# Patient Record
Sex: FEMALE | Race: WHITE | NOT HISPANIC OR LATINO | Employment: OTHER | URBAN - NONMETROPOLITAN AREA
[De-identification: names, ages, dates, MRNs, and addresses within clinical notes are randomized per-mention and may not be internally consistent; named-entity substitution may affect disease eponyms.]

---

## 2017-04-26 ENCOUNTER — OFFICE VISIT - GICH (OUTPATIENT)
Dept: FAMILY MEDICINE | Facility: OTHER | Age: 67
End: 2017-04-26

## 2017-04-26 ENCOUNTER — HISTORY (OUTPATIENT)
Dept: FAMILY MEDICINE | Facility: OTHER | Age: 67
End: 2017-04-26

## 2017-04-26 DIAGNOSIS — Z12.4 ENCOUNTER FOR SCREENING FOR MALIGNANT NEOPLASM OF CERVIX: ICD-10-CM

## 2017-04-27 ENCOUNTER — OFFICE VISIT - GICH (OUTPATIENT)
Dept: PEDIATRICS | Facility: OTHER | Age: 67
End: 2017-04-27

## 2017-04-27 ENCOUNTER — HISTORY (OUTPATIENT)
Dept: PEDIATRICS | Facility: OTHER | Age: 67
End: 2017-04-27

## 2017-04-27 DIAGNOSIS — F43.9 REACTION TO SEVERE STRESS: ICD-10-CM

## 2017-04-27 DIAGNOSIS — E04.2 NONTOXIC MULTINODULAR GOITER: ICD-10-CM

## 2017-04-27 DIAGNOSIS — Z13.220 ENCOUNTER FOR SCREENING FOR LIPOID DISORDERS: ICD-10-CM

## 2017-04-27 DIAGNOSIS — R19.6 HALITOSIS: ICD-10-CM

## 2017-04-27 DIAGNOSIS — Z12.11 ENCOUNTER FOR SCREENING FOR MALIGNANT NEOPLASM OF COLON: ICD-10-CM

## 2017-04-27 DIAGNOSIS — K58.9 IRRITABLE BOWEL SYNDROME WITHOUT DIARRHEA: ICD-10-CM

## 2017-04-27 DIAGNOSIS — L65.9 NONSCARRING HAIR LOSS: ICD-10-CM

## 2017-04-27 DIAGNOSIS — J30.2 OTHER SEASONAL ALLERGIC RHINITIS: ICD-10-CM

## 2017-04-27 LAB
ABSOLUTE BASOPHILS - HISTORICAL: 0 THOU/CU MM
ABSOLUTE EOSINOPHILS - HISTORICAL: 0.1 THOU/CU MM
ABSOLUTE LYMPHOCYTES - HISTORICAL: 2 THOU/CU MM (ref 0.9–2.9)
ABSOLUTE MONOCYTES - HISTORICAL: 0.3 THOU/CU MM
ABSOLUTE NEUTROPHILS - HISTORICAL: 3.8 THOU/CU MM (ref 1.7–7)
ANION GAP - HISTORICAL: 7 (ref 5–18)
BASOPHILS # BLD AUTO: 0.6 %
BUN SERPL-MCNC: 18 MG/DL (ref 7–25)
BUN/CREAT RATIO - HISTORICAL: 30
CALCIUM SERPL-MCNC: 9.4 MG/DL (ref 8.6–10.3)
CHLORIDE SERPLBLD-SCNC: 104 MMOL/L (ref 98–107)
CHOL/HDL RATIO - HISTORICAL: 3.42
CHOLESTEROL TOTAL: 212 MG/DL
CO2 SERPL-SCNC: 27 MMOL/L (ref 21–31)
CREAT SERPL-MCNC: 0.6 MG/DL (ref 0.7–1.3)
EOSINOPHIL NFR BLD AUTO: 1.3 %
ERYTHROCYTE [DISTWIDTH] IN BLOOD BY AUTOMATED COUNT: 12 % (ref 11.5–15.5)
GFR IF NOT AFRICAN AMERICAN - HISTORICAL: >60 ML/MIN/1.73M2
GLUCOSE SERPL-MCNC: 80 MG/DL (ref 70–105)
HCT VFR BLD AUTO: 41 % (ref 33–51)
HDLC SERPL-MCNC: 62 MG/DL (ref 23–92)
HEMOGLOBIN: 13.2 G/DL (ref 12–16)
LDLC SERPL CALC-MCNC: 134 MG/DL
LYMPHOCYTES NFR BLD AUTO: 32.1 % (ref 20–44)
MCH RBC QN AUTO: 30.9 PG (ref 26–34)
MCHC RBC AUTO-ENTMCNC: 32.2 G/DL (ref 32–36)
MCV RBC AUTO: 96 FL (ref 80–100)
MONOCYTES NFR BLD AUTO: 5.6 %
NEUTROPHILS NFR BLD AUTO: 60.4 % (ref 42–72)
NON-HDL CHOLESTEROL - HISTORICAL: 150 MG/DL
PATIENT STATUS - HISTORICAL: ABNORMAL
PLATELET # BLD AUTO: 237 THOU/CU MM (ref 140–440)
PMV BLD: 6.5 FL (ref 6.5–11)
POTASSIUM SERPL-SCNC: 3.9 MMOL/L (ref 3.5–5.1)
RED BLOOD COUNT - HISTORICAL: 4.27 MIL/CU MM (ref 4–5.2)
SODIUM SERPL-SCNC: 138 MMOL/L (ref 133–143)
TRIGL SERPL-MCNC: 81 MG/DL
TSH - HISTORICAL: 0.53 UIU/ML (ref 0.34–5.6)
WHITE BLOOD COUNT - HISTORICAL: 6.3 THOU/CU MM (ref 4.5–11)

## 2017-04-27 ASSESSMENT — ANXIETY QUESTIONNAIRES
GAD7 TOTAL SCORE: 0
3. WORRYING TOO MUCH ABOUT DIFFERENT THINGS: NOT AT ALL
5. BEING SO RESTLESS THAT IT IS HARD TO SIT STILL: NOT AT ALL
2. NOT BEING ABLE TO STOP OR CONTROL WORRYING: NOT AT ALL
6. BECOMING EASILY ANNOYED OR IRRITABLE: NOT AT ALL
4. TROUBLE RELAXING: NOT AT ALL
7. FEELING AFRAID AS IF SOMETHING AWFUL MIGHT HAPPEN: NOT AT ALL
1. FEELING NERVOUS, ANXIOUS, OR ON EDGE: NOT AT ALL

## 2017-04-28 ENCOUNTER — COMMUNICATION - GICH (OUTPATIENT)
Dept: SURGERY | Facility: OTHER | Age: 67
End: 2017-04-28

## 2017-04-28 DIAGNOSIS — Z12.11 ENCOUNTER FOR SCREENING FOR MALIGNANT NEOPLASM OF COLON: ICD-10-CM

## 2017-05-02 LAB — HPV RESULTS - HISTORICAL: NEGATIVE

## 2017-05-03 ENCOUNTER — AMBULATORY - GICH (OUTPATIENT)
Dept: SCHEDULING | Facility: OTHER | Age: 67
End: 2017-05-03

## 2017-05-07 ENCOUNTER — COMMUNICATION - GICH (OUTPATIENT)
Dept: FAMILY MEDICINE | Facility: OTHER | Age: 67
End: 2017-05-07

## 2017-05-15 ENCOUNTER — HISTORY (OUTPATIENT)
Dept: SURGERY | Facility: OTHER | Age: 67
End: 2017-05-15

## 2017-05-17 ENCOUNTER — SURGERY (OUTPATIENT)
Dept: SURGERY | Facility: OTHER | Age: 67
End: 2017-05-17

## 2017-05-17 ENCOUNTER — TRANSFERRED RECORDS (OUTPATIENT)
Dept: MULTI SPECIALTY CLINIC | Facility: CLINIC | Age: 67
End: 2017-05-17

## 2017-05-17 ENCOUNTER — HISTORY (OUTPATIENT)
Dept: SURGERY | Facility: OTHER | Age: 67
End: 2017-05-17

## 2017-05-17 ENCOUNTER — HOSPITAL ENCOUNTER (OUTPATIENT)
Dept: SURGERY | Facility: OTHER | Age: 67
Discharge: HOME | End: 2017-05-17
Attending: SURGERY | Admitting: SURGERY

## 2017-05-18 ENCOUNTER — HISTORY (OUTPATIENT)
Dept: SURGERY | Facility: OTHER | Age: 67
End: 2017-05-18

## 2017-05-18 ENCOUNTER — COMMUNICATION - GICH (OUTPATIENT)
Dept: SURGERY | Facility: OTHER | Age: 67
End: 2017-05-18

## 2017-07-18 ENCOUNTER — OFFICE VISIT - GICH (OUTPATIENT)
Dept: FAMILY MEDICINE | Facility: OTHER | Age: 67
End: 2017-07-18

## 2017-07-18 ENCOUNTER — HISTORY (OUTPATIENT)
Dept: FAMILY MEDICINE | Facility: OTHER | Age: 67
End: 2017-07-18
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
## 2017-08-15 ENCOUNTER — HOSPITAL ENCOUNTER (OUTPATIENT)
Dept: RADIOLOGY | Facility: OTHER | Age: 67
End: 2017-08-15
Attending: INTERNAL MEDICINE

## 2017-08-15 ENCOUNTER — HISTORY (OUTPATIENT)
Dept: RADIOLOGY | Facility: OTHER | Age: 67
End: 2017-08-15

## 2017-08-15 DIAGNOSIS — Z12.31 ENCOUNTER FOR SCREENING MAMMOGRAM FOR MALIGNANT NEOPLASM OF BREAST: ICD-10-CM

## 2017-12-05 ENCOUNTER — OFFICE VISIT - GICH (OUTPATIENT)
Dept: OTOLARYNGOLOGY | Facility: OTHER | Age: 67
End: 2017-12-05

## 2017-12-05 ENCOUNTER — AMBULATORY - GICH (OUTPATIENT)
Dept: SCHEDULING | Facility: OTHER | Age: 67
End: 2017-12-05

## 2017-12-05 DIAGNOSIS — Z00.00 ENCOUNTER FOR GENERAL ADULT MEDICAL EXAMINATION WITHOUT ABNORMAL FINDINGS: ICD-10-CM

## 2017-12-27 NOTE — PROGRESS NOTES
Patient Information     Patient Name MRN Sex Ria Hudson [id number] Female 1950      Progress Notes by Mary Carmen Alvarado at 8/15/2017 10:13 AM     Author:  Mary Carmen Alvarado Service:  (none) Author Type:  (none)     Filed:  8/15/2017 10:14 AM Date of Service:  8/15/2017 10:13 AM Status:  Signed     :  Mary Carmen Alvarado            Falls Risk Criteria:    Age 65 and older or under age 4        Sensory deficits    Poor vision    Use of ambulatory aides    Impaired judgment    Unable to walk independently    Meets High Risk criteria for falls:  Yes               1.  Do you have dizziness or vertigo?    no                    2.  Do you need help standing or walking?   no                 3.  Have you fallen within the last 6 months?    no           4.  Has the patient been fasting?      no       If any risks are marked Yes, the following interventions are utilized:    Do not leave patient unattended     Assist patient in the dressing room and bathroom    Have ambulatory aides available throughout procedure    Involve patient s family if available

## 2017-12-28 NOTE — PROGRESS NOTES
"Patient Information     Patient Name MRN Sex Ria Hudson [id number] Female 1950      Progress Notes by Kassandra Ulloa NP at 2017  2:15 PM     Author:  Kassandra Ulloa NP Service:  (none) Author Type:  PHYS- Nurse Practitioner     Filed:  2017  4:24 PM Encounter Date:  2017 Status:  Signed     :  Kassandra Ulloa NP (PHYS- Nurse Practitioner)            HPI:    Ria Warren is a 66 y.o. female who presents to clinic today for tick bite concerns. She reports she removed a deer tick on . This was removed in pieces. Unsure if she got the entire tick out, has a lump now.     Past Medical History:     Diagnosis  Date     Abdominal pain, lower 10/10/2006    mid to lower quadrant. Under investigation      Atypical chest pain      Cat bite     Hospitalized      Generalized anxiety disorder      Goiter      Low back pain 10/10/2006    Right. Under investigation      NVD (normal vaginal delivery)      2, Para 1with 1 spontaneous       Postmenopausal      Special screening for malignant neoplasms, colon      Past Surgical History:      Procedure  Laterality Date     PAST SURGICAL HISTORY       Hospitalized for cat bite~ Lymph node resection x 3 at left axilla for benign disease~Notes prior complications from anesthesia:       [facility] COLONOSCOPY REMOVE FARHAD POLYP LESN HOT BPSY FORCEP  2012            [facility] COLONOSCOPY REMOVE FARHAD POLYP LESN HOT BPSY FORCEP  2017    follow up  tubular adenoma       TONSILLECTOMY       Social History     Substance Use Topics       Smoking status: Never Smoker     Smokeless tobacco: Never Used     Alcohol use No     No current outpatient prescriptions on file.     No current facility-administered medications for this visit.      Medications have been reviewed by me and are current to the best of my knowledge and ability.    Allergies      Allergen   Reactions     Penicillins  *Unknown     Any \"cillins\" Skin burn " from inside out /told to not use        Zithromax [Azithromycin]  Vomiting       ROS:  Pertinent positives and negatives are noted in HPI.    EXAM:  General appearance: well appearing female, in no acute distress  Dermatological: small red lesion to back of scalp, no foreign bodies noted, no erythema or rash  Psychological: normal affect, alert and pleasant    ASSESSMENT/PLAN:    ICD-10-CM    1. Tick bite, initial encounter W57.XXXA doxycycline (VIBRAMYCIN) 100 mg capsule   Tick bite that meets criteria for prophylaxis. Rx for doxycycline x1. Discussed s/s that would warrant f/u. All questions were answered and she is in agreement with plan.     Patient Instructions      Index Hebrew Related topics   Tick Bite   ________________________________________________________________________  KEY POINTS    Ticks are small bugs that feed on the blood of animals, birds, and people. If you find a tick attached to your skin, you have been bitten. You usually will not feel anything when a tick bites you, but you may have a little redness around the bite.    You need to remove the tick yourself or get help from your healthcare provider. Save the tick in case you later start having symptoms of disease and need to know what kind of tick bit you. Check for a rash and other symptoms for about 4 weeks after the bite.    When you are outdoors, wear long-sleeved shirts tucked into your pants. Wear your pants tucked into your socks or boot tops if possible. Use approved tick repellents on exposed skin and clothing.  ________________________________________________________________________  What are ticks?  Ticks are small bugs that feed on the blood of animals, birds, and people. There are many different kinds of ticks. Black-legged ticks, or deer ticks, are usually no bigger than the head of a pin. Wood and dog ticks are usually much larger. They may be about a quarter of an inch before feeding and half an inch when they are full of  blood.  Ticks are found in woodlands, grasslands, and marshlands and at the seashore. Wild birds and animals, as well as domestic animals and pets such as dogs, horses, and cows, can carry ticks. Ticks may climb on humans from animals, leaves, or low-lying brush. They may fall from tree leaves, especially on a windy day. Ticks cannot jump or fly.  How do I know if I have been bitten by a tick?  If you find a tick attached to your skin, you have been bitten. You usually will not feel anything when a tick bites you, but you may have a little redness around the bite.  Can I get sick from a tick bite?  There is little risk from the bite of a tick most of the time. However, some ticks carry infections that can be passed to people. For example:    Deer tick bites may cause Lyme disease. The symptoms of Lyme disease include a red, round rash that starts at the site of the bite and gets bigger. Over time, a similar rash may appear in other parts of the body. In some cases, the rash looks like a bulls-eye or target on your skin. Some people have a rash without other symptoms. If you do have other symptoms, they may include feeling very tired, headache, muscle aches, joint pain or swelling, and a fever.    Wood tick or dog tick bites may cause [place] Mountain spotted fever (RMSF). RMSF may first cause fever, headache, muscle aches, joint pain or swelling, and then a pink or red spotted rash. You need to get treatment right away if you have these symptoms and have had a tick bite. RMSF is seen in most states, not just the [place] areas.  Tick bites may cause other diseases as well.  How are tick bites treated?  If you find a tick attached to your body, you need to remove it. You can remove it yourself or get help from your healthcare provider. To remove an attached tick:    Grasp the tick with tweezers or fingers (covered with gloves or a tissue) as close to the skin as possible.    Gently pull the tick straight away  from you until it releases its hold. Use a slow gentle pulling motion. Pulling the tick out too quickly may tear the body from the mouth, leaving the mouth still in the skin. If you are unable to remove the tick completely, you may need to see your healthcare provider.    Do not twist the tick as you pull, and try not to squeeze its body. Squeezing or crushing the tick could force infected fluids from the tick into the site of the bite.  After you have removed the tick, thoroughly wash your hands and the bite area with soap and water. Put an antiseptic such as rubbing alcohol on the area where you were bitten.  Infected ticks usually do not spread an infection until after the tick has been attached and feeding on your blood for several hours. Check for a rash and other symptoms for about 4 weeks after the bite.  Save the tick in case you later start having symptoms of disease and need to know what kind of tick bit you. Put the tick in a sealed plastic bag and keep it in the freezer. Identification of the tick may help your provider diagnose and treat your symptoms. If you do not have any symptoms of disease after 1 month, you can throw the tick away.  How can I help prevent tick bites?    In areas of thick underbrush, try to stay near the center of trails.    When you are outdoors, wear long-sleeved shirts tucked into your pants. Wear your pants tucked into your socks or boot tops if possible. A hat may help, too. Wearing light-colored clothing may make it easier to spot a small tick before it reaches your skin and bites. While you are outside, check for ticks every 4 hours and remove any ticks on clothing or exposed skin.    Use approved tick repellents on exposed skin and clothing. Don't use more repellent than recommended in the package directions. Don't put repellent on open wounds or rashes. When using sprays, don t spray the repellent directly on your face. Spray the repellent on your hands first and then put  it on your face, but not near your eyes or mouth. Then wash the spray off your hands.    Adults should use products with no more than 35% DEET. Children older than 2 months can use repellents with no more than 30% DEET. DEET should be applied just once a day. Wash it off your body when you go back indoors. Some products contain more than 35% DEET. The higher concentrations are no more effective than the lower concentrations, but they may last longer. Read the label carefully before applying.    Picaridin may irritate the skin less than DEET and appears to be just as effective.    Spray clothes with repellents because insects may crawl from clothing to the skin or bite through thin clothing. Products containing permethrin are recommended for use on clothing, shoes, bed nets, and camping gear. Permethrin-treated clothing repels and kills ticks, mosquitoes, and other insects and can keep working after laundering. Permethrin should be reapplied to clothing according to the instructions on the product label. You can buy clothing and hats pretreated with permethrin. Permethrin does not work as a repellent when it is put on the skin.    Treat household pets for ticks and fleas. Check pets after they have been outdoors.    Brush off clothing and pets before entering the house.    After you have been outdoors, undress and check your body for ticks. They usually crawl around for several hours before biting. Check your clothes, too. Wash them right away to remove any ticks.    Shower and shampoo after your outing.    Inspect any gear you have carried outdoors.    If you spend much time hiking, you may want to include a pair of tick tweezers in your first-aid kit. You can buy them at sporting goods stores.  Developed by [employer].  Adult Advisor 2016.3 published by [employer].  Last modified: 2016-04-04  Last reviewed: 2016-03-31  This content is reviewed periodically and is subject to change as new health information  becomes available. The information is intended to inform and educate and is not a replacement for medical evaluation, advice, diagnosis or treatment by a healthcare professional.  References   Adult Advisor 2016.3 Index    Copyright   2016 [employer], a division of [employer] All rights reserved.

## 2017-12-29 NOTE — PATIENT INSTRUCTIONS
Patient Information     Patient Name Ria Price [id number] Female 1950      Patient Instructions by Kassandra Ulloa NP at 2017  2:04 PM     Author:  Kassandra Ulloa NP  Service:  (none) Author Type:  PHYS- Nurse Practitioner     Filed:  2017  2:05 PM  Encounter Date:  2017 Status:  Addendum     :  Kassandra Ulloa NP (PHYS- Nurse Practitioner)        Related Notes: Original Note by Kassandra Ulloa NP (PHYS- Nurse Practitioner) filed at 2017  2:04 PM               Index [place] Islander Related topics   Tick Bite   ________________________________________________________________________  KEY POINTS    Ticks are small bugs that feed on the blood of animals, birds, and people. If you find a tick attached to your skin, you have been bitten. You usually will not feel anything when a tick bites you, but you may have a little redness around the bite.    You need to remove the tick yourself or get help from your healthcare provider. Save the tick in case you later start having symptoms of disease and need to know what kind of tick bit you. Check for a rash and other symptoms for about 4 weeks after the bite.    When you are outdoors, wear long-sleeved shirts tucked into your pants. Wear your pants tucked into your socks or boot tops if possible. Use approved tick repellents on exposed skin and clothing.  ________________________________________________________________________  What are ticks?  Ticks are small bugs that feed on the blood of animals, birds, and people. There are many different kinds of ticks. Black-legged ticks, or deer ticks, are usually no bigger than the head of a pin. Wood and dog ticks are usually much larger. They may be about a quarter of an inch before feeding and half an inch when they are full of blood.  Ticks are found in woodlands, grasslands, and marshlands and at the seashore. Wild birds and animals, as well as domestic animals and pets such as dogs,  horses, and cows, can carry ticks. Ticks may climb on humans from animals, leaves, or low-lying brush. They may fall from tree leaves, especially on a windy day. Ticks cannot jump or fly.  How do I know if I have been bitten by a tick?  If you find a tick attached to your skin, you have been bitten. You usually will not feel anything when a tick bites you, but you may have a little redness around the bite.  Can I get sick from a tick bite?  There is little risk from the bite of a tick most of the time. However, some ticks carry infections that can be passed to people. For example:    Deer tick bites may cause Lyme disease. The symptoms of Lyme disease include a red, round rash that starts at the site of the bite and gets bigger. Over time, a similar rash may appear in other parts of the body. In some cases, the rash looks like a bulls-eye or target on your skin. Some people have a rash without other symptoms. If you do have other symptoms, they may include feeling very tired, headache, muscle aches, joint pain or swelling, and a fever.    Wood tick or dog tick bites may cause Mohan Mountain spotted fever (RMSF). RMSF may first cause fever, headache, muscle aches, joint pain or swelling, and then a pink or red spotted rash. You need to get treatment right away if you have these symptoms and have had a tick bite. RMSF is seen in most states, not just the [place] areas.  Tick bites may cause other diseases as well.  How are tick bites treated?  If you find a tick attached to your body, you need to remove it. You can remove it yourself or get help from your healthcare provider. To remove an attached tick:    Grasp the tick with tweezers or fingers (covered with gloves or a tissue) as close to the skin as possible.    Gently pull the tick straight away from you until it releases its hold. Use a slow gentle pulling motion. Pulling the tick out too quickly may tear the body from the mouth, leaving the mouth still in  the skin. If you are unable to remove the tick completely, you may need to see your healthcare provider.    Do not twist the tick as you pull, and try not to squeeze its body. Squeezing or crushing the tick could force infected fluids from the tick into the site of the bite.  After you have removed the tick, thoroughly wash your hands and the bite area with soap and water. Put an antiseptic such as rubbing alcohol on the area where you were bitten.  Infected ticks usually do not spread an infection until after the tick has been attached and feeding on your blood for several hours. Check for a rash and other symptoms for about 4 weeks after the bite.  Save the tick in case you later start having symptoms of disease and need to know what kind of tick bit you. Put the tick in a sealed plastic bag and keep it in the freezer. Identification of the tick may help your provider diagnose and treat your symptoms. If you do not have any symptoms of disease after 1 month, you can throw the tick away.  How can I help prevent tick bites?    In areas of thick underbrush, try to stay near the center of trails.    When you are outdoors, wear long-sleeved shirts tucked into your pants. Wear your pants tucked into your socks or boot tops if possible. A hat may help, too. Wearing light-colored clothing may make it easier to spot a small tick before it reaches your skin and bites. While you are outside, check for ticks every 4 hours and remove any ticks on clothing or exposed skin.    Use approved tick repellents on exposed skin and clothing. Don't use more repellent than recommended in the package directions. Don't put repellent on open wounds or rashes. When using sprays, don t spray the repellent directly on your face. Spray the repellent on your hands first and then put it on your face, but not near your eyes or mouth. Then wash the spray off your hands.    Adults should use products with no more than 35% DEET. Children older than 2  months can use repellents with no more than 30% DEET. DEET should be applied just once a day. Wash it off your body when you go back indoors. Some products contain more than 35% DEET. The higher concentrations are no more effective than the lower concentrations, but they may last longer. Read the label carefully before applying.    Picaridin may irritate the skin less than DEET and appears to be just as effective.    Spray clothes with repellents because insects may crawl from clothing to the skin or bite through thin clothing. Products containing permethrin are recommended for use on clothing, shoes, bed nets, and camping gear. Permethrin-treated clothing repels and kills ticks, mosquitoes, and other insects and can keep working after laundering. Permethrin should be reapplied to clothing according to the instructions on the product label. You can buy clothing and hats pretreated with permethrin. Permethrin does not work as a repellent when it is put on the skin.    Treat household pets for ticks and fleas. Check pets after they have been outdoors.    Brush off clothing and pets before entering the house.    After you have been outdoors, undress and check your body for ticks. They usually crawl around for several hours before biting. Check your clothes, too. Wash them right away to remove any ticks.    Shower and shampoo after your outing.    Inspect any gear you have carried outdoors.    If you spend much time hiking, you may want to include a pair of tick tweezers in your first-aid kit. You can buy them at sporting goods stores.  Developed by [employer].  Adult Advisor 2016.3 published by [employer].  Last modified: 2016-04-04  Last reviewed: 2016-03-31  This content is reviewed periodically and is subject to change as new health information becomes available. The information is intended to inform and educate and is not a replacement for medical evaluation, advice, diagnosis or treatment by a healthcare  professional.  References   Adult Advisor 2016.3 Index    Copyright   2016 [employer], a division of [employer] All rights reserved.

## 2017-12-30 NOTE — NURSING NOTE
Patient Information     Patient Name MRN Sex Ria Hudson [id number] Female 1950      Nursing Note by Chelsi Romero at 2017  2:15 PM     Author:  Chelsi Romero Service:  (none) Author Type:  NURS- Student Practical Nurse     Filed:  2017  2:01 PM Encounter Date:  2017 Status:  Signed     :  Chelsi Romero (NURS- Student Practical Nurse)            Patient presents to the clinic with tick bite. Located to back of head. Pulled it out Nev[place] morning and now feels a lump, thinks some of the tick may be still in there.   Chelsi Romero ....................  2017   1:44 PM

## 2018-01-04 NOTE — PROGRESS NOTES
"Patient Information     Patient Name MRN Ria Art [id number] Female 1950      Progress Notes by Eduardo Nova MD at 2017 10:00 AM     Author:  Eduardo Nova MD Service:  (none) Author Type:  Physician     Filed:  2017 12:56 PM Encounter Date:  2017 Status:  Signed     :  Eduardo Nova MD (Physician)            Subjective  Ria Warren is a 66 y.o. female who presents for Evaluation of halitosis. She just noticed last few weeks. It's a horrible taste in her mouth. It's worse first manish in the morning.\"It tastes like something is dead.\" No nausea. No abdominal pain. No heartburn. She just came from the dentist with a good report. She's been using mouthwash, flossing and brushing her teeth and tongue. She's had a little sore throat which she describes as a roughness. She has lots of postnasal drainage. She feels like her sinuses are congested worse left greater than right. No sneezing. No rhinorrhea. Slight popping of the ears. No itching of the ears or eyes however her eyes feel irritated, gritty and look red.    Problem List/PMH: reviewed in EMR, and made relevant updates today.  Medications: reviewed in EMR, and made relevant updates today.  Allergies: reviewed in EMR, and made relevant updates today.    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      Granddaughter with Down's and congenital heart disease.    ,  [place]. Wants to renovate the family barn near [place]. Animal lover.      I reviewed social history and made relevant updates today.    Family Hx:   Family History       Problem   Relation Age of Onset     Cancer-breast  Mother      50's       Cancer-colon  Maternal Grandfather      Colon       Diabetes  Maternal Grandfather      Other  Other      Maternal side " heart disease       Blood Disease  Neg.      no blood clotting disorders       Thyroid Disease  Other      multiple women with goiters       Other  Neg.      no SLE, no RA, no sjogren's       Heart failure  Maternal Grandmother      Cancer-ovarian  No Family History        Objective  Vitals: reviewed in EMR.  /82  Pulse 64  Wt 69.2 kg (152 lb 8 oz)  BMI 29.78 kg/m2    Gen: Pleasant female, NAD.  HEENT: MMM, no OP erythema. Tympanic membranes are normal, small amount of clear fluid bilaterally. Nasal turbinates are pale, boggy.  Neck: Supple, no adenopathy  CV: RRR no m/r/g.   Pulm: CTAB no w/r/r  Neuro: Grossly intact  Msk: No lower extremity edema.  Skin: No concerning lesions.  Psychiatric: Normal affect and insight. Does not appear anxious or depressed.    Component      Latest Ref Rng & Units 3/4/2015   SODIUM      136 - 145 mmol/L 140   POTASSIUM      3.5 - 5.1 mmol/L 4.2   CHLORIDE      98 - 107 mmol/L 104   CO2,TOTAL      21 - 31 mmol/L 34 (H)   ANION GAP      5 - 18                 2 (L)   GLUCOSE      70 - 105 mg/dL 89   CALCIUM      8.6 - 10.3 mg/dL 9.3   BUN      7 - 25 mg/dL 17   CREATININE      0.70 - 1.30 mg/dL 0.58 (L)   BUN/CREAT RATIO                10 - 20                 29 (H)   GFR if African American      >60 ml/min/1.73m2 >60   GFR if not African American      >60 ml/min/1.73m2 >60   CHOLESTEROL,TOTAL      <200 mg/dL 200 (H)   TRIGLYCERIDES      <150 mg/dL 47   HDL CHOLESTEROL      23 - 92 mg/dL 60   NON-HDL CHOLESTEROL      <145 mg/dl 140   CHOL/HDL RATIO                 <4.50                 3.33   LDL CHOLESTEROL      <100 mg/dL 131 (H)   PATIENT STATUS                  NOT GIVEN   TSH      0.34 - 5.60 uIU/mL 0.46       Assessment    ICD-10-CM    1. Halitosis R19.6    2. Colon cancer screening Z12.11 COLONOSCOPY SCREENING-GICH   3. Multiple thyroid nodules E04.2 TSH      TSH   4. Seasonal allergic rhinitis, unspecified allergic rhinitis trigger J30.2 fluticasone (50 mcg per  actuation) nasal solution (FLONASE)   5. Irritable bowel syndrome, unspecified type K58.9    6. Hair loss L65.9 TSH      BASIC METABOLIC PANEL      CBC WITH DIFFERENTIAL      TSH      BASIC METABOLIC PANEL      CBC WITH DIFFERENTIAL      CBC WITH AUTO DIFFERENTIAL   7. Stress at home F43.9    8. Lipid screening Z13.220 LIPID PANEL      LIPID PANEL         Plan   -- Expected clinical course discussed   -- Medications and their side effects discussed  Patient Instructions    -- Shower/bathe before bed   -- Do sinus rinsing at least daily, but okay to use multiple times per day. (with distilled water)    Nasal saline spray    NeilMed sinus rinse    Neti pot   -- Start nasal steroid spray daily (eg Nasacort, Flonase)   -- When using steroid spray: tilt head forward, spray away from center septum, don't sniff deeply during inhalation.   -- Steroid spray works best when used consistently, not as needed.   -- Okay to start daily Claritin/Allegra/Zyrtec (without -D), can help for sneezing, itchy eyes, etc.   -- Okay to use diphenhydramine (Benadryl) as needed for sneezing, nasal congestion, can cause dry mouth, urinary retention, blurry vision, constipation     -- Try oral losenges with Xylitol   -- Consider restarting acid medicine, eg ranitidine 150 mg twice daily for heart burn   -- Follow-up if symptoms persist/worsen   -- Would refer to ENT if not improving           Index   Bad Breath   ________________________________________________________________________  KEY POINTS    Bad breath, also called halitosis, is an unpleasant smell from your mouth or nose. You may not always know when you have bad breath, but others may notice it.    See your dentist and dental hygienist as often as recommended for checkups and cleanings. They can check for gum disease or other dental problems.    If you need to use something constantly to freshen your breath, you should see your dentist or healthcare provider to help find the  cause.  ________________________________________________________________________  What is bad breath?  Bad breath, also called halitosis, is an unpleasant smell from your mouth or nose. You may not always know when you have bad breath, but others may notice it.   A quick way to check your breath is to lick the side of your finger and then let the saliva dry for a minute or so. Smell the spot and you'll know what your breath smells like. If you use dental floss, try smelling the floss when you are finished to see if you have bad breath.  What is the cause?   Bad breath is a symptom, not a disease. It may be caused by many things, such as:    Eating foods such as garlic or onions    Not brushing and flossing your teeth daily. Food stuck in your teeth and gums after eating is broken down by bacteria in your mouth and this can cause a bad smell.    Tooth decay and gum disease caused by plaque. Plaque is a sticky material that builds up on your teeth. It is made of mucus and saliva, food particles, acids, and bacteria. If it s not removed with daily brushing and flossing, plaque can lead to cavities, a hard buildup called tartar, and gum disease.    Smoking and use of other tobacco products    Dry mouth from medicines you are taking, salivary gland problems, or always breathing through your mouth. With less saliva, your body is not able to completely cleanse your mouth. Bad breath in the morning happens because your body uses up water at night and your mouth dries out.    Infection in your teeth, gums, sinuses, tonsils, lungs, or digestive system    Other medical problems, such as diabetes, liver or kidney disease, heartburn, or gastric reflux  How is it diagnosed?   Your dentist will examine your mouth, looking for tooth decay, pockets of plaque, and gum disease. If your dentist finds that your mouth is healthy, you may be referred to your healthcare provider to check for medical problems that can cause bad breath.  How  can I prevent bad breath?   Here are some things you can do to help prevent bad breath:    Clean your teeth well. Use a soft-bristled toothbrush and toothpaste that contains fluoride. Brush for at least 2 minutes, at least twice a day. Floss once a day. Cut a piece of floss that is long enough for you to be able to use a fresh section of floss each time you bring the floss down between two teeth. Don t forget to floss behind your back molars, where a lot of plaque can collect.    Brush your tongue, especially the back, to remove odor-causing bacteria.    When you cannot brush after eating, chew sugarless gum. It helps your body make saliva and helps remove plaque. Gum made with a sweetener called Xylitol can help limit the growth of bacteria.    Drink more water. If you have a dry mouth that does not go away with other treatments, such as drinking more water or chewing gum, your dentist may recommend a saliva replacement product.    Use a fluoride or alcohol-free antibacterial mouth rinse to help prevent tooth decay.    Avoid smoking, coffee, alcohol, onions, and garlic.    You can try drinking tea. Lab studies have shown that black or green tea keeps bacteria in the mouth from making the chemicals that smell bad.    If you wear dentures, take them out at night to clean them thoroughly. When possible, leave them out to soak while you sleep. Soak them in a denture cleaning solution and then brush them thoroughly to remove molds, fungus, and bacteria. Don't forget to brush all the areas in your mouth that are touched by the dentures.    See your dentist and dental hygienist as often as recommended for checkups and cleanings. They can check for gum disease or other dental problems.  You can use a mouthwash or other breath freshener to hide bad breath. But if you need to use something constantly to freshen your breath, you should see your dentist or healthcare provider to help find the cause.  You can get more  information from:    American Dental Association  193.890.8023   http://www.mouthhealthy.org/en/  Developed by [employer].  Adult Advisor 2016.3 published by [employer].  Last modified: 2016-05-25  Last reviewed: 2014-02-06  This content is reviewed periodically and is subject to change as new health information becomes available. The information is intended to inform and educate and is not a replacement for medical evaluation, advice, diagnosis or treatment by a healthcare professional.  References   Adult Advisor 2016.3 Index    Copyright   2016 [employer], a division of [employer] All rights reserved.         Return if symptoms worsen or fail to improve.    Signed, Eduardo Nova MD  Internal Medicine & Pediatrics

## 2018-01-04 NOTE — TELEPHONE ENCOUNTER
Patient Information     Patient Name MRN Ria Art [id number] Female 1950      Telephone Encounter by Denise Olivo at 2017  9:34 AM     Author:  Denise Olivo Service:  (none) Author Type:  (none)     Filed:  2017  9:43 AM Encounter Date:  2017 Status:  Signed     :  Denise Olivo            Screening Questions for the Scheduling of Screening Colonoscopies   (If Colonoscopy is diagnostic, Provider should review the chart before scheduling.)  Are you younger than 50 or older than 80?  NO  Do you take aspirin or fish oil?  NO (if yes, tell patient to stop 1 week prior to Colonoscopy)  Do you take warfarin (Coumadin), clopidogrel (Plavix), apixaban (Eliquis), dabigatram (Pradaxa), rivaroxaban (Xarelto) or any blood thinner? NO  Do you use oxygen at home?  NO  Do you have kidney disease? NO  Are you on dialysis? NO  Have you had a stroke or heart attack in the last year? NO  Have you had a stent in your heart or any blood vessel in the last year? NO  Have you had a transplant of any organ? NO  Have you had a colonoscopy or upper endoscopy (EGD) before? YES          When?  2013  - [facility]    Date of scheduled Colonoscopy 2017   Provider MCCLENDON   Mohinder PORRAS

## 2018-01-04 NOTE — PATIENT INSTRUCTIONS
Patient Information     Patient Name MRN Ria Art [id number] Female 1950      Patient Instructions by Eduardo Nova MD at 2017 10:00 AM     Author:  Eduardo Nova MD  Service:  (none) Author Type:  Physician     Filed:  2017 10:37 AM  Encounter Date:  2017 Status:  Addendum     :  Eduardo Nova MD (Physician)        Related Notes: Original Note by Eduardo Nova MD (Physician) filed at 2017 10:33 AM             -- Shower/bathe before bed   -- Do sinus rinsing at least daily, but okay to use multiple times per day. (with distilled water)    Nasal saline spray    NeilMed sinus rinse    Neti pot   -- Start nasal steroid spray daily (eg Nasacort, Flonase)   -- When using steroid spray: tilt head forward, spray away from center septum, don't sniff deeply during inhalation.   -- Steroid spray works best when used consistently, not as needed.   -- Okay to start daily Claritin/Allegra/Zyrtec (without -D), can help for sneezing, itchy eyes, etc.   -- Okay to use diphenhydramine (Benadryl) as needed for sneezing, nasal congestion, can cause dry mouth, urinary retention, blurry vision, constipation     -- Try oral losenges with Xylitol   -- Consider restarting acid medicine, eg ranitidine 150 mg twice daily for heart burn   -- Follow-up if symptoms persist/worsen   -- Would refer to ENT if not improving           Index   Bad Breath   ________________________________________________________________________  KEY POINTS    Bad breath, also called halitosis, is an unpleasant smell from your mouth or nose. You may not always know when you have bad breath, but others may notice it.    See your dentist and dental hygienist as often as recommended for checkups and cleanings. They can check for gum disease or other dental problems.    If you need to use something constantly to freshen your breath, you should see your dentist or healthcare provider to help find the  cause.  ________________________________________________________________________  What is bad breath?  Bad breath, also called halitosis, is an unpleasant smell from your mouth or nose. You may not always know when you have bad breath, but others may notice it.   A quick way to check your breath is to lick the side of your finger and then let the saliva dry for a minute or so. Smell the spot and you'll know what your breath smells like. If you use dental floss, try smelling the floss when you are finished to see if you have bad breath.  What is the cause?   Bad breath is a symptom, not a disease. It may be caused by many things, such as:    Eating foods such as garlic or onions    Not brushing and flossing your teeth daily. Food stuck in your teeth and gums after eating is broken down by bacteria in your mouth and this can cause a bad smell.    Tooth decay and gum disease caused by plaque. Plaque is a sticky material that builds up on your teeth. It is made of mucus and saliva, food particles, acids, and bacteria. If it s not removed with daily brushing and flossing, plaque can lead to cavities, a hard buildup called tartar, and gum disease.    Smoking and use of other tobacco products    Dry mouth from medicines you are taking, salivary gland problems, or always breathing through your mouth. With less saliva, your body is not able to completely cleanse your mouth. Bad breath in the morning happens because your body uses up water at night and your mouth dries out.    Infection in your teeth, gums, sinuses, tonsils, lungs, or digestive system    Other medical problems, such as diabetes, liver or kidney disease, heartburn, or gastric reflux  How is it diagnosed?   Your dentist will examine your mouth, looking for tooth decay, pockets of plaque, and gum disease. If your dentist finds that your mouth is healthy, you may be referred to your healthcare provider to check for medical problems that can cause bad breath.  How  can I prevent bad breath?   Here are some things you can do to help prevent bad breath:    Clean your teeth well. Use a soft-bristled toothbrush and toothpaste that contains fluoride. Brush for at least 2 minutes, at least twice a day. Floss once a day. Cut a piece of floss that is long enough for you to be able to use a fresh section of floss each time you bring the floss down between two teeth. Don t forget to floss behind your back molars, where a lot of plaque can collect.    Brush your tongue, especially the back, to remove odor-causing bacteria.    When you cannot brush after eating, chew sugarless gum. It helps your body make saliva and helps remove plaque. Gum made with a sweetener called Xylitol can help limit the growth of bacteria.    Drink more water. If you have a dry mouth that does not go away with other treatments, such as drinking more water or chewing gum, your dentist may recommend a saliva replacement product.    Use a fluoride or alcohol-free antibacterial mouth rinse to help prevent tooth decay.    Avoid smoking, coffee, alcohol, onions, and garlic.    You can try drinking tea. Lab studies have shown that black or green tea keeps bacteria in the mouth from making the chemicals that smell bad.    If you wear dentures, take them out at night to clean them thoroughly. When possible, leave them out to soak while you sleep. Soak them in a denture cleaning solution and then brush them thoroughly to remove molds, fungus, and bacteria. Don't forget to brush all the areas in your mouth that are touched by the dentures.    See your dentist and dental hygienist as often as recommended for checkups and cleanings. They can check for gum disease or other dental problems.  You can use a mouthwash or other breath freshener to hide bad breath. But if you need to use something constantly to freshen your breath, you should see your dentist or healthcare provider to help find the cause.  You can get more  information from:    American Dental Association  847.601.5773   http://www.mouthhealthy.org/en/  Developed by [employer].  Adult Advisor 2016.3 published by [employer].  Last modified: 2016-05-25  Last reviewed: 2014-02-06  This content is reviewed periodically and is subject to change as new health information becomes available. The information is intended to inform and educate and is not a replacement for medical evaluation, advice, diagnosis or treatment by a healthcare professional.  References   Adult Advisor 2016.3 Index    Copyright   2016 [employer], a division of [employer] All rights reserved.

## 2018-01-04 NOTE — NURSING NOTE
Patient Information     Patient Name MRN Ria Art [id number] Female 1950      Nursing Note by Rebeca Case at 2017  9:15 AM     Author:  Rebeca Case Service:  (none) Author Type:  (none)     Filed:  2017 10:19 AM Encounter Date:  2017 Status:  Signed     :  Rebeca Case            Pap only   Rebeca Case LPN........................2017  9:57 AM

## 2018-01-04 NOTE — NURSING NOTE
Patient Information     Patient Name MRN Ria Art [id number] Female 1950      Nursing Note by Alanna Myers at 2017 10:00 AM     Author:  Alanna Myers Service:  (none) Author Type:  (none)     Filed:  2017 10:14 AM Encounter Date:  2017 Status:  Signed     :  Alanna Myers            Patient presents to clinic for annual PX today.  Also has concerns of bad breath that started this winter, was just at the dentist and everything was fine.  States she has a sore throat at times but also has a lot of drainage.  Alanna Myers LPN ....................  2017   10:06 AM

## 2018-01-04 NOTE — PATIENT INSTRUCTIONS
Patient Information     Patient Name MRN Ria Art [id number] Female 1950      Patient Instructions by Nu Murcia NP at 2017  9:15 AM     Author:  Nu Murcia NP  Service:  (none) Author Type:  PHYS- Nurse Practitioner     Filed:  2017 10:30 AM  Encounter Date:  2017 Status:  Addendum     :  Nu Murcia NP (PHYS- Nurse Practitioner)        Related Notes: Original Note by Nu Murcia NP (PHYS- Nurse Practitioner) filed at 2017 10:30 AM               Index Kiswahili Related topics   Pap Test   ________________________________________________________________________  KEY POINTS    A Pap test is a screening test done during a pelvic exam to check for abnormal changes in the thin layer of cells that cover the cervix or vagina.    Schedule your Pap test between menstrual periods. Don t douche, use creams or medicines in your vagina, or have sex for a day or two before the test.    If the test result is normal, you don t need follow-up tests or treatment. If the test result is abnormal, ask your healthcare provider what types of abnormal changes were found and what follow-up tests you might need.  ________________________________________________________________________  What is a Pap test?  A Pap test is a screening test done during a pelvic exam. It checks for abnormal changes in the thin layer of cells that cover the cervix or vagina. The cervix is the lower part of the uterus that opens into the vagina. The vagina is the birth canal.  The Pap test is also called a Pap smear or cervical smear.  Why is this test done?  This test is done to check for cervical cancer or precancerous changes in the cells called cervical intraepithelial neoplasia (ASHA). Cervical cancer can be prevented if abnormal cells are found and treated before they become cancerous. Regular screening with Pap tests has reduced deaths from cervical cancer.  The Pap test may also detect  vaginal infections such as yeast infections, bacterial vaginosis, or trichomonas.  A human papillomavirus (HPV) test may be done at the same time as the Pap smear, using the same cells or different cells taken from your cervix. HPV infection can cause cervical cancer.  How often should I have a Pap test?  You should have your first Pap test at age 21, even if you are not sexually active. Then you should have a Pap test at least every 3 years until you are 65. If you are 30 or older, your healthcare provider may suggest combining a Pap test with an HPV test every 5 years instead of a Pap test every 3 years. You may need more frequent Pap tests if there are things that put you at a higher risk for cervical cancer or if you have had abnormal Pap tests. If you are over 65 years old, ask your healthcare provider if you can stop having Pap tests.   If you have had a hysterectomy to remove all of the uterus, including the cervix, for reasons other than cancer, you may not need to have Pap tests. If you had a hysterectomy because of cancer or abnormal cells, or if your cervix was not removed, you will need to keep having Pap tests as recommended by your healthcare provider.   You and your healthcare provider can decide what testing schedule is right for you. Your healthcare provider may also recommend a pelvic exam every 1 to 3 years. A pelvic exam is a checkup of your female organs: the cervix, uterus, vagina, ovaries, and fallopian tubes.  How do I prepare for this test?     Don t schedule your Pap test during your menstrual period. The best time to schedule the test at a time when you are between periods.    Don t douche for at least 2 days before the test.    Don t use any creams or medicine in your vagina for at least 2 days before the test unless your healthcare provider tells you to.    Don t have sex for 1 or 2 days before the Pap test because it can affect the results.  How is the test done?  A Pap test takes only  a few seconds and is done during a pelvic exam. You will lie on your back on the exam table with your knees bent and the heels of your feet in stirrup heel holders. Your healthcare provider will put a small tool called a speculum into your vagina to hold the vaginal walls open during the exam. Your provider will use a small, soft brush to take a few cells from the cervix. The cells will be sent to a lab for testing.  The Pap test is not painful, but you may feel some discomfort when the speculum is put into your vagina.  What does the test result mean?  If the test result is normal, you don t need follow-up tests or treatment.  If the test result is abnormal, ask your healthcare provider what types of abnormal changes were found and what follow-up tests you might need.  Test results are only one part of a larger picture that takes into account your medical history and current health. Sometimes a test needs to be repeated to check the first result. Talk to your healthcare provider about your result and ask questions, such as:    If you need more tests    What kind of treatment you might need    What lifestyle, diet, or other changes you might need to make  Developed by [employer].  Adult Advisor 2016.3 published by [employer].  Last modified: 2015-10-28  Last reviewed: 2015-10-20  This content is reviewed periodically and is subject to change as new health information becomes available. The information is intended to inform and educate and is not a replacement for medical evaluation, advice, diagnosis or treatment by a healthcare professional.  References   Adult Advisor 2016.3 Index    Copyright   2016 [employer], a division of [employer] All rights reserved.

## 2018-01-04 NOTE — ADDENDUM NOTE
Patient Information     Patient Name MRN Ria Art [id number] Female 1950      Addendum Note by Hedy Mendenhall at 2017 12:32 PM     Author:  Hedy Mendenhall Service:  (none) Author Type:  (none)     Filed:  2017 12:32 PM Encounter Date:  2017 Status:  Signed     :  Hedy Mendenhall       Addended by: HEDY MENDENHALL on: 2017 12:32 PM        Modules accepted: Orders

## 2018-01-04 NOTE — PROGRESS NOTES
"Patient Information     Patient Name MRN Sex Ria Hudson [id number] Female 1950      Progress Notes by Nu Murcia NP at 2017  9:15 AM     Author:  Nu Murcia NP Service:  (none) Author Type:  PHYS- Nurse Practitioner     Filed:  2017  4:16 PM Encounter Date:  2017 Status:  Signed     :  Nu Murcia NP (PHYS- Nurse Practitioner)            SUBJECTIVE:    Ria Warren is a 66 y.o. female who presents for Pap screening, reports has not had a Pap in many years. Reports his been  with same partner for many years, may have had \"warts\" many years ago, uncertain if any treatment. Reports a past history of STD from her  after he returned from a fishing trip, was treated. Denies any family history of genital or ovarian cancers.  Reports overall health is been good and has no concerns. Mammography 2016 negative.    Has some concerns about her thyroid and some chronic bowel issues, reports has appointment with her primary provider scheduled tomorrow to review chronic issues.      HPI    Allergies      Allergen   Reactions     Penicillins  *Unknown     Skin burn from inside out /told to not use        Zithromax [Azithromycin]  Vomiting   ,   Family History       Problem   Relation Age of Onset     Cancer-breast  Mother      50's       Cancer-colon  Maternal Grandfather      Colon       Diabetes  Maternal Grandfather      Other  Other      Maternal side heart disease       Blood Disease  Neg.      no blood clotting disorders       Thyroid Disease  Other      multiple women with goiters       Other  Neg.      no SLE, no RA, no sjogren's       Heart failure  Maternal Grandmother      Cancer-ovarian  No Family History    ,   No current outpatient prescriptions on file prior to visit.     No current facility-administered medications on file prior to visit.    , No current outpatient prescriptions on file.  Medications have been reviewed by me and are " current to the best of my knowledge and ability.,   Past Medical History:     Diagnosis  Date     Abdominal pain, lower 10/10/2006    mid to lower quadrant. Under investigation      Atypical chest pain      Cat bite 2004    Hospitalized      Generalized anxiety disorder      Goiter      Low back pain 10/10/2006    Right. Under investigation      NVD (normal vaginal delivery)      2, Para 1with 1 spontaneous       Postmenopausal      Special screening for malignant neoplasms, colon    ,   Patient Active Problem List       Diagnosis  Date Noted     Multiple thyroid nodules  2015     Seen by Endocrine 4/15, recommend annual exam and ultrasound        Atypical chest pain  2015     KIYA (generalized anxiety disorder)  2015     Special screening for malignant neoplasms, colon  2012     SKIN LESION/REMOVAL BY SHAVE  2012     DYSURIA  2012     VAGINITIS, ATROPHIC, POSTMENOPAUSAL  2012     GOITER       was on Synthroid at one time, now stable without medications        ,   Past Surgical History:      Procedure  Laterality Date     PAST SURGICAL HISTORY       Hospitalized for cat bite~ Lymph node resection x 3 at left axilla for benign disease~Notes prior complications from anesthesia:       [place] COLONOSCOPY REMOVE FARHAD POLYP LESN HOT BPSY FORCEP  2012            [place] COLONOSCOPY REMOVE FARHAD POLYP LESN SNARE  2012           and   Social History     Substance Use Topics       Smoking status: Never Smoker     Smokeless tobacco: Never Used     Alcohol use No       REVIEW OF SYSTEMS:  Review of Systems   Constitutional: Negative.    HENT: Negative.    Eyes: Negative.    Respiratory: Negative.    Cardiovascular: Negative.    Gastrointestinal: Negative.    Genitourinary: Negative.    Musculoskeletal: Negative.    Skin: Negative.    Neurological: Negative.    Endo/Heme/Allergies: Negative.    Psychiatric/Behavioral: Negative.        OBJECTIVE:  /80  Pulse 60   Ht 1.524 m (5')  Wt 68.9 kg (152 lb)  Breastfeeding? No  BMI 29.69 kg/m2    EXAM:   Physical Exam   Constitutional: She is oriented to person, place, and time and well-developed, well-nourished, and in no distress.   Cardiovascular: Normal rate.    Pulmonary/Chest: Effort normal.   Genitourinary: No vaginal discharge found.   Genitourinary Comments: Pelvic exam negative for any visible lesions, erythema, discharge. No cervical motion tenderness, erythema or purulence  Pap obtained   Musculoskeletal: Normal range of motion.   Neurological: She is alert and oriented to person, place, and time. Gait normal.   Skin: Skin is warm and dry.   Psychiatric: Mood, memory, affect and judgment normal.   Nursing note and vitals reviewed.      ASSESSMENT/PLAN:    ICD-10-CM    1. Cervical cancer screening Z12.4 GYN THIN PREP PAP SCREEN IMAGED      GYN THIN PREP PAP SCREEN IMAGED    lab pending--- patient had asked about ovarian cancer screening labs--- will discuss with her primary provider tomorrow  there is no family history of ovarian cancer    Plan:  We'll notify of lab results by letter--phone call if abnormality  follow-up pending lab results    Follow-up with primary as scheduled tomorrow for physical and multiple issues

## 2018-01-05 NOTE — OR ANESTHESIA
Patient Information     Patient Name MRN Sex Ria Patel [id number] Female 1950      OR Anesthesia by Kumar Cornejo CRNA at 2017 10:07 AM     Author:  Kumar Cornejo CRNA Service:  (none) Author Type:  NURS- Nurse Anesthetist     Filed:  2017 10:07 AM Date of Service:  2017 10:07 AM Status:  Signed     :  Kumar Cornejo CRNA (NURS- Nurse Anesthetist)            Anesthesia Post Operative Care Note    Name: Ria Warren  MRN:   [id number]  :    1950       Procedure Done:  See Surgeon Note   Case Cancelled for Anesthetic Reason:  No      Anesthesia Technique    Anesthetic Type:  MAC       MAC Type:  NC     Oral Trauma:  No    Intraoperative Course   Hemodynamics:  Stable    Ventilation Normal:  Yes Lung Sounds:  Normal      PACU Course        Nondepolarizer Used:       Reversed: N/A   Hemodynamics:  Stable      Hydration: Euvolemic   Temperature:  36.1 - 38.3      Mental Status:  Awake, alert, follows commands   Pain Management:  Adequate   Regional Block:  No   Anesthesia Complications:  None      Vital Signs:  Temp: 97  F (36.1  C)  Pulse: 79  BP: 122/77  Resp: 16  SpO2: 97 %    O2 Device: Nasal Cannula         Level of Nausea: None        Active Lines:  Patient Lines/Drains/Airways Status    Active Line     Name: Placement date: Placement time: Site: Days:    PERIPHERAL VAD Right Hand 22 17   0900   Hand   less than 1                Intake & Output:       Labs:  No results for input(s): BO4QZSLKNTI, PKW7UDNIOPFU, PHARTERIAL, APL7CTUVHZLY, P8JCYJKZUCPW in the last 24 hours.    No results for input(s): MAGNESIUM in the last 24 hours.    No results for input(s): GLUCOSEMETER in the last 720 hours.        Kumar Cornejo CRNA ....................  2017   10:07 AM

## 2018-01-05 NOTE — H&P
"Patient Information     Patient Name MRRia Cloud [id number] Female 1950      H&P by Jada Bullard MD at 2017  8:54 AM     Author:  Jada Bullard MD Service:  (none) Author Type:  Physician     Filed:  2017  8:56 AM Date of Service:  2017  8:54 AM Status:  Signed     :  Jada Bullard MD (Physician)            PRE-PROCEDURE NOTE    CHIEF COMPLAINT / REASON FOR PROCEDURE:  Need for screening colonoscopy.    PERTINENT HISTORY   Patient with no complaints. Previous colonoscopy -polyp removed. No diarrhea, constipation, abdominal pain or rectal bleeding. No family history of colon polyps or colon cancer.    Past Medical History:     Diagnosis  Date     Abdominal pain, lower 10/10/2006    mid to lower quadrant. Under investigation      Atypical chest pain      Cat bite     Hospitalized      Generalized anxiety disorder      Goiter      Low back pain 10/10/2006    Right. Under investigation      NVD (normal vaginal delivery)      2, Para 1with 1 spontaneous       Postmenopausal      Special screening for malignant neoplasms, colon      Past Surgical History:      Procedure  Laterality Date     PAST SURGICAL HISTORY       Hospitalized for cat bite~ Lymph node resection x 3 at left axilla for benign disease~Notes prior complications from anesthesia:       [place] COLONOSCOPY REMOVE FARHAD POLYP LESN HOT BPSY FORCEP  2012            TONSILLECTOMY       Other:  None  Bleeding tendencies:  No    Relevant Family History:  None    Relevant Social History:  None    A relevant review of systems was performed and was negative.    ALLERGIES/SENSITIVITIES:   Allergies      Allergen   Reactions     Penicillins  *Unknown     Any \"cillins\" Skin burn from inside out /told to not use        Zithromax [Azithromycin]  Vomiting        CURRENT MEDICATIONS:    Current Facility-Administered Medications        Medication  Dose Route Frequency Last Rate     lactated " ringers infusion  25 mL/hr Intravenous continuous       lidocaine (1%) injection 0.1-1 mL  0.1-1 mL Intra-Dermal one time prn       sodium chloride 0.9% 5 mL syringe (NORMAL SALINE)  5 mL Intravenous Each Time PRN        Prior to Admission medications    Not on File       PRE-SEDATION ASSESSMENT:    Lung Exam:  Normal  Heart Exam:  Normal    Comment(s):      IMPRESSION:  Need for screening colonoscopy.    PLAN:  I discussed screening colonoscopy with the patient.    Jada Bullard MD

## 2018-01-05 NOTE — OR POSTOP
Patient Information     Patient Name MRN Sex Ria Hudson [id number] Female 1950      OR PostOp by Juancarlos Figueroa RN at 2017 10:30 AM     Author:  Juancarlos Figueroa RN Service:  (none) Author Type:  NURS- Registered Nurse     Filed:  2017 10:30 AM Date of Service:  2017 10:30 AM Status:  Signed     :  Juancarlos Figueroa RN (NURS- Registered Nurse)            Discharge Note    Data:  Ria Warren has been discharged home at 1025 via ambulatory accompanied by Registered Nurse.      Action:  Written discharge/follow-up instructions were provided to patient. Prescriptions : None.  Belongings sent with patient. Medications from home sent with patient/family: Not Applicable  Equipment none .     Response:  Patient verbalized understanding of discharge instructions, reason for discharge, and necessary follow-up appointments.     Steady on feet at discharge

## 2018-01-05 NOTE — PROCEDURES
Patient Information     Patient Name MRRia Cloud [id number] Female 1950      Procedures by Jada Bullard MD at 2017  9:46 AM     Author:  Jada Bullard MD Service:  (none) Author Type:  Physician     Filed:  2017  9:50 AM Date of Service:  2017  9:46 AM Status:  Signed     :  Jada Bullard MD (Physician)        Pre-procedure Diagnoses:    1. History of colonic polyps [Z86.010]    2. Special screening for malignant neoplasms, colon [Z12.11]           Post-procedure Diagnoses:    1. Polyp of transverse colon, unspecified type [D12.3]    2. Diverticulosis of colon without diverticulitis [K57.30]           Procedures:    1. [place] COLONOSCOPY REMOVE FARHAD POLYP LESN HOT BPSY FORCEP [39064.0]               PROCEDURE NOTE    SURGEON: Jada Bullard MD.    PRE-OP DIAGNOSIS:  Screening Colonoscopy      POST-OP DIAGNOSIS: colon polyp transverse colon, diverticula of sigmoid colon    PROCEDURE:  Colonoscopy with polypectomies -hot forceps    ESTIMATED BLOOD LOSS: none    COMPLICATIONS:  None    SPECIMEN:  Transverse colon polyp    ANESTHESIA:  See anesthesia note    INDICATION FOR THE PROCEDURE: The patient is a 66 y.o. female. The patient has no complaints-she had colon polyps 5 years ago. I explained to the patient the risks, benefits and alternatives to screening colonoscopy for evaluating the colon for colon polyps and colon cancer. We specifically discussed the risks of bleeding, infection, perforation, potential inability to reach the cecum and the risks of sedation. The patient's questions were answered and the patient wished to proceed. Informed consent paperwork was completed.    PROCEDURE: The patient was taken to the endoscopy suite. Appropriate monitors were attached. The patient was placed in the left lateral decubitus position.Timeout was performed confirming the patient's identity and procedure to be performed.  After appropriate sedation was confirmed, digital  rectal exam was performed.  There was normal tone and no gross abnormality was noted. The lubricated colonoscope was introduced into the anus the colon was insufflated with air. The prep quality was adequate. Under direct visualization the scope was advanced to the cecum. The ileocecal valve was intubated and the terminal ileum inspected. No gross abnormality was noted. The scope was withdrawn back into the cecum. The mucosa of colon was inspected while withdrawing the scope. A 3 mm sessile polyp was noted in the proximal transverse colon and removed with hot forceps. Diverticula were noted in the sigmoid colon. The scope was retroflexed in the rectum and the anorectal junction was inspected. No abnormalities were noted. The scope was returned to a neutral position and the colon was decompressed. The scope was removed. The patient tolerated the procedure with no immediately apparent complication. The patient was taken to recovery in stable condition.    FOLLOW UP:  RECOMMEND high fiber diet, follow up: will call with pathology results.    Jada Bullard MD

## 2018-01-05 NOTE — OR ANESTHESIA
Patient Information     Patient Name MRN Sex Ria Hudson [id number] Female 1950      OR Anesthesia by Kumar Cornejo CRNA at 2017  9:08 AM     Author:  Kumar Cornejo CRNA Service:  (none) Author Type:  NURS- Nurse Anesthetist     Filed:  2017  9:09 AM Date of Service:  2017  9:08 AM Status:  Signed     :  Kumar Cornejo CRNA (NURS- Nurse Anesthetist)            ANESTHESIAPREOP      PREANESTHETIC EXAM    Ria Warren is a 66 y.o. female    /77  Pulse 79  Temp 97.6  F (36.4  C)  Resp 16  SpO2 97%  Breastfeeding? No  There is no height or weight on file to calculate BMI.    ALLERGIES    Penicillins and Zithromax [azithromycin]      PAST MEDICAL HISTORY    Past Medical History:     Diagnosis  Date     Abdominal pain, lower 10/10/2006    mid to lower quadrant. Under investigation      Atypical chest pain      Cat bite     Hospitalized      Generalized anxiety disorder      Goiter      Low back pain 10/10/2006    Right. Under investigation      NVD (normal vaginal delivery)      2, Para 1with 1 spontaneous       Postmenopausal      Special screening for malignant neoplasms, colon        Patient Active Problem List     Diagnosis  Code     VAGINITIS, ATROPHIC, POSTMENOPAUSAL N95.2     KIYA (generalized anxiety disorder) F41.1     Multiple thyroid nodules E04.2     Seasonal allergic rhinitis J30.2     Irritable bowel syndrome K58.9     Special screening for malignant neoplasms, colon Z12.11       Family History       Problem   Relation Age of Onset     Cancer-breast  Mother      50's       Cancer-colon  Maternal Grandfather      Colon       Diabetes  Maternal Grandfather      Other  Other      Maternal side heart disease       Blood Disease  Neg.      no blood clotting disorders       Thyroid Disease  Other      multiple women with goiters       Other  Neg.      no SLE, no RA, no sjogren's       Heart failure  Maternal  Grandmother      Cancer-ovarian  No Family History        Past Surgical History:      Procedure  Laterality Date     PAST SURGICAL HISTORY      2004 Hospitalized for cat bite~1990 Lymph node resection x 3 at left axilla for benign disease~Notes prior complications from anesthesia:       [place] COLONOSCOPY REMOVE FARHAD POLYP LESN HOT BPSY FORCEP  9/13/2012            TONSILLECTOMY         Major Anesthetic Reactions: none    PMH/PSH Reviewed      History    Smoking Status      Never Smoker   Smokeless Tobacco      Never Used     History    Alcohol Use No       Medications have been reviewed in coordination with proposed intra-procedure medications.    No prescriptions prior to admission.       Recent Labs  No results found for this visit on 05/17/17.    NPO Status Noted:  Yes    Airway Class:  2    ASA Physical Status: 2    ANESTHETIC PLAN    Anesthetic Plan: Mac    The risks, benefits, and alternatives of the procedure were discussed.    Postop Note: Please see the chart for the postop note.    Kumar Cornejo CRNA ....................  5/17/2017   9:08 AM

## 2018-01-05 NOTE — OR NURSING
Patient Information     Patient Name MRN Ria Art [id number] Female 1950      OR Nursing by Ila Jerome RN at 2017  9:18 AM     Author:  Ila Jerome RN Service:  (none) Author Type:  NURS- Registered Nurse     Filed:  2017  9:18 AM Date of Service:  2017  9:18 AM Status:  Signed     :  Ila Jerome RN (NURS- Registered Nurse)            Hands off report received from Tawana Figueroa RN before transfer to Operating Room.

## 2018-01-26 VITALS
HEART RATE: 58 BPM | TEMPERATURE: 98.3 F | BODY MASS INDEX: 27.69 KG/M2 | WEIGHT: 151.4 LBS | DIASTOLIC BLOOD PRESSURE: 64 MMHG | RESPIRATION RATE: 16 BRPM | SYSTOLIC BLOOD PRESSURE: 112 MMHG

## 2018-01-26 VITALS
DIASTOLIC BLOOD PRESSURE: 80 MMHG | WEIGHT: 152 LBS | HEIGHT: 60 IN | HEART RATE: 60 BPM | SYSTOLIC BLOOD PRESSURE: 130 MMHG | BODY MASS INDEX: 29.84 KG/M2

## 2018-01-26 VITALS
BODY MASS INDEX: 27.89 KG/M2 | WEIGHT: 152.5 LBS | DIASTOLIC BLOOD PRESSURE: 82 MMHG | SYSTOLIC BLOOD PRESSURE: 130 MMHG | HEART RATE: 64 BPM

## 2018-01-31 ASSESSMENT — ANXIETY QUESTIONNAIRES: GAD7 TOTAL SCORE: 0

## 2018-02-01 ENCOUNTER — HISTORY (OUTPATIENT)
Dept: PEDIATRICS | Facility: OTHER | Age: 68
End: 2018-02-01

## 2018-02-01 ENCOUNTER — AMBULATORY - GICH (OUTPATIENT)
Dept: PEDIATRICS | Facility: OTHER | Age: 68
End: 2018-02-01

## 2018-02-01 DIAGNOSIS — L81.9 DISORDER OF PIGMENTATION: ICD-10-CM

## 2018-02-01 ASSESSMENT — ANXIETY QUESTIONNAIRES
4. TROUBLE RELAXING: NOT AT ALL
2. NOT BEING ABLE TO STOP OR CONTROL WORRYING: NOT AT ALL
6. BECOMING EASILY ANNOYED OR IRRITABLE: SEVERAL DAYS
5. BEING SO RESTLESS THAT IT IS HARD TO SIT STILL: NOT AT ALL
GAD7 TOTAL SCORE: 4
7. FEELING AFRAID AS IF SOMETHING AWFUL MIGHT HAPPEN: NOT AT ALL
3. WORRYING TOO MUCH ABOUT DIFFERENT THINGS: NEARLY EVERY DAY
1. FEELING NERVOUS, ANXIOUS, OR ON EDGE: NOT AT ALL

## 2018-02-02 ENCOUNTER — DOCUMENTATION ONLY (OUTPATIENT)
Dept: FAMILY MEDICINE | Facility: OTHER | Age: 68
End: 2018-02-02

## 2018-02-02 PROBLEM — J30.2 SEASONAL ALLERGIC RHINITIS: Status: ACTIVE | Noted: 2017-04-27

## 2018-02-02 PROBLEM — Z12.11 SPECIAL SCREENING FOR MALIGNANT NEOPLASMS, COLON: Status: ACTIVE | Noted: 2017-05-16

## 2018-02-02 PROBLEM — K58.9 IRRITABLE BOWEL SYNDROME: Status: ACTIVE | Noted: 2017-04-27

## 2018-02-09 VITALS
DIASTOLIC BLOOD PRESSURE: 80 MMHG | HEART RATE: 90 BPM | HEIGHT: 60 IN | SYSTOLIC BLOOD PRESSURE: 112 MMHG | BODY MASS INDEX: 29.53 KG/M2 | WEIGHT: 150.4 LBS

## 2018-02-10 ASSESSMENT — ANXIETY QUESTIONNAIRES: GAD7 TOTAL SCORE: 4

## 2018-02-12 NOTE — PROGRESS NOTES
Patient Information     Patient Name MRN Ria Art [id number] Female 1950      Progress Notes by Maeve Skaggs at 2017 10:50 AM     Author:  Maeve Skaggs Service:  (none) Author Type:  (none)     Filed:  2017  3:58 PM Encounter Date:  2017 Status:  Signed     :  Maeve Skaggs            See scanned document.

## 2018-02-13 NOTE — NURSING NOTE
Patient Information     Patient Name MRN Sex Ria Hudson [id number] Female 1950      Nursing Note by Alanna Myers at 2018  8:45 AM     Author:  Alanna Myers Service:  (none) Author Type:  (none)     Filed:  2018  9:12 AM Encounter Date:  2018 Status:  Signed     :  Alanna Myers            Universal Protocol    A. Pre-procedure verification complete yes  1-relevant information / documentation available, reviewed and properly matched to the patient; 2-consent accurate and complete, 3-equipment and supplies available    B. Site marking complete N/A  Site marked if not in continuous attendance with patient    C. TIME OUT completed yes  Time Out was conducted just prior to starting procedure to verify the eight required elements: 1-patient identity, 2-consent accurate and complete, 3-position, 4-correct side/site marked (if applicable), 5-procedure, 6-relevant images / results properly labeled and displayed (if applicable), 7-antibiotics / irrigation fluids (if applicable), 8-safety precautions.        Alanna Myers LPN ....................  2018   8:57 AM

## 2018-02-13 NOTE — NURSING NOTE
Patient Information     Patient Name MRN Ria Art [id number] Female 1950      Nursing Note by Neetu Cline at 2018  8:45 AM     Author:  Neetu Cline Service:  (none) Author Type:  (none)     Filed:  2018  9:12 AM Encounter Date:  2018 Status:  Signed     :  Neetu Cline            Patient present to have a mole removed under right breast  Neetu Cline LPN 2018 8:50 AM

## 2018-02-13 NOTE — PROGRESS NOTES
Patient Information     Patient Name MRN Ria Art [id number] Female 1950      Progress Notes by Eduardo Nova MD at 2018  8:45 AM     Author:  Eduardo Nova MD Service:  (none) Author Type:  Physician     Filed:  2018  9:24 AM Encounter Date:  2018 Status:  Signed     :  Eduardo Nova MD (Physician)            PROCEDURE NOTE:  Skin lesion removal  CHIEF COMPLAINT:  concerning mole(s)    HISTORY:  67 y.o. female here for mole removal. Lesion has been present for months. Other symptoms include itching, bleeding.    Allergies: Reviewed in EMR  Medications: Reviewed in EMR  PMH: Reviewed in EMR    /80 (Cuff Site: Right Arm, Position: Sitting, Cuff Size: Adult Regular)  Pulse 90  Ht 1.524 m (5')  Wt 68.2 kg (150 lb 6.4 oz)  BMI 29.37 kg/m2    Locations & Descriptions:  #1: right chest wall under breast -- 1 cm, dried blood, roughened   #2: right forearm -- 3 mm, irregular darkened pigment    ASSESSMENT:      ICD-10-CM    1. Atypical pigmented skin lesion L81.9 pathology specimen      pathology specimen     Discussed risk of pain, blistering, infection, scaring, pigment changes, and recurrence or need for retreatment.  Benefits of treatment and alternative treatments were discussed Consent obtained, scanned into EMR Time-out performed. Lesion swabbed with alcohol, and anesthetized using lidocaine with epi.  Aseptic technique used, including cleansing with Chlorhexidine swabs.  Lesion(s) were excised using shave technique.  Hemostasis achieved using direct pressure and aluminum chloride.   Cleaned, then covered with bacitracin and dressing.      PLAN:   -- Expected clinical course discussed   -- Warning signs discussed   -- Educated on wound care   -- Specimen sent to lab   -- Will call or send letter with results   -- Return to clinic if there is any concern of infection/bleeding/other, else as needed    Signed, Eduardo Nova MD  Internal Medicine &  Pediatrics

## 2018-07-23 NOTE — PROGRESS NOTES
Patient Information     Patient Name  Ria Warren MRN  [id number] Sex  Female   1950      Letter by Greyson Velez MD at      Author:  Greyson Velez MD Service:  (none) Author Type:  (none)    Filed:   Date of Service:   Status:  (Other)       [facility]  [address]  [place] [address]  [phone]         Ria Warren   [address]  [facility] [address]      2017  Date of Breast Imagin/15/2017 10:34 AM    Dear Ms. Warren:    We are pleased to inform you that the result of your recent breast imaging examination is normal/benign (not cancer).    A report of your results was sent to your health care provider(s).    Your images will become part of your medical file here at [facility] and will be available for your continuing care. You are responsible for informing any new health care provider or breast imaging facility of the date and location of this examination.    Although mammography is the most accurate method for early detection, not all cancers are found through mammography. If you notice any new changes in your breast(s) please inform your health care provider without delay.    Thank you for choosing [facility] to participate in your healthcare needs.         [facility] Recommendations for Early Breast Cancer Detection   in Women without Symptoms  When to start having mammograms to screen for breast cancer, and how often to have them, is a personal decision. It should be based on your preferences, your values and your risk for developing breast cancer. [facility] recommends that you and your health care provider together determine when mammograms are right for you.    [facility] recommends the following guidelines for women who have an average risk for breast cancer, based on American Cancer Society guidelines:    Age 40 to 44: Mammograms are  optional.     Age 45 to 54: Have a mammogram every year.           Age 55 and older: Have a mammogram every year, or transition to having one every 2 years. Continue to have mammograms as long as your health is good.    If you have a higher than average risk for breast cancer, your health care provider may recommend a different schedule.

## 2018-07-23 NOTE — PROGRESS NOTES
Patient Information     Patient Name  Ria Warren MRN  [id number] Sex  Female   1950      Letter by Eduardo Nova MD at      Author:  Eduardo Nova MD Service:  (none) Author Type:  (none)    Filed:   Encounter Date:  2018 Status:  (Other)           Ria Warren  [address]  [facility] [address]          2018    Dear Ms. Warren:    Your biopsies are both benign.  No further testing is needed.    If you have any questions or concerns, please call the clinic at [phone].    SignedEduardo MD  Internal Medicine & Pediatrics

## 2018-07-23 NOTE — PROGRESS NOTES
Patient Information     Patient Name  Ria Warren MRN  [id number] Sex  Female   1950      Letter by Nu Murcia NP at      Author:  Nu Murcia NP Service:  (none) Author Type:  (none)    Filed:   Encounter Date:  2017 Status:  (Other)           Ria Warren  [address]  [facility] [address]          May 7, 2017    Dear Ms. Warren:    I am happy to report the result from the Pap and HPV (Human Papilloma Virus) test(s) you had done at your recent clinic visit came back as normal, with a past history of normal Paps and the same partner, no more routine screening is indicated, however if you at any time experience any spotting, pelvic pain or pressure or any other abnormal pelvic symptoms  you would need to come in to the clinic to have this further investigated  and this would be diagnostic, not screening.    Please don't hesitate to call at any time if you have any questions.    It was a pleasure meeting you.      Thank you for choosing [facility] to participate in your healthcare needs.    Sincerely,    Nu Murcia NP ....................  2017   2:53 PM          The [facility] is a statewide comprehensive breast and cervical cancer screening program that provides free screening and follow-up services (including colposcopy) to uninsured and underinsured women. For more information call toll free [phone] ([phone])

## 2018-07-24 NOTE — PROGRESS NOTES
Patient Information     Patient Name  Ria Warren MRN  [id number] Sex  Female   1950      Letter by Eduardo Nova MD at      Author:  Eduardo Nova MD Service:  (none) Author Type:  (none)    Filed:   Encounter Date:  2017 Status:  (Other)           Ria Warren  [address]  [place] [address]          2017    Dear Ms. Warren:      Blood work looks fine.  Cholesterol remains borderline.      Results for orders placed or performed in visit on 17      TSH      Result  Value Ref Range    TSH 0.53 0.34 - 5.60 uIU/mL   LIPID PANEL      Result  Value Ref Range    CHOLESTEROL,TOTAL 212 (H) <200 mg/dL    TRIGLYCERIDES 81 <150 mg/dL    HDL CHOLESTEROL 62 23 - 92 mg/dL    NON-HDL CHOLESTEROL 150 (H) <145 mg/dl    CHOL/HDL RATIO            3.42 <4.50                    LDL CHOLESTEROL 134 (H) <100 mg/dL    PATIENT STATUS            NOT GIVEN                   BASIC METABOLIC PANEL      Result  Value Ref Range    SODIUM 138 133 - 143 mmol/L    POTASSIUM 3.9 3.5 - 5.1 mmol/L    CHLORIDE 104 98 - 107 mmol/L    CO2,TOTAL 27 21 - 31 mmol/L    ANION GAP 7 5 - 18                    GLUCOSE 80 70 - 105 mg/dL    CALCIUM 9.4 8.6 - 10.3 mg/dL    BUN 18 7 - 25 mg/dL    CREATININE 0.60 (L) 0.70 - 1.30 mg/dL    BUN/CREAT RATIO           30                    GFR if African American >60 >60 ml/min/1.73m2    GFR if not African American >60 >60 ml/min/1.73m2   CBC WITH AUTO DIFFERENTIAL      Result  Value Ref Range    WHITE BLOOD COUNT         6.3 4.5 - 11.0 thou/cu mm    RED BLOOD COUNT           4.27 4.00 - 5.20 mil/cu mm    HEMOGLOBIN                13.2 12.0 - 16.0 g/dL    HEMATOCRIT                41.0 33.0 - 51.0 %    MCV                       96 80 - 100 fL    MCH                       30.9 26.0 - 34.0 pg    MCHC                      32.2 32.0 - 36.0 g/dL    RDW                       12.0 11.5 - 15.5 %    PLATELET COUNT            237 140 - 440 thou/cu mm    MPV                       6.5  6.5 - 11.0 fL    NEUTROPHILS               60.4 42.0 - 72.0 %    LYMPHOCYTES               32.1 20.0 - 44.0 %    MONOCYTES                 5.6 <12.0 %    EOSINOPHILS               1.3 <8.0 %    BASOPHILS                 0.6 <3.0 %    ABSOLUTE NEUTROPHILS      3.8 1.7 - 7.0 thou/cu mm    ABSOLUTE LYMPHOCYTES      2.0 0.9 - 2.9 thou/cu mm    ABSOLUTE MONOCYTES        0.3 <0.9 thou/cu mm    ABSOLUTE EOSINOPHILS      0.1 <0.5 thou/cu mm    ABSOLUTE BASOPHILS        0.0 <0.3 thou/cu mm     If you have any questions or concerns, please call the clinic at [phone].    Signed, Eduardo Nova MD  Internal Medicine & Pediatrics        Greetings, Dr. Nova here.  I'd like to ask you to reconsider if [employer] would be right for you.  If you're not comfortable using a computer or smart phone, disregard this message and you won't hurt my feelings.    [employer] is an easier and more secure way for us to communicate with each other.  With [employer], you can quickly and easily view your health information and appointments at your clinic at any time and anywhere you have Internet access.  We even have secure access via your iPhone, iPad or other smart phone.  To learn more about [employer], please go to [email or web address]    To get started, you will need:     the [employer] web site ([email or web address])    your [employer] activation code:  [email or web address]    Expires: 6/11/2017  2:37 PM    the last 4 digits of your Social Security number (SSN)    your date of birth.      Remember to activate your account quickly -   Your activation code expires in 45 days!    In the event you have technical difficulties, please call   [employer] [facility] at [phone].

## 2018-08-03 ENCOUNTER — HOSPITAL ENCOUNTER (OUTPATIENT)
Dept: MAMMOGRAPHY | Facility: OTHER | Age: 68
Discharge: HOME | End: 2018-08-03
Attending: INTERNAL MEDICINE | Admitting: INTERNAL MEDICINE
Payer: MEDICARE

## 2018-08-03 ENCOUNTER — OFFICE VISIT (OUTPATIENT)
Dept: PEDIATRICS | Facility: OTHER | Age: 68
End: 2018-08-03
Attending: INTERNAL MEDICINE
Payer: MEDICARE

## 2018-08-03 VITALS
WEIGHT: 149 LBS | SYSTOLIC BLOOD PRESSURE: 118 MMHG | BODY MASS INDEX: 29.25 KG/M2 | HEART RATE: 80 BPM | DIASTOLIC BLOOD PRESSURE: 70 MMHG | HEIGHT: 60 IN

## 2018-08-03 DIAGNOSIS — Z12.39 SCREENING BREAST EXAMINATION: ICD-10-CM

## 2018-08-03 DIAGNOSIS — M94.0 ACUTE COSTOCHONDRITIS: Primary | ICD-10-CM

## 2018-08-03 PROCEDURE — 77063 BREAST TOMOSYNTHESIS BI: CPT

## 2018-08-03 PROCEDURE — 99213 OFFICE O/P EST LOW 20 MIN: CPT | Performed by: INTERNAL MEDICINE

## 2018-08-03 PROCEDURE — G0463 HOSPITAL OUTPT CLINIC VISIT: HCPCS

## 2018-08-03 PROCEDURE — G0463 HOSPITAL OUTPT CLINIC VISIT: HCPCS | Mod: 25

## 2018-08-03 ASSESSMENT — PATIENT HEALTH QUESTIONNAIRE - PHQ9: 5. POOR APPETITE OR OVEREATING: NOT AT ALL

## 2018-08-03 ASSESSMENT — ANXIETY QUESTIONNAIRES
GAD7 TOTAL SCORE: 0
7. FEELING AFRAID AS IF SOMETHING AWFUL MIGHT HAPPEN: NOT AT ALL
5. BEING SO RESTLESS THAT IT IS HARD TO SIT STILL: NOT AT ALL
2. NOT BEING ABLE TO STOP OR CONTROL WORRYING: NOT AT ALL
6. BECOMING EASILY ANNOYED OR IRRITABLE: NOT AT ALL
3. WORRYING TOO MUCH ABOUT DIFFERENT THINGS: NOT AT ALL
IF YOU CHECKED OFF ANY PROBLEMS ON THIS QUESTIONNAIRE, HOW DIFFICULT HAVE THESE PROBLEMS MADE IT FOR YOU TO DO YOUR WORK, TAKE CARE OF THINGS AT HOME, OR GET ALONG WITH OTHER PEOPLE: NOT DIFFICULT AT ALL
1. FEELING NERVOUS, ANXIOUS, OR ON EDGE: NOT AT ALL

## 2018-08-03 ASSESSMENT — PAIN SCALES - GENERAL: PAINLEVEL: MILD PAIN (3)

## 2018-08-03 NOTE — PROGRESS NOTES
Subjective  Ria Warren is a 67 year old female who presents for right-sided rib pain.  In June she tripped walking down the sidewalk in [place].  She landed on the right side of her rib cage.  She was looking at a building and the dog was pulling her the other direction.  She had pain along the right ribs.  It gradually improved.  The last week and a half and feels like it got worse again.  Worse with moving or lifting, getting up out of a better chair or sitting.  Better with lying flat.  No cough.  If she does cough she does have a chest pain.  Also hurts with deep breathing.  No lower cavity edema.  No posterior calf pain.  No productive cough.  Just before the pain worsened she was doing some heavy lifting including lifting lumber and a 40 pound bag of levon litter.    Problem List/PMH: reviewed in EMR, and made relevant updates today.  Medications: reviewed in EMR, and made relevant updates today.  Allergies: reviewed in EMR, and made relevant updates today.    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.    Granddaughter with Down's and congenital heart disease.  ,  [employer]. Wants to renovate the family barn near [place]. Animal lover.     I reviewed social history and made relevant updates today.    Family Hx:   Family History   Problem Relation Age of Onset     Breast Cancer Mother      Cancer-breast,50's     Colon Cancer Maternal Grandfather      Cancer-colon,Colon     Diabetes Maternal Grandfather      Diabetes     Heart Failure Maternal Grandmother      Heart failure     Other - See Comments Other      Maternal side heart disease     Thyroid Disease Other      Thyroid Disease,multiple women with goiters     Blood Disease No family hx of      Blood Disease,no blood clotting disorders     Ovarian Cancer No  family hx of      Cancer-ovarian       Objective  Vitals: reviewed in EMR.  /70 (BP Location: Right arm, Patient Position: Sitting, Cuff Size: Adult Large)  Pulse 80  Ht 5' (1.524 m)  Wt 149 lb (67.6 kg)  BMI 29.1 kg/m2    Gen: Pleasant female, NAD.  HEENT: MMM, no OP erythema.   Neck: Supple, no JVD, no bruits.  CV: RRR no m/r/g.   Pulm: CTAB no w/r/r  Chest: Tenderness to palpation over the lowest ribs on the anterolateral area without crepitus.  Neuro: Grossly intact  Msk: No lower extremity edema.  Skin: No concerning lesions.  Psychiatric: Normal affect and insight. Does not appear anxious or depressed.      Assessment    ICD-10-CM    1. Acute costochondritis M94.0      I think the most likely etiology is costochondritis which was initially injured at the fall and then had been healing but again inflamed/injured by the heavy lifting.  Differential diagnosis also includes early bacterial pneumonia, delayed fracture healing, occult malignancy, others.  If symptoms persist or worsen recommend repeat evaluation as would likely obtain at least chest x-ray, possibly CT.    Plan   -- Expected clinical course discussed   -- Medications and their side effects discussed  Patient Instructions      -- Rest   -- Ice   -- Ibuprofen   -- Topicals (eg aspercreme)   -- Consider chiropractic   -- If not improving over 2 weeks, return for recheck      Costochondritis    Costochondritis is inflammation of a rib or the cartilage that connects a rib to your breastbone (sternum). It causes tenderness, and sometimes chest pain may be sharp or aching, or it may feel like pressure. Pain may get worse with deep breathing, movement, or exercise. In some cases, the pain is mistaken for a heart attack. Despite this, the condition is not serious. Read on to learn more about the condition and how it can be treated.  What causes costochondritis?  The cause of costochondritis is not completely clear, but it may happen after a chest  injury, chest infection, or coughing episode. Some physical activities can sometimes lead to costochondritis. Large-breasted women may be more likely to have the condition. Often, the reason for the inflammation is unknown.  Diagnosing costochondritis  There is no test for costochondritis. The condition is diagnosed by the symptoms you have. Your healthcare provider will perform a physical exam. He or she will ask you about your symptoms and examine your chest for tenderness. In some cases, tests are done to rule out more serious problems. These tests may include imaging tests such as chest X-ray, CT scan, or an ECG.  Treating costochondritis  If an underlying cause is found, treatment for that will likely relieve the problem. Costochondritis often goes away on its own. The course of the condition varies from person to person. It usually lasts from weeks to months. In some cases, mild symptoms continue for months to years. To ease symptoms:    Take medicine as directed. These relieve pain and swelling. Ibuprofen or other NSAIDs are often recommended. In some cases, you may be given prescription medicine, such as muscle relaxants.    Avoid activities that put stress on the chest or spine.    Apply a heating pad (set to warm, not too high, heat) to the breastbone several times a day.    Perform stretching exercises as directed.  Call the healthcare provider right away if you have any of the following:    Pain that is not relieved by medicine    Shortness of breath    Lightheadedness, dizziness, or fainting    Feeling of irregular heartbeat or fast pulse  Anyone with chest pain should see a healthcare provider, especially those who are older and may be at risk for heart disease.   Date Last Reviewed: 10/1/2016    4759-7211 [employer]. [address] [place] [address], [place] [address]. All rights reserved. This information is not intended as a substitute for professional medical care. Always follow your  healthcare professional's instructions.            No Follow-up on file.    Signed, Eduardo Nova MD  Internal Medicine & Pediatrics

## 2018-08-03 NOTE — PATIENT INSTRUCTIONS
-- Rest   -- Ice   -- Ibuprofen   -- Topicals (eg aspercreme)   -- Consider chiropractic   -- If not improving over 2 weeks, return for recheck      Costochondritis    Costochondritis is inflammation of a rib or the cartilage that connects a rib to your breastbone (sternum). It causes tenderness, and sometimes chest pain may be sharp or aching, or it may feel like pressure. Pain may get worse with deep breathing, movement, or exercise. In some cases, the pain is mistaken for a heart attack. Despite this, the condition is not serious. Read on to learn more about the condition and how it can be treated.  What causes costochondritis?  The cause of costochondritis is not completely clear, but it may happen after a chest injury, chest infection, or coughing episode. Some physical activities can sometimes lead to costochondritis. Large-breasted women may be more likely to have the condition. Often, the reason for the inflammation is unknown.  Diagnosing costochondritis  There is no test for costochondritis. The condition is diagnosed by the symptoms you have. Your healthcare provider will perform a physical exam. He or she will ask you about your symptoms and examine your chest for tenderness. In some cases, tests are done to rule out more serious problems. These tests may include imaging tests such as chest X-ray, CT scan, or an ECG.  Treating costochondritis  If an underlying cause is found, treatment for that will likely relieve the problem. Costochondritis often goes away on its own. The course of the condition varies from person to person. It usually lasts from weeks to months. In some cases, mild symptoms continue for months to years. To ease symptoms:    Take medicine as directed. These relieve pain and swelling. Ibuprofen or other NSAIDs are often recommended. In some cases, you may be given prescription medicine, such as muscle relaxants.    Avoid activities that put stress on the chest or spine.    Apply a  heating pad (set to warm, not too high, heat) to the breastbone several times a day.    Perform stretching exercises as directed.  Call the healthcare provider right away if you have any of the following:    Pain that is not relieved by medicine    Shortness of breath    Lightheadedness, dizziness, or fainting    Feeling of irregular heartbeat or fast pulse  Anyone with chest pain should see a healthcare provider, especially those who are older and may be at risk for heart disease.   Date Last Reviewed: 10/1/2016    5769-9902 [employer] [address] [place] [address]. All rights reserved. This information is not intended as a substitute for professional medical care. Always follow your healthcare professional's instructions.

## 2018-08-03 NOTE — MR AVS SNAPSHOT
After Visit Summary   8/3/2018    Ria Warren    MRN: [id number]           Patient Information     Date Of Birth          1950        Visit Information        Provider Department      8/3/2018 9:45 AM Eduardo Nova MD [facility]        Today's Diagnoses     Acute costochondritis    -  1      Care Instructions     -- Rest   -- Ice   -- Ibuprofen   -- Topicals (eg aspercreme)   -- Consider chiropractic   -- If not improving over 2 weeks, return for recheck      Costochondritis    Costochondritis is inflammation of a rib or the cartilage that connects a rib to your breastbone (sternum). It causes tenderness, and sometimes chest pain may be sharp or aching, or it may feel like pressure. Pain may get worse with deep breathing, movement, or exercise. In some cases, the pain is mistaken for a heart attack. Despite this, the condition is not serious. Read on to learn more about the condition and how it can be treated.  What causes costochondritis?  The cause of costochondritis is not completely clear, but it may happen after a chest injury, chest infection, or coughing episode. Some physical activities can sometimes lead to costochondritis. Large-breasted women may be more likely to have the condition. Often, the reason for the inflammation is unknown.  Diagnosing costochondritis  There is no test for costochondritis. The condition is diagnosed by the symptoms you have. Your healthcare provider will perform a physical exam. He or she will ask you about your symptoms and examine your chest for tenderness. In some cases, tests are done to rule out more serious problems. These tests may include imaging tests such as chest X-ray, CT scan, or an ECG.  Treating costochondritis  If an underlying cause is found, treatment for that will likely relieve the problem. Costochondritis often goes away on its own. The course of the condition varies from person to person. It usually  lasts from weeks to months. In some cases, mild symptoms continue for months to years. To ease symptoms:    Take medicine as directed. These relieve pain and swelling. Ibuprofen or other NSAIDs are often recommended. In some cases, you may be given prescription medicine, such as muscle relaxants.    Avoid activities that put stress on the chest or spine.    Apply a heating pad (set to warm, not too high, heat) to the breastbone several times a day.    Perform stretching exercises as directed.  Call the healthcare provider right away if you have any of the following:    Pain that is not relieved by medicine    Shortness of breath    Lightheadedness, dizziness, or fainting    Feeling of irregular heartbeat or fast pulse  Anyone with chest pain should see a healthcare provider, especially those who are older and may be at risk for heart disease.   Date Last Reviewed: 10/1/2016    9046-3839 [employer]. [address] [place] [address]. All rights reserved. This information is not intended as a substitute for professional medical care. Always follow your healthcare professional's instructions.                Follow-ups after your visit        Who to contact     If you have questions or need follow up information about today's clinic visit or your schedule please contact [facility] directly at [phone].  Normal or non-critical lab and imaging results will be communicated to you by Ecasthart, letter or phone within 4 business days after the clinic has received the results. If you do not hear from us within 7 days, please contact the clinic through Ecasthart or phone. If you have a critical or abnormal lab result, we will notify you by phone as soon as possible.  Submit refill requests through [employer] or call your pharmacy and they will forward the refill request to us. Please allow 3 business days for your refill to be completed.          Additional Information About Your  "Visit        Touchtalenthart Information     Care Team Connect lets you send messages to your doctor, view your test results, renew your prescriptions, schedule appointments and more. To sign up, go to [email or web address] . Click on \"Log in\" on the left side of the screen, which will take you to the Welcome page. Then click on \"Sign up Now\" on the right side of the page.     You will be asked to enter the access code listed below, as well as some personal information. Please follow the directions to create your username and password.     Your access code is: [email or web address]  Expires: 2018  8:57 AM     Your access code will  in 90 days. If you need help or a new code, please call your [facility] clinic or [phone].        Care EveryWhere ID     This is your Care EveryWhere ID. This could be used by other organizations to access your [facility] medical records  [id number]        Your Vitals Were     Pulse Height BMI (Body Mass Index)             80 5' (1.524 m) 29.1 kg/m2          Blood Pressure from Last 3 Encounters:   18 118/70   18 112/80   17 112/64    Weight from Last 3 Encounters:   18 149 lb (67.6 kg)   18 150 lb 6.4 oz (68.2 kg)   17 151 lb 6.4 oz (68.7 kg)              Today, you had the following     No orders found for display       Primary Care Provider Office Phone # Fax #    Eduardo Maxi Nova [phone]       [address] [facility] [address]        Equal Access to Services     [facility] Center[facility] AH: Hadii laury Juarez, ramonita fields, qajessica crockett. So [facility] [phone].    ATENCIÓN: Si habla español, tiene a rapp disposición servicios gratuitos de asistencia lingüística. Leilani al [phone].    We comply with applicable federal civil rights laws and [place] laws. We do not discriminate on the basis of race, color, national origin, age, disability, sex, sexual orientation, or " gender identity.            Thank you!     Thank you for choosing [facility]  for your care. Our goal is always to provide you with excellent care. Hearing back from our patients is one way we can continue to improve our services. Please take a few minutes to complete the written survey that you may receive in the mail after your visit with us. Thank you!             Your Updated Medication List - Protect others around you: Learn how to safely use, store and throw away your medicines at www.disposemymeds.org.      Notice  As of 8/3/2018 10:23 AM    You have not been prescribed any medications.

## 2018-08-03 NOTE — NURSING NOTE
Patient presents to clinic for fall 2 months ago and her ribs on the right side being painful.  Alanna Myers LPN ....................  8/3/2018   9:53 AM

## 2018-08-04 ASSESSMENT — ANXIETY QUESTIONNAIRES: GAD7 TOTAL SCORE: 0

## 2018-08-04 ASSESSMENT — PATIENT HEALTH QUESTIONNAIRE - PHQ9: SUM OF ALL RESPONSES TO PHQ QUESTIONS 1-9: 0

## 2019-12-03 ENCOUNTER — HOSPITAL ENCOUNTER (OUTPATIENT)
Dept: GENERAL RADIOLOGY | Facility: OTHER | Age: 69
Discharge: HOME | End: 2019-12-03
Attending: NURSE PRACTITIONER | Admitting: NURSE PRACTITIONER
Payer: MEDICARE

## 2019-12-03 ENCOUNTER — OFFICE VISIT (OUTPATIENT)
Dept: FAMILY MEDICINE | Facility: OTHER | Age: 69
End: 2019-12-03
Attending: NURSE PRACTITIONER
Payer: MEDICARE

## 2019-12-03 ENCOUNTER — TELEPHONE (OUTPATIENT)
Dept: FAMILY MEDICINE | Facility: OTHER | Age: 69
End: 2019-12-03

## 2019-12-03 VITALS
OXYGEN SATURATION: 98 % | DIASTOLIC BLOOD PRESSURE: 80 MMHG | SYSTOLIC BLOOD PRESSURE: 122 MMHG | HEIGHT: 60 IN | BODY MASS INDEX: 30.26 KG/M2 | TEMPERATURE: 98.3 F | WEIGHT: 154.13 LBS | HEART RATE: 76 BPM | RESPIRATION RATE: 18 BRPM

## 2019-12-03 DIAGNOSIS — L98.9 SKIN LESION: ICD-10-CM

## 2019-12-03 DIAGNOSIS — R07.9 LEFT-SIDED CHEST PAIN: Primary | ICD-10-CM

## 2019-12-03 DIAGNOSIS — R07.9 LEFT-SIDED CHEST PAIN: ICD-10-CM

## 2019-12-03 DIAGNOSIS — R10.2 PELVIC PAIN IN FEMALE: ICD-10-CM

## 2019-12-03 PROCEDURE — G0463 HOSPITAL OUTPT CLINIC VISIT: HCPCS

## 2019-12-03 PROCEDURE — G0463 HOSPITAL OUTPT CLINIC VISIT: HCPCS | Mod: 25

## 2019-12-03 PROCEDURE — 71101 X-RAY EXAM UNILAT RIBS/CHEST: CPT | Mod: LT

## 2019-12-03 PROCEDURE — 99214 OFFICE O/P EST MOD 30 MIN: CPT | Performed by: NURSE PRACTITIONER

## 2019-12-03 ASSESSMENT — ANXIETY QUESTIONNAIRES
2. NOT BEING ABLE TO STOP OR CONTROL WORRYING: NOT AT ALL
GAD7 TOTAL SCORE: 0
6. BECOMING EASILY ANNOYED OR IRRITABLE: NOT AT ALL
7. FEELING AFRAID AS IF SOMETHING AWFUL MIGHT HAPPEN: NOT AT ALL
5. BEING SO RESTLESS THAT IT IS HARD TO SIT STILL: NOT AT ALL
1. FEELING NERVOUS, ANXIOUS, OR ON EDGE: NOT AT ALL
3. WORRYING TOO MUCH ABOUT DIFFERENT THINGS: NOT AT ALL
IF YOU CHECKED OFF ANY PROBLEMS ON THIS QUESTIONNAIRE, HOW DIFFICULT HAVE THESE PROBLEMS MADE IT FOR YOU TO DO YOUR WORK, TAKE CARE OF THINGS AT HOME, OR GET ALONG WITH OTHER PEOPLE: NOT DIFFICULT AT ALL

## 2019-12-03 ASSESSMENT — PATIENT HEALTH QUESTIONNAIRE - PHQ9
SUM OF ALL RESPONSES TO PHQ QUESTIONS 1-9: 0
5. POOR APPETITE OR OVEREATING: NOT AT ALL

## 2019-12-03 ASSESSMENT — MIFFLIN-ST. JEOR: SCORE: 1145.61

## 2019-12-03 ASSESSMENT — PAIN SCALES - GENERAL: PAINLEVEL: NO PAIN (0)

## 2019-12-03 NOTE — PATIENT INSTRUCTIONS
Referral to general surgery for biopsy of lesion on forehead  Order placed for pelvic ultrasound to evaluate your ovaries  Chest and rib x-ray appears to be normal, I will call with the radiology report once these have been read

## 2019-12-03 NOTE — NURSING NOTE
Patient presents to clinic today for multiple concerns. She states she is having left side pain, growth on right side of hairline, and she states she has concerns about her ovaries. She states she has also been fatigued and the side pain may be correlated to fall she had this past summer.     No LMP recorded. Patient is postmenopausal.  Medication Reconciliation: complete    Lisy Cameron LPN  12/3/2019 12:58 PM

## 2019-12-03 NOTE — PROGRESS NOTES
"HPI:    Ria Warren is a 69 year old female who presents to clinic today for multiple concerns.    1.  Left-sided rib concerns.  She reports a heaviness in her left side of her rib and sometimes she will feel the sensation of a lump.  At times this is affected her breathing where she will feel somewhat short of breath.  This is been present for about 1 year.  She did have a fall a little over a year ago and she reports possibly broken ribs.  She is increased symptoms about 3 months after this.  She does not feel any lesions or lumps on the outside of her ribs.  She does not smoke.  No history of asthma or COPD.    2.  Growth on hairline.  She reports she has a large growth to the right side of her forehead right in her hairline.  This is been present for \"years\".  She is unsure if this is gotten any larger.  It is becoming more itchy.  She states she has had a history of removal lesions cancerous and would like to have this when evaluated.    3.  Ovarian concerns.  She reports one time over the summer she had the sensation that her ovaries were ovulating.  This happened again 1 month ago.  She denies any significant pain but had a strange sensation.  She has normal bowel movements and denies any significant vaginal discharge.  Last Pap smear was 2017.  She is concerned because there is a family history of cancer including breast cancer and uterine cancer.  She would like to have this further evaluated.  It is important to note that she is postmenopausal.  Past Medical History:   Diagnosis Date     Asymptomatic menopausal state     No Comments Provided     Bitten by cat     ,Hospitalized     Encounter for full-term uncomplicated delivery      2, Para 1with 1 spontaneous      Encounter for screening for malignant neoplasm of colon     No Comments Provided     Generalized anxiety disorder     No Comments Provided     Low back pain     10/10/2006,Right. Under investigation     Lower abdominal " pain     10/10/2006,mid to lower quadrant. Under investigation     Nontoxic goiter     No Comments Provided     Other chest pain     No Comments Provided       Past Surgical History:   Procedure Laterality Date     COLONOSCOPY      9/13/2012     COLONOSCOPY  05/17/2017 05/17/2017,follow up 2022 tubular adenoma     OTHER SURGICAL HISTORY      37676.0,PAST SURGICAL HISTORY,2004 Hospitalized for cat bite~1990 Lymph node resection x 3 at left axilla for benign disease~Notes prior complications from anesthesia:     TONSILLECTOMY      No Comments Provided       Family History   Problem Relation Age of Onset     Breast Cancer Mother         Cancer-breast,50's     Colon Cancer Maternal Grandfather         Cancer-colon,Colon     Diabetes Maternal Grandfather         Diabetes     Heart Failure Maternal Grandmother         Heart failure     Other - See Comments Other         Maternal side heart disease     Thyroid Disease Other         Thyroid Disease,multiple women with goiters     Blood Disease No family hx of         Blood Disease,no blood clotting disorders     Ovarian Cancer No family hx of         Cancer-ovarian       Social History     Socioeconomic History     Marital status:      Spouse name: Not on file     Number of children: Not on file     Years of education: Not on file     Highest education level: Not on file   Occupational History     Not on file   Social Needs     Financial resource strain: Not on file     Food insecurity:     Worry: Not on file     Inability: Not on file     Transportation needs:     Medical: Not on file     Non-medical: Not on file   Tobacco Use     Smoking status: Never Smoker     Smokeless tobacco: Never Used   Substance and Sexual Activity     Alcohol use: Yes     Alcohol/week: 1.0 standard drinks     Types: 1 Glasses of wine per week     Drug use: Never     Types: Other     Comment: Drug use: No     Sexual activity: Yes     Partners: Male   Lifestyle     Physical activity:      "Days per week: Not on file     Minutes per session: Not on file     Stress: Not on file   Relationships     Social connections:     Talks on phone: Not on file     Gets together: Not on file     Attends Ep[employer]alian service: Not on file     Active member of club or organization: Not on file     Attends meetings of clubs or organizations: Not on file     Relationship status: Not on file     Intimate partner violence:     Fear of current or ex partner: Not on file     Emotionally abused: Not on file     Physically abused: Not on file     Forced sexual activity: Not on file   Other Topics Concern     Parent/sibling w/ CABG, MI or angioplasty before 65F 55M? 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    Granddaughter with Down's and congenital heart disease.  ,  [place]. Wants to renovate the family barn near [place]. Animal lover.       No current outpatient medications on file.       Allergies   Allergen Reactions     Azithromycin      Other reaction(s): Vomiting     Penicillins      Other reaction(s): *Unknown  Any \"cillins\" Skin burn from inside out /told to not use         ROS:  Pertinent positives and negatives are noted in HPI.    EXAM:  /80 (BP Location: Right arm, Patient Position: Sitting, Cuff Size: Adult Regular)   Pulse 76   Temp 98.3  F (36.8  C) (Tympanic)   Resp 18   Ht 1.524 m (5')   Wt 69.9 kg (154 lb 2 oz)   LMP  (LMP Unknown)   SpO2 98%   Breastfeeding No   BMI 30.10 kg/m    General appearance: well appearing female, in no acute distress  Respiratory: clear to auscultation bilaterally  Cardiac: RRR with no murmurs  Abdomen: soft, nontender, no masses or organomegaly, normal bowel sounds  Musculoskeletal: Tenderness with palpation to left lower ribs, no lumps are appreciated  Dermatological: Right upper forehead at the hairline with approximately 2.5 x 2 cm tan-colored lesion with some mild scale  Psychological: normal affect, alert and " pleasant    ASSESSMENT AND PLAN:    1. Left-sided chest pain    2. Pelvic pain in female    3. Skin lesion      Chest x-ray including left side ribs was obtained and read by myself as well as radiologist.  No concerns were noted on this.  Pelvic ultrasound is ordered for further evaluation of her ovaries considering her concerns regarding family history of female cancers.  Referral to general surgery for biopsy of forehead skin lesion.  Follow-up as needed.      RAVI Mancini CNP..................12/3/2019 12:52 PM      This document was prepared using voice generated software.  While every attempt was made for accuracy, grammatical errors may exist.

## 2019-12-04 ASSESSMENT — ANXIETY QUESTIONNAIRES: GAD7 TOTAL SCORE: 0

## 2020-01-03 ENCOUNTER — OFFICE VISIT (OUTPATIENT)
Dept: FAMILY MEDICINE | Facility: OTHER | Age: 70
End: 2020-01-03
Attending: NURSE PRACTITIONER
Payer: MEDICARE

## 2020-01-03 VITALS
HEIGHT: 62 IN | HEART RATE: 77 BPM | RESPIRATION RATE: 16 BRPM | WEIGHT: 152.3 LBS | TEMPERATURE: 99.4 F | OXYGEN SATURATION: 99 % | DIASTOLIC BLOOD PRESSURE: 80 MMHG | SYSTOLIC BLOOD PRESSURE: 116 MMHG | BODY MASS INDEX: 28.03 KG/M2

## 2020-01-03 DIAGNOSIS — H92.09 OTALGIA, UNSPECIFIED LATERALITY: Primary | ICD-10-CM

## 2020-01-03 PROCEDURE — 99207 ZZC NO CHARGE NURSE ONLY: CPT

## 2020-01-03 PROCEDURE — G0463 HOSPITAL OUTPT CLINIC VISIT: HCPCS

## 2020-01-03 ASSESSMENT — MIFFLIN-ST. JEOR: SCORE: 1169.08

## 2020-01-03 ASSESSMENT — PAIN SCALES - GENERAL: PAINLEVEL: EXTREME PAIN (8)

## 2020-01-03 NOTE — PROGRESS NOTES
Patient left without being seen. Patient stated she was waiting for 2 hours and could not stand waiting any longer. Parisa Marquez LPN on 1/3/2020 at 3:07 PM

## 2020-01-03 NOTE — NURSING NOTE
"Patient presents to clinic for ear ache. States pain going down neck, jaw and shoulder, sore throat, pressure in ears, body aches, sweats and chills.   Chief Complaint   Patient presents with     Ear Problem       Initial /80 (BP Location: Right arm, Patient Position: Sitting, Cuff Size: Adult Regular)   Pulse 77   Temp 99.4  F (37.4  C) (Tympanic)   Resp 16   Ht 1.575 m (5' 2\")   Wt 69.1 kg (152 lb 4.8 oz)   LMP  (LMP Unknown)   SpO2 99%   Breastfeeding No   BMI 27.86 kg/m   Estimated body mass index is 27.86 kg/m  as calculated from the following:    Height as of this encounter: 1.575 m (5' 2\").    Weight as of this encounter: 69.1 kg (152 lb 4.8 oz).  Medication Reconciliation: complete    Parisa Marquez LPN    "

## 2020-01-05 ENCOUNTER — HOSPITAL ENCOUNTER (EMERGENCY)
Facility: HOSPITAL | Age: 70
Discharge: HOME | End: 2020-01-05
Attending: NURSE PRACTITIONER | Admitting: NURSE PRACTITIONER
Payer: MEDICARE

## 2020-01-05 VITALS
TEMPERATURE: 99 F | DIASTOLIC BLOOD PRESSURE: 84 MMHG | HEART RATE: 80 BPM | SYSTOLIC BLOOD PRESSURE: 131 MMHG | OXYGEN SATURATION: 95 % | RESPIRATION RATE: 14 BRPM

## 2020-01-05 DIAGNOSIS — J06.9 VIRAL URI WITH COUGH: ICD-10-CM

## 2020-01-05 DIAGNOSIS — H10.33 ACUTE CONJUNCTIVITIS OF BOTH EYES, UNSPECIFIED ACUTE CONJUNCTIVITIS TYPE: ICD-10-CM

## 2020-01-05 DIAGNOSIS — H66.001 NON-RECURRENT ACUTE SUPPURATIVE OTITIS MEDIA OF RIGHT EAR WITHOUT SPONTANEOUS RUPTURE OF TYMPANIC MEMBRANE: ICD-10-CM

## 2020-01-05 PROCEDURE — G0463 HOSPITAL OUTPT CLINIC VISIT: HCPCS

## 2020-01-05 PROCEDURE — 99203 OFFICE O/P NEW LOW 30 MIN: CPT | Mod: Z6 | Performed by: NURSE PRACTITIONER

## 2020-01-05 RX ORDER — POLYMYXIN B SULFATE AND TRIMETHOPRIM 1; 10000 MG/ML; [USP'U]/ML
1-2 SOLUTION OPHTHALMIC 4 TIMES DAILY
Qty: 3 ML | Refills: 0 | Status: SHIPPED | OUTPATIENT
Start: 2020-01-05 | End: 2020-05-27

## 2020-01-05 RX ORDER — CEFDINIR 300 MG/1
300 CAPSULE ORAL 2 TIMES DAILY
Qty: 14 CAPSULE | Refills: 0 | Status: SHIPPED | OUTPATIENT
Start: 2020-01-05 | End: 2020-01-23

## 2020-01-05 NOTE — ED TRIAGE NOTES
"Pt presents today with c/o sore throat and right ear pain. Started 3 days ago. Sweats. States \"I had fevers\", \"I don't actual;ly know how high, I was just hot feeling\".  Tried OTC ear drops and mucinex, no help.   "

## 2020-01-05 NOTE — ED AVS SNAPSHOT
[facility]  [address]  [place] [address]  Phone:  [phone]                                    Ria Warren   MRN: [id number]    Department:  [facility]   Date of Visit:  1/5/2020           After Visit Summary Signature Page    I have received my discharge instructions, and my questions have been answered. I have discussed any challenges I see with this plan with the nurse or doctor.    ..........................................................................................................................................  Patient/Patient Representative Signature      ..........................................................................................................................................  Patient Representative Print Name and Relationship to Patient    ..................................................               ................................................  Date                                   Time    ..........................................................................................................................................  Reviewed by Signature/Title    ...................................................              ..............................................  Date                                               Time          22EPIC Rev 08/18        MEDICAL ONCOLOGY PROGRESS NOTE     REASON FOR INITIAL CONSULTATION:   Breast cancer.     DIAGNOSES:   1. History of early stage left breast cancer, previously treated.   2. Oncologic followup.     INTERVAL HISTORY:     Since I last saw Kate petty 14 mos ago she has felt generally well.    She RTC today for re-eval in surveillance breast cancer F/U.    No new changes on SBE.    Last surveillance mammo done Dec 2017 and no dz identified.     PAST MEDICAL HISTORY, PAST SURGICAL HISTORY, MEDICATIONS, ALLERGIES, FAMILY HISTORY AND SOCIAL HISTORY AND REVIEW OF SYSTEMS:   As detailed in the electronic health record with interval updates provided by clinic nursing support staff. Reviewed and agree.   No anxiety.    PHYSICAL EXAMINATION:   Oncology Encounter Vitals [08/20/18 1608]   ONC OP Encounter Vitals Group      /73      Pulse 79      Resp 16      Temp 98.5 °F (36.9 °C)      Temp src Oral      SpO2 97 %      Weight 150 lb 3.2 oz (68.1 kg)      Height       Pain Score 1-2      Pain Location       Pain Education?       BSA (Calculated - m2) - Reta & Reta       BMI (Calculated)      ECOG Performance Status   0 - Fully active, able to carry on all predisease activities without restrictions.     CONSTITUTIONAL: Alert, cooperative, oriented. Mood and affect appropriate. Appears close to chronological age. Well nourished. Well developed. PS 0-1.   HEAD: Normocephalic; no scars.   EYES: Conjunctivae and sclerae are clear and without icterus.  ENMT: Oropharynx clear. Tongue normal.   NECK: No jugular venous distension.   HEMATOLOGIC/LYMPHATIC: No petechiae or purpura.   CARDIORESPIRATORY: Lungs are clear to gross auscultation without rhonchi or wheezing.   CHEST: Chest is symmetric, without chest wall deformities. No palpable breast nor chest wall lesions and no axillary adenopathy on B manual exam.  ABDOMEN: Non-distended. NABS. Non-tender. No OM.   BACK/SPINE: No kyphosis, scoliosis, compression fractures.    MUSCULOSKELETAL: No tenderness or swelling, normal range of motion without obvious weakness.   EXTREMITIES: No visible deformities, no cyanosis, clubbing or edema.    INTEGUMENTARY: No rashes, scars, or lesions suggestive of malignancy.   NEUROLOGIC: No sensory or motor deficits, normal cerebellar function, normal gait, cranial nerves intact.   PSYCHIATRIC: Alert and oriented times three. Coherent speech. Verbalizes understanding of our discussions today.     Mammo December 2017 reviewed.    IMPRESSION AND PLAN:   1. H/O L breast cancer. No evidence of disease and no evidence of recurrence based on updated exam and labs. B mammo ordered for Dec 2018. RTC for clinical surveillance re-eval in 12 mos again.     2. H/O resected meningioma. Following with Dr. Martins.   3. Sz disorder. On anti-convulsant mgmnt per Neuro-Onc.    Approximately 15 minutes were spent reviewing the patient's records, interviewing and evaluating her, and providing recommendations and care. Greater than 50% of this time was spent in face-to-face counseling of the patient regarding her oncologic history, her updated evaluation and next recommendations.     Marko Vieyra MD

## 2020-01-05 NOTE — DISCHARGE INSTRUCTIONS
(J06.9,  B97.89) Viral URI with cough  Comment: Acute, symptomatic  Plan: Symptoms should continue to improve.  -- Symptomatic treatments recommended.  -Discussed that antibiotics would not help symptoms of viral URI. Education provided on symptoms of secondary bacterial infection such as new fever, chills, rigors, shortness of breath, increased work of breathing, that can occur with viral URI and need for further evaluation, if they occur.   - Ensure you are staying hydrated by drinking plenty of fluids or eating foods such as popsicles, jello, pudding.  - Honey can be soothing for sore throat  - Warm salt water gurgles can help soothe sore throat  - Rest  - Humidifier can help with congestion and help keep mucus membranes such as throat and nose from drying out.  - Sleeping slightly propped up can help with congestion and postnasal drainage that can worsen cough at bedtime.  - As long as you have never been told to take Tylenol and/or Ibuprofen you can use them to manage fever and body aches per package instructions  Make sure you eat when you take ibuprofen to avoid stomach upset.  - OTC cough medications per package instructions to help with cough. Check to see if the cough/cold medication already has acetaminophen (Tylenol) in it. If it does avoid taking additional Tylenol.  - If sudden onset of new fever, worsening symptoms return for further evaluation.  - OTC nasal steroid such as Flonase can help decrease sinus inflammation to help with congestion.  - Education provided on symptoms of post-viral bacterial infections including ear infection and pneumonia. This would require re-evaluation for treatment.        (H66.001) Non-recurrent acute suppurative otitis media of right ear without spontaneous rupture of tympanic membrane  Comment: Acute, symptomatic  Plan: Start omnicef as directed. Tylenol and/or ibuprofen for discomfort.    - Take entire course of antibiotic even if you start to feel better.  -  Antibiotics can cause stomach upset including nausea and diarrhea. Read your bottle or ask the pharmacist if antibiotic can be taken with food to help prevent nausea. If you have symptoms of diarrhea you can take an over-the-counter probiotic and/or increase foods with probiotics such as yogurt, [place], sauerkraut.      (H10.33) Acute conjunctivitis of both eyes, unspecified acute conjunctivitis type  Comment: Acute, symptomatic  Plan: This very well is likely viral from upper respiratory infection. You can try eye drops but if you do not noticed improvement in 2-3 days it is not because drops are not working but because antibiotics will not treat viral infections.    RETURN TO ED WITH NEW OR WORSENING SYMPTOMS.    Follow-up with primary care provider in 7-10 days.        Neetu Redd, CNP

## 2020-01-05 NOTE — ED PROVIDER NOTES
"  History     Chief Complaint   Patient presents with     Otalgia     R sided and sore throat x1 week.      HPI  Ria Warren is a 69 year old female who presents ambulatory with concerns of pharyngitis, right ear pain, cough, feverish for the last 3 days. First symptom - laryngitis on Tuesday, Wednesday ear pain started. She has tried OTC ear drops and Mucinex without relief of symptoms. Her most bothersome symptoms is the ear pain and sore throat. Ear pain is worsening. Throat pain is staying the same. She also had yellow drainage to both eyes this morning making it difficult for her to open eyes. She denies eye pain, swelling, or redness.    Allergies:  Allergies   Allergen Reactions     Azithromycin      Other reaction(s): Vomiting     Penicillins      Other reaction(s): *Unknown  Any \"cillins\" Skin burn from inside out /told to not use         Problem List:    Patient Active Problem List    Diagnosis Date Noted     Special screening for malignant neoplasms, colon 05/16/2017     Priority: Medium     Irritable bowel syndrome 04/27/2017     Priority: Medium     Seasonal allergic rhinitis 04/27/2017     Priority: Medium     Multiple thyroid nodules 04/06/2015     Priority: Medium     Overview:   Seen by Endocrine 4/15, recommend annual exam and ultrasound       KIYA (generalized anxiety disorder) 03/05/2015     Priority: Medium     Atrophic vaginitis 05/03/2012     Priority: Medium        Past Medical History:    Past Medical History:   Diagnosis Date     Asymptomatic menopausal state      Bitten by cat      Encounter for full-term uncomplicated delivery      Encounter for screening for malignant neoplasm of colon      Generalized anxiety disorder      Low back pain      Lower abdominal pain      Nontoxic goiter      Other chest pain        Past Surgical History:    Past Surgical History:   Procedure Laterality Date     COLONOSCOPY      9/13/2012     COLONOSCOPY  05/17/2017 05/17/2017,follow up 2022 " tubular adenoma     OTHER SURGICAL HISTORY      71767.0,PAST SURGICAL HISTORY,2004 Hospitalized for cat bite~1990 Lymph node resection x 3 at left axilla for benign disease~Notes prior complications from anesthesia:     TONSILLECTOMY      No Comments Provided       Family History:    Family History   Problem Relation Age of Onset     Breast Cancer Mother         Cancer-breast,50's     Colon Cancer Maternal Grandfather         Cancer-colon,Colon     Diabetes Maternal Grandfather         Diabetes     Heart Failure Maternal Grandmother         Heart failure     Other - See Comments Other         Maternal side heart disease     Thyroid Disease Other         Thyroid Disease,multiple women with goiters     Blood Disease No family hx of         Blood Disease,no blood clotting disorders     Ovarian Cancer No family hx of         Cancer-ovarian       Social History:  Marital Status:   [2]  Social History     Tobacco Use     Smoking status: Never Smoker     Smokeless tobacco: Never Used   Substance Use Topics     Alcohol use: Yes     Alcohol/week: 1.0 standard drinks     Types: 1 Glasses of wine per week     Drug use: Never     Types: Other     Comment: Drug use: No        Medications:    cefdinir (OMNICEF) 300 MG capsule  trimethoprim-polymyxin b (POLYTRIM) 00785-5.1 UNIT/ML-% ophthalmic solution          Review of Systems   Constitutional: Positive for chills and fever.   HENT: Positive for ear pain, rhinorrhea and sore throat. Negative for sinus pressure and sinus pain.    Eyes: Positive for discharge. Negative for photophobia, pain, redness and itching.   Respiratory: Positive for cough. Negative for shortness of breath.    Cardiovascular: Negative for chest pain.   Gastrointestinal: Negative for abdominal pain, diarrhea and vomiting.   Skin: Negative for rash.       Physical Exam   BP: 131/84  Pulse: 80  Temp: 99  F (37.2  C)  Resp: 14  SpO2: 95 %      Physical Exam  Constitutional:       General: She is not in  acute distress.     Appearance: She is ill-appearing. She is not toxic-appearing or diaphoretic.   HENT:      Head: Normocephalic and atraumatic.      Right Ear: Ear canal and external ear normal. Tympanic membrane is erythematous (dull) and bulging.      Left Ear: Ear canal and external ear normal. A middle ear effusion (clear fluid) is present. Tympanic membrane is not erythematous (TM is translucent, positive light reflex).      Nose: Congestion present.      Mouth/Throat:      Lips: Pink.      Mouth: Mucous membranes are moist.      Pharynx: Oropharynx is clear. Uvula midline. No pharyngeal swelling, oropharyngeal exudate or posterior oropharyngeal erythema.   Eyes:      General: Lids are normal.      Extraocular Movements: Extraocular movements intact.      Conjunctiva/sclera: Conjunctivae normal.      Pupils: Pupils are equal, round, and reactive to light.   Neck:      Musculoskeletal: Neck supple.   Cardiovascular:      Rate and Rhythm: Normal rate and regular rhythm.      Heart sounds: S1 normal and S2 normal. No murmur. No friction rub. No gallop.    Pulmonary:      Effort: Pulmonary effort is normal.      Breath sounds: Normal breath sounds. No wheezing, rhonchi or rales.   Lymphadenopathy:      Cervical: No cervical adenopathy.   Skin:     General: Skin is warm and dry.      Capillary Refill: Capillary refill takes less than 2 seconds.      Coloration: Skin is not pale.   Neurological:      Mental Status: She is alert and oriented to person, place, and time.      Gait: Gait is intact.   Psychiatric:         Mood and Affect: Mood normal.         Speech: Speech normal.         Behavior: Behavior normal. Behavior is cooperative.         ED Course        Procedures               No results found for this or any previous visit (from the past 24 hour(s)).    Medications - No data to display    Assessments & Plan (with Medical Decision Making)     I have reviewed the nursing notes.    I have reviewed the  findings, diagnosis, plan and need for follow up with the patient.  (J06.9,  B97.89) Viral URI with cough  Comment: Acute, symptomatic  Plan: Symptoms should continue to improve.  -- Symptomatic treatments recommended.  -Discussed that antibiotics would not help symptoms of viral URI. Education provided on symptoms of secondary bacterial infection such as new fever, chills, rigors, shortness of breath, increased work of breathing, that can occur with viral URI and need for further evaluation, if they occur.   - Ensure you are staying hydrated by drinking plenty of fluids or eating foods such as popsicles, jello, pudding.  - Honey can be soothing for sore throat  - Warm salt water gurgles can help soothe sore throat  - Rest  - Humidifier can help with congestion and help keep mucus membranes such as throat and nose from drying out.  - Sleeping slightly propped up can help with congestion and postnasal drainage that can worsen cough at bedtime.  - As long as you have never been told to take Tylenol and/or Ibuprofen you can use them to manage fever and body aches per package instructions  Make sure you eat when you take ibuprofen to avoid stomach upset.  - OTC cough medications per package instructions to help with cough. Check to see if the cough/cold medication already has acetaminophen (Tylenol) in it. If it does avoid taking additional Tylenol.  - If sudden onset of new fever, worsening symptoms return for further evaluation.  - OTC nasal steroid such as Flonase can help decrease sinus inflammation to help with congestion.  - Education provided on symptoms of post-viral bacterial infections including ear infection and pneumonia. This would require re-evaluation for treatment.        (H66.001) Non-recurrent acute suppurative otitis media of right ear without spontaneous rupture of tympanic membrane  Comment: Acute, symptomatic  Plan: Start omnicef as directed. Tylenol and/or ibuprofen for discomfort.    - Take  entire course of antibiotic even if you start to feel better.  - Antibiotics can cause stomach upset including nausea and diarrhea. Read your bottle or ask the pharmacist if antibiotic can be taken with food to help prevent nausea. If you have symptoms of diarrhea you can take an over-the-counter probiotic and/or increase foods with probiotics such as yogurt, Tony, sauerkraut.      (H10.33) Acute conjunctivitis of both eyes, unspecified acute conjunctivitis type  Comment: Acute, symptomatic  Plan: This very well is likely viral from upper respiratory infection. You can try eye drops but if you do not noticed improvement in 2-3 days it is not because drops are not working but because antibiotics will not treat viral infections.    RETURN TO ED WITH NEW OR WORSENING SYMPTOMS.    Follow-up with primary care provider in 7-10 days.    Discharge Medication List as of 1/5/2020  2:08 PM      START taking these medications    Details   cefdinir (OMNICEF) 300 MG capsule Take 1 capsule (300 mg) by mouth 2 times daily for 7 days, Disp-14 capsule, R-0, E-Prescribe      trimethoprim-polymyxin b (POLYTRIM) 50912-0.1 UNIT/ML-% ophthalmic solution Place 1-2 drops into both eyes 4 times daily for 7 days, Disp-3 mL, R-0, E-Prescribe             Final diagnoses:   Viral URI with cough   Non-recurrent acute suppurative otitis media of right ear without spontaneous rupture of tympanic membrane   Acute conjunctivitis of both eyes, unspecified acute conjunctivitis type     Neetu Redd CNP   1/5/2020   [facility]     Neetu Redd CNP  01/08/20 1240

## 2020-01-08 ASSESSMENT — ENCOUNTER SYMPTOMS
EYE REDNESS: 0
EYE DISCHARGE: 1
EYE ITCHING: 0
VOMITING: 0
FEVER: 1
ABDOMINAL PAIN: 0
SHORTNESS OF BREATH: 0
SORE THROAT: 1
RHINORRHEA: 1
SINUS PRESSURE: 0
DIARRHEA: 0
EYE PAIN: 0
COUGH: 1
CHILLS: 1
PHOTOPHOBIA: 0
SINUS PAIN: 0

## 2020-01-23 ENCOUNTER — OFFICE VISIT (OUTPATIENT)
Dept: PEDIATRICS | Facility: OTHER | Age: 70
End: 2020-01-23
Attending: INTERNAL MEDICINE
Payer: MEDICARE

## 2020-01-23 VITALS
WEIGHT: 151.6 LBS | DIASTOLIC BLOOD PRESSURE: 80 MMHG | RESPIRATION RATE: 20 BRPM | OXYGEN SATURATION: 98 % | HEART RATE: 68 BPM | SYSTOLIC BLOOD PRESSURE: 122 MMHG | BODY MASS INDEX: 27.9 KG/M2 | HEIGHT: 62 IN | TEMPERATURE: 97.7 F

## 2020-01-23 DIAGNOSIS — R05.9 COUGH: ICD-10-CM

## 2020-01-23 DIAGNOSIS — H65.91 OME (OTITIS MEDIA WITH EFFUSION), RIGHT: ICD-10-CM

## 2020-01-23 DIAGNOSIS — J32.9 CHRONIC SINUSITIS, UNSPECIFIED LOCATION: ICD-10-CM

## 2020-01-23 DIAGNOSIS — J20.8 ACUTE VIRAL BRONCHITIS: Primary | ICD-10-CM

## 2020-01-23 PROCEDURE — 99213 OFFICE O/P EST LOW 20 MIN: CPT | Performed by: INTERNAL MEDICINE

## 2020-01-23 PROCEDURE — G0463 HOSPITAL OUTPT CLINIC VISIT: HCPCS

## 2020-01-23 RX ORDER — INHALER, ASSIST DEVICES
SPACER (EA) MISCELLANEOUS
Qty: 1 EACH | Refills: 1 | Status: SHIPPED | OUTPATIENT
Start: 2020-01-23 | End: 2021-06-28

## 2020-01-23 RX ORDER — ALBUTEROL SULFATE 90 UG/1
2 AEROSOL, METERED RESPIRATORY (INHALATION) EVERY 6 HOURS PRN
Qty: 1 INHALER | Refills: 3 | Status: SHIPPED | OUTPATIENT
Start: 2020-01-23 | End: 2021-06-28

## 2020-01-23 ASSESSMENT — PAIN SCALES - GENERAL: PAINLEVEL: NO PAIN (0)

## 2020-01-23 ASSESSMENT — MIFFLIN-ST. JEOR: SCORE: 1165.9

## 2020-01-23 NOTE — PROGRESS NOTES
Subjective  Ria Warren is a 69 year old female who presents for possible ear infection.  She is been sick all of December, symptoms come and go.  Over the last 3 weeks she feels like she got worse again.  She went to the urgent care in [place] and was given an antibiotic.  She feels like she has water on her ears.  She is had a sore throat with cough.  She felt warm.  She gets chest pain when she coughs.  She brings up a slight amount of phlegm in the morning.  No heartburn.  No wheezing.    Problem List/PMH: reviewed in EMR, and made relevant updates today.  Medications: reviewed in EMR, and made relevant updates today.  Allergies: reviewed in EMR, and made relevant updates today.    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    Granddaughter with Down's and congenital heart disease.  ,  Jones. Wants to renovate the family barn near [place]. Animal lover.     I reviewed social history and made relevant updates today.    Family Hx:   Family History   Problem Relation Age of Onset     Breast Cancer Mother         Cancer-breast,50's     Colon Cancer Maternal Grandfather         Cancer-colon,Colon     Diabetes Maternal Grandfather         Diabetes     Heart Failure Maternal Grandmother         Heart failure     Other - See Comments Other         Maternal side heart disease     Thyroid Disease Other         Thyroid Disease,multiple women with goiters     Asthma Father      Asthma Paternal Uncle      Allergies Daughter      Blood Disease No family hx of         Blood Disease,no blood clotting disorders     Ovarian Cancer No family hx of         Cancer-ovarian  "      Objective  Vitals: reviewed in EMR.  /80 (BP Location: Right arm, Patient Position: Sitting, Cuff Size: Adult Large)   Pulse 68   Temp 97.7  F (36.5  C) (Tympanic)   Resp 20   Ht 1.575 m (5' 2\")   Wt 68.8 kg (151 lb 9.6 oz)   LMP  (LMP Unknown)   SpO2 98%   Breastfeeding No   BMI 27.73 kg/m      Gen: Pleasant female, NAD.  HEENT: MMM, no OP erythema.   Neck: Supple, no JVD, no bruits.  CV: RRR no m/r/g.   Pulm: CTAB no w/r/r  Neuro: Grossly intact  Msk: No lower extremity edema.  Skin: No concerning lesions.  Psychiatric: Normal affect and insight. Does not appear anxious or depressed.    Assessment    ICD-10-CM    1. Acute viral bronchitis J20.8    2. Chronic sinusitis, unspecified location J32.9    3. Cough R05 albuterol (PROAIR HFA/PROVENTIL HFA/VENTOLIN HFA) 108 (90 Base) MCG/ACT inhaler     Respiratory Therapy Supplies (VORTEX HOLDING CHAMBER/MASK) LENCHO   4. OME (otitis media with effusion), right H65.91      Overall I think her symptoms are from chronic sinusitis.  Nasal saline irrigation is recommended.  There could be a component of asthma given the persisting cough.  We discussed options and decided on a trial of albuterol.  Given the recent body aches and tactile fever she may have a current viral bronchitis.  Follow-up recommended if symptoms persist or worsen.    Plan   -- Expected clinical course discussed   -- Medications and their side effects discussed  Patient Instructions    -- Try nasal saline spray and/or Neti pot (with distilled water), 2 packets to one pot   -- Salt water gargle a few times per day for sore throat   -- Elevate head of bed to facilitate sinus drainage   -- Consider getting a HEPA filter   -- Use a cool mist humidifier during the dry season, clean weekly with vinegar   -- Drink warm liquids (eg apple juice, tea, chicken soup)   -- Look for benzocaine sore throat drops   -- Honey mixed with hot water or tea for cough   -- Over-the-counter cough/cold " medications not recommended   -- Okay to use acetaminophen (Tylenol) and ibuprofen (Advil)   -- Watch for dehydration, try to stay hydrated   -- If symptoms are not improving over 7-10 days, or worse at any point return for evaluation.     -- Trial of albuterol   -- Consider pulmonary function testing            Return if symptoms worsen or fail to improve.    Eduardo Chacon MD, FAAP, FACP  Internal Medicine & Pediatrics

## 2020-01-23 NOTE — PATIENT INSTRUCTIONS
-- Try nasal saline spray and/or Neti pot (with distilled water), 2 packets to one pot   -- Salt water gargle a few times per day for sore throat   -- Elevate head of bed to facilitate sinus drainage   -- Consider getting a HEPA filter   -- Use a cool mist humidifier during the dry season, clean weekly with vinegar   -- Drink warm liquids (eg apple juice, tea, chicken soup)   -- Look for benzocaine sore throat drops   -- Honey mixed with hot water or tea for cough   -- Over-the-counter cough/cold medications not recommended   -- Okay to use acetaminophen (Tylenol) and ibuprofen (Advil)   -- Watch for dehydration, try to stay hydrated   -- If symptoms are not improving over 7-10 days, or worse at any point return for evaluation.     -- Trial of albuterol   -- Consider pulmonary function testing

## 2020-01-23 NOTE — NURSING NOTE
Patient presents to clinic for ear pain and cough that has become worse over the last few days.  Alanna Araujo LPN ....................  1/23/2020   2:31 PM    No LMP recorded (lmp unknown). Patient is postmenopausal.  Medication Reconciliation: complete    Alanna Araujo LPN  1/23/2020 2:31 PM

## 2020-05-27 ENCOUNTER — OFFICE VISIT (OUTPATIENT)
Dept: FAMILY MEDICINE | Facility: OTHER | Age: 70
End: 2020-05-27
Attending: PHYSICIAN ASSISTANT
Payer: MEDICARE

## 2020-05-27 VITALS
HEART RATE: 72 BPM | TEMPERATURE: 98.2 F | SYSTOLIC BLOOD PRESSURE: 98 MMHG | RESPIRATION RATE: 20 BRPM | WEIGHT: 150.8 LBS | DIASTOLIC BLOOD PRESSURE: 60 MMHG | BODY MASS INDEX: 27.58 KG/M2
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
## 2020-05-27 PROCEDURE — 99213 OFFICE O/P EST LOW 20 MIN: CPT | Performed by: PHYSICIAN ASSISTANT

## 2020-05-27 PROCEDURE — G0463 HOSPITAL OUTPT CLINIC VISIT: HCPCS

## 2020-05-27 RX ORDER — DOXYCYCLINE HYCLATE 100 MG
100 TABLET ORAL ONCE
Qty: 4 TABLET | Refills: 1 | Status: SHIPPED | OUTPATIENT
Start: 2020-05-27 | End: 2020-05-27

## 2020-05-27 RX ORDER — DOXYCYCLINE HYCLATE 100 MG
100 TABLET ORAL ONCE
Qty: 4 TABLET | Refills: 1 | Status: SHIPPED | OUTPATIENT
Start: 2020-05-27 | End: 2020-06-08

## 2020-05-27 NOTE — PROGRESS NOTES
Nursing Notes:   Basilia Kirk LPN  2020  2:15 PM  Signed  Chief Complaint   Patient presents with     Tick Bite     right thigh         Medication Reconciliation: complete    Basilia Kirk LPN      HPI:    Ria Warren is a 69 year old female who presents for a tick bite. Found tick this morning.  Sleeps with her dog.  Does not use take medicine on the dog however she does search thoroughly for ticks prior to going to bed.  Red area around  the tick bite.  Otherwise feeling fine.  No fevers, chills, chest pain, palpitations, problems breathing, body aches, muscle aches, fatigue.  Tried to pull it out however she was unable to remove it.    Past Medical History:   Diagnosis Date     Asymptomatic menopausal state     No Comments Provided     Bitten by cat     ,Hospitalized     Encounter for full-term uncomplicated delivery      2, Para 1with 1 spontaneous      Encounter for screening for malignant neoplasm of colon     No Comments Provided     Generalized anxiety disorder     No Comments Provided     Low back pain     10/10/2006,Right. Under investigation     Lower abdominal pain     10/10/2006,mid to lower quadrant. Under investigation     Nontoxic goiter     No Comments Provided     Other chest pain     No Comments Provided       Past Surgical History:   Procedure Laterality Date     COLONOSCOPY      2012     COLONOSCOPY  2017,follow up  tubular adenoma     OTHER SURGICAL HISTORY      01885.0,PAST SURGICAL HISTORY, Hospitalized for cat bite~ Lymph node resection x 3 at left axilla for benign disease~Notes prior complications from anesthesia:     TONSILLECTOMY      No Comments Provided       Family History   Problem Relation Age of Onset     Breast Cancer Mother         Cancer-breast,50's     Colon Cancer Maternal Grandfather         Cancer-colon,Colon     Diabetes Maternal Grandfather         Diabetes     Heart Failure  "Maternal Grandmother         Heart failure     Other - See Comments Other         Maternal side heart disease     Thyroid Disease Other         Thyroid Disease,multiple women with goiters     Asthma Father      Asthma Paternal Uncle      Allergies Daughter      Blood Disease No family hx of         Blood Disease,no blood clotting disorders     Ovarian Cancer No family hx of         Cancer-ovarian       Social History     Tobacco Use     Smoking status: Never Smoker     Smokeless tobacco: Never Used   Substance Use Topics     Alcohol use: Yes     Alcohol/week: 1.0 standard drinks     Types: 1 Glasses of wine per week       Current Outpatient Medications   Medication Sig Dispense Refill     albuterol (PROAIR HFA/PROVENTIL HFA/VENTOLIN HFA) 108 (90 Base) MCG/ACT inhaler Inhale 2 puffs into the lungs every 6 hours as needed for shortness of breath / dyspnea or wheezing 1 Inhaler 3     doxycycline hyclate (VIBRA-TABS) 100 MG tablet Take 1 tablet (100 mg) by mouth once for 1 dose For each tick bite 4 tablet 1     Respiratory Therapy Supplies (VORTEX HOLDING CHAMBER/MASK) LENCHO Use with HFA 1 each 1       Allergies   Allergen Reactions     Azithromycin      Other reaction(s): Vomiting     Penicillins      Other reaction(s): *Unknown  Any \"cillins\" Skin burn from inside out /told to not use         REVIEW OF SYSTEMS:  Refer to HPI.    EXAM:   Vitals:    BP 98/60 (BP Location: Right arm, Patient Position: Sitting, Cuff Size: Adult Regular)   Pulse 72   Temp 98.2  F (36.8  C)   Resp 20   Wt 68.4 kg (150 lb 12.8 oz)   LMP  (LMP Unknown)   BMI 27.58 kg/m      General Appearance: Pleasant, alert, appropriate appearance for age. No acute distress  Chest/Respiratory Exam: Normal chest wall and respirations. Clear to auscultation.  Cardiovascular Exam: Regular rate and rhythm. S1, S2, no murmur, click, gallop, or rubs.  Skin: Tick bite appreciated on right proximal anterior thigh.  Using a small forceps and an 11 blade removed " the rest of the tick parts that were appreciated.  Patient tolerated procedure well.  No complications were appreciated.  Psychiatric Exam: Alert and oriented - appropriate affect.    PHQ Depression Screen  PHQ-9 SCORE 6/30/2016 8/3/2018 12/3/2019   PHQ-9 Total Score 1 0 0       ASSESSMENT AND PLAN:      ICD-10-CM    1. Tick bite, initial encounter  W57.XXXA doxycycline hyclate (VIBRA-TABS) 100 MG tablet     DISCONTINUED: doxycycline hyclate (VIBRA-TABS) 100 MG tablet         Patient was given a one-time dose of doxycycline for tickborne illness prevention.    Monitor for fevers, chills, fatigue, bulls eye lesion, increased or spreading redness around the bite dyllan, body aches or pains, joint pain, swelling, stomach concerns, pus, drainage, heart problems such as a slowed heart rate, headache, stiff neck, feeling of pain, weakness or numbness. Return to clinic with change/worsening of symptoms. Gave warning signs/symptoms. Gave tick prophylaxis treatment.    Discussed symptoms of lymes and instructed to be seen and evaluated if they develop. Encouraged prevention with use of mosquito spray with DEET and checking for ticks nightly. Instructed to return if not improving.       Patient Instructions   Monitor for fevers, chills, fatigue, bulls eye lesion, increased or spreading redness around the bite dyllan, body aches or pains, joint pain, swelling, stomach concerns, pus, drainage, heart problems such as a slowed heart rate, headache, stiff neck, feeling of pain, weakness or numbness. Return to clinic with change/worsening of symptoms. Gave warning signs/symptoms. Gave tick prophylaxis treatment.    Discussed symptoms of lymes and instructed to be seen and evaluated if they develop. Encouraged prevention with use of mosquito spray with DEET and checking for ticks nightly. Instructed to return if not improving.          Chayo Santos PA-C PA-C..................5/27/2020 2:04 PM

## 2020-05-27 NOTE — NURSING NOTE
Chief Complaint   Patient presents with     Tick Bite     right thigh         Medication Reconciliation: complete    Basilia Kirk, LPN

## 2020-05-27 NOTE — PATIENT INSTRUCTIONS
Monitor for fevers, chills, fatigue, bulls eye lesion, increased or spreading redness around the bite dyllan, body aches or pains, joint pain, swelling, stomach concerns, pus, drainage, heart problems such as a slowed heart rate, headache, stiff neck, feeling of pain, weakness or numbness. Return to clinic with change/worsening of symptoms. Gave warning signs/symptoms. Gave tick prophylaxis treatment.    Discussed symptoms of lymes and instructed to be seen and evaluated if they develop. Encouraged prevention with use of mosquito spray with DEET and checking for ticks nightly. Instructed to return if not improving.

## 2020-06-08 ENCOUNTER — OFFICE VISIT (OUTPATIENT)
Dept: FAMILY MEDICINE | Facility: OTHER | Age: 70
End: 2020-06-08
Attending: FAMILY MEDICINE
Payer: MEDICARE

## 2020-06-08 VITALS
HEART RATE: 67 BPM | WEIGHT: 151 LBS | DIASTOLIC BLOOD PRESSURE: 84 MMHG | TEMPERATURE: 96.9 F | OXYGEN SATURATION: 99 % | RESPIRATION RATE: 16 BRPM | BODY MASS INDEX: 27.62 KG/M2 | SYSTOLIC BLOOD PRESSURE: 136 MMHG

## 2020-06-08 DIAGNOSIS — M47.894 THORACIC FACET JOINT SYNDROME: Primary | ICD-10-CM

## 2020-06-08 PROCEDURE — 99213 OFFICE O/P EST LOW 20 MIN: CPT | Performed by: FAMILY MEDICINE

## 2020-06-08 PROCEDURE — G0463 HOSPITAL OUTPT CLINIC VISIT: HCPCS

## 2020-06-08 PROCEDURE — G0463 HOSPITAL OUTPT CLINIC VISIT: HCPCS | Mod: 25

## 2020-06-08 ASSESSMENT — PAIN SCALES - GENERAL: PAINLEVEL: NO PAIN (0)

## 2020-06-08 NOTE — NURSING NOTE
"Chief Complaint   Patient presents with     Chest Pain     burning on right side of chest this morning   She woke up this morning with a burning pain in right side of her chest. She states the pain goes into her back and a little down her right arm.  Marlen Noonan LPN..................6/8/2020   2:17 PM      Initial /84   Pulse 67   Temp 96.9  F (36.1  C) (Temporal)   Resp 16   Wt 68.5 kg (151 lb)   LMP  (LMP Unknown)   SpO2 99%   Breastfeeding No   BMI 27.62 kg/m   Estimated body mass index is 27.62 kg/m  as calculated from the following:    Height as of 1/23/20: 1.575 m (5' 2\").    Weight as of this encounter: 68.5 kg (151 lb).  Medication Reconciliation: complete    Marlen Noonan LPN  "

## 2020-06-08 NOTE — PROGRESS NOTES
"Nursing Notes:   Marlen Noonan LPN  6/8/2020  2:17 PM  Signed  Chief Complaint   Patient presents with     Chest Pain     burning on right side of chest this morning   She woke up this morning with a burning pain in right side of her chest. She states the pain goes into her back and a little down her right arm.  Marlen Noonan LPN..................6/8/2020   2:17 PM      Initial /84   Pulse 67   Temp 96.9  F (36.1  C) (Temporal)   Resp 16   Wt 68.5 kg (151 lb)   LMP  (LMP Unknown)   SpO2 99%   Breastfeeding No   BMI 27.62 kg/m   Estimated body mass index is 27.62 kg/m  as calculated from the following:    Height as of 1/23/20: 1.575 m (5' 2\").    Weight as of this encounter: 68.5 kg (151 lb).  Medication Reconciliation: complete    Marlen Noonan LPN     SUBJECTIVE:  Ria Warren  is a 69 year old female who comes in today because of burning on the right side of her chest.  It seems to go down into her back and a little bit down her right arm.  It started this morning.  She noted an achy burn from her right anterior chest back to her back. 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  She put the leaves in tall brown bags.      She feels tired.  She is not sure if she might have just overdone it.     She had some left-sided chest pain and December for which we have x-ray imaging was done and did not show any significant abnormality.  She has a history of costochondritis in 2018 in the summer when seen by Dr. Nova.    She has no heart issues that she is aware of. She has some heart disease on her father's side. She has never smoked. She has no hypertension.  There is some congenital heart disease in the family.     She was seen 2 weeks ago with an embedded tick which was removed in the office and she was given doxycycline for tickborne illness prevention.    Past Medical, Family, and Social History reviewed and updated as noted below.   ROS is negative " "except as noted above       Allergies   Allergen Reactions     Azithromycin      Other reaction(s): Vomiting     Penicillins      Other reaction(s): *Unknown  Any \"cillins\" Skin burn from inside out /told to not use     ,   Family History   Problem Relation Age of Onset     Breast Cancer Mother         Cancer-breast,50's     Colon Cancer Maternal Grandfather         Cancer-colon,Colon     Diabetes Maternal Grandfather         Diabetes     Heart Failure Maternal Grandmother         Heart failure     Other - See Comments Other         Maternal side heart disease     Thyroid Disease Other         Thyroid Disease,multiple women with goiters     Asthma Father      Asthma Paternal Uncle      Allergies Daughter      Blood Disease No family hx of         Blood Disease,no blood clotting disorders     Ovarian Cancer No family hx of         Cancer-ovarian   ,   Current Outpatient Medications   Medication     Respiratory Therapy Supplies (VORTEX HOLDING CHAMBER/MASK) LENCHO     albuterol (PROAIR HFA/PROVENTIL HFA/VENTOLIN HFA) 108 (90 Base) MCG/ACT inhaler     No current facility-administered medications for this visit.    ,   Past Medical History:   Diagnosis Date     Asymptomatic menopausal state     No Comments Provided     Bitten by cat     ,Hospitalized     Encounter for full-term uncomplicated delivery      2, Para 1with 1 spontaneous      Encounter for screening for malignant neoplasm of colon     No Comments Provided     Generalized anxiety disorder     No Comments Provided     Low back pain     10/10/2006,Right. Under investigation     Lower abdominal pain     10/10/2006,mid to lower quadrant. Under investigation     Nontoxic goiter     No Comments Provided     Other chest pain     No Comments Provided   ,   Patient Active Problem List    Diagnosis Date Noted     OME (otitis media with effusion), right 2020     Priority: Medium     Chronic sinusitis, unspecified location 2020     Priority: " Medium     Special screening for malignant neoplasms, colon 05/16/2017     Priority: Medium     Irritable bowel syndrome 04/27/2017     Priority: Medium     Seasonal allergic rhinitis 04/27/2017     Priority: Medium     Multiple thyroid nodules 04/06/2015     Priority: Medium     Overview:   Seen by Endocrine 4/15, recommend annual exam and ultrasound       KIYA (generalized anxiety disorder) 03/05/2015     Priority: Medium     Atrophic vaginitis 05/03/2012     Priority: Medium   ,   Past Surgical History:   Procedure Laterality Date     COLONOSCOPY      9/13/2012     COLONOSCOPY  05/17/2017 05/17/2017,follow up 2022 tubular adenoma     OTHER SURGICAL HISTORY      05258.0,PAST SURGICAL HISTORY,2004 Hospitalized for cat bite~1990 Lymph node resection x 3 at left axilla for benign disease~Notes prior complications from anesthesia:     TONSILLECTOMY      No Comments Provided    and   Social History     Tobacco Use     Smoking status: Never Smoker     Smokeless tobacco: Never Used   Substance Use Topics     Alcohol use: Yes     Alcohol/week: 1.0 standard drinks     Types: 1 Glasses of wine per week     OBJECTIVE:  /84   Pulse 67   Temp 96.9  F (36.1  C) (Temporal)   Resp 16   Wt 68.5 kg (151 lb)   LMP  (LMP Unknown)   SpO2 99%   Breastfeeding No   BMI 27.62 kg/m     EXAM:  Alert and cooperative, no distress.  Affect is appropriate.  Neck range of motion is preserved and shoulder range of motion is normal.  She has some discomfort by the right shoulder blade with tenderness to palpation and a myofascial knot that is palpable on the right T5-6 area.  Rotational range of motion of the thoracic spine is limited on rotation to the left consistent with thoracic facet block.  Lungs are clear, no rales rhonchi or wheezes are heard.  Cardiac RRR without murmur  ASSESSMENT/Plan :    Ria was seen today for chest pain.    Diagnoses and all orders for this visit:    Thoracic facet joint syndrome  -      CHIROPRACTIC REFERRAL      Based on her history, risk factor assessment and exam, this seems clearly musculoskeletal.  I think she has locked thoracic facet that is the cause of her symptoms.  Discussed physical therapy or chiropractic referral and she would like to see Dr. Skelton of [facility].  Referral sent for same.  Discussed symptomatic treatment with ice and anti-inflammatories.  If her symptoms do not improve with conservative or chiropractic treatment, would then consider further evaluation.  Discussed what more worrisome things to watch for including diaphoresis shortness of breath exertional chest pain etc.  She will keep in touch with her progress.     A total of 15 minutes was spent with the patient, greater than 50% of the time was spent in counseling/discussion of the aforementioned concerns.     Shay Carlson MD

## 2020-07-08 ENCOUNTER — TRANSFERRED RECORDS (OUTPATIENT)
Dept: HEALTH INFORMATION MANAGEMENT | Facility: OTHER | Age: 70
End: 2020-07-08

## 2020-07-23 ENCOUNTER — TRANSFERRED RECORDS (OUTPATIENT)
Dept: HEALTH INFORMATION MANAGEMENT | Facility: CLINIC | Age: 70
End: 2020-07-23

## 2020-08-18 ENCOUNTER — TRANSFERRED RECORDS (OUTPATIENT)
Dept: HEALTH INFORMATION MANAGEMENT | Facility: OTHER | Age: 70
End: 2020-08-18

## 2020-10-30 ENCOUNTER — TRANSFERRED RECORDS (OUTPATIENT)
Dept: HEALTH INFORMATION MANAGEMENT | Facility: CLINIC | Age: 70
End: 2020-10-30

## 2021-02-04 ENCOUNTER — ALLIED HEALTH/NURSE VISIT (OUTPATIENT)
Dept: FAMILY MEDICINE | Facility: OTHER | Age: 71
End: 2021-02-04
Attending: FAMILY MEDICINE
Payer: MEDICARE

## 2021-02-04 DIAGNOSIS — R05.9 COUGH: Primary | ICD-10-CM

## 2021-02-04 PROCEDURE — U0005 INFEC AGEN DETEC AMPLI PROBE: HCPCS | Mod: ZL | Performed by: FAMILY MEDICINE

## 2021-02-04 PROCEDURE — C9803 HOPD COVID-19 SPEC COLLECT: HCPCS

## 2021-02-04 PROCEDURE — U0003 INFECTIOUS AGENT DETECTION BY NUCLEIC ACID (DNA OR RNA); SEVERE ACUTE RESPIRATORY SYNDROME CORONAVIRUS 2 (SARS-COV-2) (CORONAVIRUS DISEASE [COVID-19]), AMPLIFIED PROBE TECHNIQUE, MAKING USE OF HIGH THROUGHPUT TECHNOLOGIES AS DESCRIBED BY CMS-2020-01-R: HCPCS | Mod: ZL | Performed by: FAMILY MEDICINE

## 2021-02-05 ENCOUNTER — TELEPHONE (OUTPATIENT)
Dept: FAMILY MEDICINE | Facility: OTHER | Age: 71
End: 2021-02-05

## 2021-02-05 DIAGNOSIS — Z11.59 ENCOUNTER FOR SCREENING FOR OTHER VIRAL DISEASES: ICD-10-CM

## 2021-02-05 LAB
SARS-COV-2 RNA RESP QL NAA+PROBE: ABNORMAL
SPECIMEN SOURCE: ABNORMAL

## 2021-02-05 NOTE — TELEPHONE ENCOUNTER
"Coronavirus (COVID-19) Notification    Caller Name (Patient, parent, daughter/son, grandparent, etc)  Patient    Reason for call  Notify of Positive Coronavirus (COVID-19) lab results, assess symptoms,  review  [facility] recommendations    Lab Result    Lab test:  2019-nCoV rRt-PCR or SARS-CoV-2 PCR    Oropharyngeal AND/OR nasopharyngeal swabs is POSITIVE for 2019-nCoV RNA/SARS-COV-2 PCR (COVID-19 virus)    RN Recommendations/Instructions per [facility] Coronavirus COVID-19 recommendations    Brief introduction script  Introduce self then review script:  \"I am calling on behalf of [facility].  We were notified that your Coronavirus test (COVID-19) for was POSITIVE for the virus.  I have some information to relay to you but first I wanted to mention that the [place] Dept of Health will be contacting you shortly [it's possible MD already called Patient] to talk to you more about how you are feeling and other people you have had contact with who might now also have the virus.  Also,  [facility] is Partnering with the [facility] for Covid-19 research, you may be contacted directly by research staff.\"    Assessment (Inquire about Patient's current symptoms)   Assessment   Current Symptoms at time of phone call: (if no symptoms, document No symptoms] Body aches and loss of taste    Symptoms onset (if applicable) 5 days ago     If at time of call, Patients symptoms hare worsened, the Patient should contact 911 or have someone drive them to Emergency Dept promptly:      If Patient calling 911, inform 911 personal that you have tested positive for the Coronavirus (COVID-19).  Place mask on and await 911 to arrive.    If Emergency Dept, If possible, please have another adult drive you to the Emergency Dept but you need to wear mask when in contact with other people.      Monoclonal Antibody Administration    You may be eligible to receive a new treatment with a monoclonal antibody for preventing " "hospitalization in patients at high risk for complications from COVID-19.   This medication is still experimental and available on a limited basis; it is given through an IV and must be given at an infusion center. Please note that not all people who are eligible will receive the medication since it is in limited supply.     Are you interested in being considered for this medication?  Yes Is the patient 18 years of age or older? Yes.  Does the patient use supplemental oxygen?  No.  Patient criteria for selection: 65 years old or more  Does the patient fit the criteria: Yes: COVID-19 Monoclonal Antibody Referral completed.  Diagnosis code: COVID-19  U07.1    If patient qualifies based on above criteria:  \"We will contact you if you are selected to receive the medication in the next 1-2 days.   This is time sensitive and if you are not selected in the next 1-2 days, you will not receive the medication.  If you do not receive a call to schedule, you have not been selected.\"    Review information with Patient    Your result was positive. This means you have COVID-19 (coronavirus).  We have sent you a letter that reviews the information that I'll be reviewing with you now.    How can I protect others?    If you have symptoms: stay home and away from others (self-isolate) until:    You've had no fever--and no medicine that reduces fever--for 1 full day (24 hours). And       Your other symptoms have gotten better. For example, your cough or breathing has improved. And     At least 10 days have passed since your symptoms started. (If you've been told by a doctor that you have a weak immune system, wait 20 days.)     If you don't have symptoms: Stay home and away from others (self-isolate) until at least 10 days have passed since your first positive COVID-19 test. (Date test collected)    During this time:    Stay in your own room, including for meals. Use your own bathroom if you can.    Stay away from others in your home. " No hugging, kissing or shaking hands. No visitors.     Don't go to work, school or anywhere else.     Clean  high touch  surfaces often (doorknobs, counters, handles, etc.). Use a household cleaning spray or wipes. You'll find a full list on the EPA website at www.epa.gov/pesticide-registration/list-n-disinfectants-use-against-sars-cov-2.     Cover your mouth and nose with a mask, tissue or other face covering to avoid spreading germs.    Wash your hands and face often with soap and water.    Caregivers in these groups are at risk for severe illness due to COVID-19:  o People 65 years and older  o People who live in a nursing home or long-term care facility  o People with chronic disease (lung, heart, cancer, diabetes, kidney, liver, immunologic)  o People who have a weakened immune system, including those who:  - Are in cancer treatment  - Take medicine that weakens the immune system, such as corticosteroids  - Had a bone marrow or organ transplant  - Have an immune deficiency  - Have poorly controlled HIV or AIDS  - Are obese (body mass index of 40 or higher)  - Smoke regularly    Caregivers should wear gloves while washing dishes, handling laundry and cleaning bedrooms and bathrooms.    Wash and dry laundry with special caution. Don't shake dirty laundry, and use the warmest water setting you can.    If you have a weakened immune system, ask your doctor about other actions you should take.    For more tips, go to www.cdc.gov/coronavirus/2019-ncov/downloads/10Things.pdf.    You should not go back to work until you meet the guidelines above for ending your home isolation. You don't need to be retested for COVID-19 before going back to work--studies show that you won't spread the virus if it's been at least 10 days since your symptoms started (or 20 days, if you have a weak immune system).    Employers: This document serves as formal notice of your employee's medical guidelines for going back to work. They must meet  the above guidelines before going back to work in person.    How can I take care of myself?    1. Get lots of rest. Drink extra fluids (unless a doctor has told you not to).    2. Take Tylenol (acetaminophen) for fever or pain. If you have liver or kidney problems, ask your family doctor if it's okay to take Tylenol.     Take either:     650 mg (two 325 mg pills) every 4 to 6 hours, or     1,000 mg (two 500 mg pills) every 8 hours as needed.     Note: Don't take more than 3,000 mg in one day. Acetaminophen is found in many medicines (both prescribed and over-the-counter medicines). Read all labels to be sure you don't take too much.    For children, check the Tylenol bottle for the right dose (based on their age or weight).    3. If you have other health problems (like cancer, heart failure, an organ transplant or severe kidney disease): Call your specialty clinic if you don't feel better in the next 2 days.    4. Know when to call 911: Emergency warning signs include:    Trouble breathing or shortness of breath    Pain or pressure in the chest that doesn't go away    Feeling confused like you haven't felt before, or not being able to wake up    Bluish-colored lips or face    5. Sign up for [employer]. We know it's scary to hear that you have COVID-19. We want to track your symptoms to make sure you're okay over the next 2 weeks. Please look for an email from [employer]--this is a free, online program that we'll use to keep in touch. To sign up, follow the link in the email. Learn more at [email or web address].    Where can I get more information?    [facility]: [email or web address]    Coronavirus Basics: www.health.state.[place]/diseases/coronavirus/basics.html    What to Do If You're Sick: www.cdc.gov/coronavirus/2019-ncov/about/steps-when-sick.html    Ending Home Isolation: www.cdc.gov/coronavirus/2019-ncov/hcp/disposition-in-home-patients.html     Caring for Someone with COVID-19:  www.cdc.gov/coronavirus/2019-ncov/if-you-are-sick/care-for-someone.html     [facility] clinical trials (COVID-19 research studies): [email or web address].[email or web address]-clinical-[email or web address]     A Positive COVID-19 letter will be sent via [employer] or the mail. (Exception, no letters sent to Presurgerical/Preprocedure Patients)    Jeanette Cardoso LPN

## 2021-02-26 RX ORDER — DOXYCYCLINE HYCLATE 100 MG
TABLET ORAL
COMMUNITY
Start: 2020-05-27 | End: 2021-04-16

## 2021-02-26 NOTE — PROGRESS NOTES
"    Assessment & Plan     (Z76.89) Encounter to establish care  (primary encounter diagnosis)  Comment: appt for physical scheduled. Meds, allergies, FH, SH reviewed      (Z78.0) Menopause  Comment: schedule  Plan: DX Hip/Pelvis/Spine            (Z12.31) Encounter for screening mammogram for breast cancer  Plan: MA Screen Bilateral w/[facility]                Review of prior external note(s) from - Outside records from [facility]  30 minutes spent on the date of the encounter doing chart review, history and exam, documentation and further activities as noted above       BMI:   Estimated body mass index is 25.59 kg/m  as calculated from the following:    Height as of this encounter: 1.651 m (5' 5\").    Weight as of this encounter: 69.8 kg (153 lb 12.8 oz).           No follow-ups on file.    LUIS CARLOS Edwards  [facility]    Irasema Rollins is a 70 year old who presents for the following health issues     HPI       New Patient        Review of Systems   Constitutional, HEENT, cardiovascular, pulmonary, gi and gu systems are negative, except as otherwise noted.      Objective    /62   Pulse 79   Temp 97.1  F (36.2  C) (Tympanic)   Resp 16   Ht 1.651 m (5' 5\")   Wt 69.8 kg (153 lb 12.8 oz)   LMP  (LMP Unknown)   SpO2 98%   BMI 25.59 kg/m    Body mass index is 25.59 kg/m .  Physical Exam   GENERAL: healthy, alert and no distress                "

## 2021-03-12 ENCOUNTER — OFFICE VISIT (OUTPATIENT)
Dept: FAMILY MEDICINE | Facility: OTHER | Age: 71
End: 2021-03-12
Attending: PHYSICIAN ASSISTANT
Payer: MEDICARE

## 2021-03-12 VITALS
SYSTOLIC BLOOD PRESSURE: 122 MMHG | BODY MASS INDEX: 25.62 KG/M2 | TEMPERATURE: 97.1 F | WEIGHT: 153.8 LBS | DIASTOLIC BLOOD PRESSURE: 62 MMHG | HEIGHT: 65 IN | OXYGEN SATURATION: 98 % | RESPIRATION RATE: 16 BRPM | HEART RATE: 79 BPM

## 2021-03-12 DIAGNOSIS — Z12.31 ENCOUNTER FOR SCREENING MAMMOGRAM FOR BREAST CANCER: ICD-10-CM

## 2021-03-12 DIAGNOSIS — Z76.89 ENCOUNTER TO ESTABLISH CARE: Primary | ICD-10-CM

## 2021-03-12 DIAGNOSIS — Z78.0 MENOPAUSE: ICD-10-CM

## 2021-03-12 PROCEDURE — 99203 OFFICE O/P NEW LOW 30 MIN: CPT | Performed by: PHYSICIAN ASSISTANT

## 2021-03-12 PROCEDURE — G0463 HOSPITAL OUTPT CLINIC VISIT: HCPCS

## 2021-03-12 ASSESSMENT — ANXIETY QUESTIONNAIRES
IF YOU CHECKED OFF ANY PROBLEMS ON THIS QUESTIONNAIRE, HOW DIFFICULT HAVE THESE PROBLEMS MADE IT FOR YOU TO DO YOUR WORK, TAKE CARE OF THINGS AT HOME, OR GET ALONG WITH OTHER PEOPLE: SOMEWHAT DIFFICULT
2. NOT BEING ABLE TO STOP OR CONTROL WORRYING: SEVERAL DAYS
7. FEELING AFRAID AS IF SOMETHING AWFUL MIGHT HAPPEN: NOT AT ALL
GAD7 TOTAL SCORE: 4
3. WORRYING TOO MUCH ABOUT DIFFERENT THINGS: SEVERAL DAYS
1. FEELING NERVOUS, ANXIOUS, OR ON EDGE: SEVERAL DAYS
6. BECOMING EASILY ANNOYED OR IRRITABLE: NOT AT ALL
5. BEING SO RESTLESS THAT IT IS HARD TO SIT STILL: NOT AT ALL

## 2021-03-12 ASSESSMENT — PATIENT HEALTH QUESTIONNAIRE - PHQ9
5. POOR APPETITE OR OVEREATING: SEVERAL DAYS
SUM OF ALL RESPONSES TO PHQ QUESTIONS 1-9: 6

## 2021-03-12 ASSESSMENT — PAIN SCALES - GENERAL: PAINLEVEL: NO PAIN (0)

## 2021-03-12 ASSESSMENT — MIFFLIN-ST. JEOR: SCORE: 1218.51

## 2021-03-12 NOTE — NURSING NOTE
"Chief Complaint   Patient presents with     Establish Care       Initial /62   Pulse 79   Temp 97.1  F (36.2  C) (Tympanic)   Resp 16   Ht 1.651 m (5' 5\")   Wt 69.8 kg (153 lb 12.8 oz)   LMP  (LMP Unknown)   SpO2 98%   BMI 25.59 kg/m   Estimated body mass index is 25.59 kg/m  as calculated from the following:    Height as of this encounter: 1.651 m (5' 5\").    Weight as of this encounter: 69.8 kg (153 lb 12.8 oz).  Medication Reconciliation: complete  Maryjane Peralta LPN  "

## 2021-03-13 ASSESSMENT — ANXIETY QUESTIONNAIRES: GAD7 TOTAL SCORE: 4

## 2021-03-29 NOTE — PROGRESS NOTES
"SUBJECTIVE:   Ria Warren is a 70 year old female who presents for Preventive Visit.    Are you in the first 12 months of your Medicare Part B coverage?  No    Physical Health:    In general, how would you rate your overall physical health? good    Outside of work, how many days during the week do you exercise? 6-7 days/week    Outside of work, approximately how many minutes a day do you exercise?30-45 minutes    If you drink alcohol do you typically have >3 drinks per day or >7 drinks per week? No    Do you usually eat at least 4 servings of fruit and vegetables a day, include whole grains & fiber and avoid regularly eating high fat or \"junk\" foods? NO    Do you have any problems taking medications regularly?  YES    Do you have any side effects from medications? not applicable    Needs assistance for the following daily activities: no assistance needed    Which of the following safety concerns are present in your home?  poor lighting - its being addressed    Hearing impairment: No    In the past 6 months, have you been bothered by leaking of urine? no    Mental Health:    In general, how would you rate your overall mental or emotional health? good  PHQ-2 Score:      Do you feel safe in your environment? Yes    Have you ever done Advance Care Planning? (For example, a Health Directive, POLST, or a discussion with a medical provider or your loved ones about your wishes): Yes, patient states has an Advance Care Planning document and will bring a copy to the clinic.    Additional concerns to address?  2 week history of nasal congestion,PND, left sinus pain.    Fall risk:  Fallen 2 or more times in the past year?: No  Any fall with injury in the past year?: No    Cognitive Screenin) Repeat 3 items (Leader, Season, Table)    2) Clock draw: NORMAL  3) 3 item recall: Recalls 3 objects  Results: 3 items recalled: COGNITIVE IMPAIRMENT LESS LIKELY    Mini-CogTM Copyright S Rose. Licensed by the author for use " in [facility]; reprinted with permission (sojustine@[email or web address]). All rights reserved.      Do you have sleep apnea, excessive snoring or daytime drowsiness?: no          Reviewed and updated as needed this visit by clinical staff  Tobacco  Allergies  Meds   Med Hx  Surg Hx  Fam Hx  Soc Hx        Reviewed and updated as needed this visit by Provider                Social History     Tobacco Use     Smoking status: Never Smoker     Smokeless tobacco: Never Used   Substance Use Topics     Alcohol use: Yes     Alcohol/week: 1.0 standard drinks     Types: 1 Glasses of wine per week                           Current providers sharing in care for this patient include:   Patient Care Team:  Eduardo Nova MD as PCP - General (Pediatrics)  Amelie Gonzales PA as Assigned PCP    The following health maintenance items are reviewed in Epic and correct as of today:  Health Maintenance   Topic Date Due     DEXA  Never done     COVID-19 Vaccine (1) Never done     HEPATITIS C SCREENING  Never done     ZOSTER IMMUNIZATION (1 of 2) Never done     Pneumococcal Vaccine: 65+ Years (1 of 1 - PPSV23) Never done     MAMMO SCREENING  08/03/2020     INFLUENZA VACCINE (1) Never done     FALL RISK ASSESSMENT  03/12/2022     MEDICARE ANNUAL WELLNESS VISIT  04/16/2022     LIPID  04/27/2022     COLORECTAL CANCER SCREENING  05/17/2022     ADVANCE CARE PLANNING  08/03/2023     DTAP/TDAP/TD IMMUNIZATION (2 - Td) 03/04/2025     PHQ-2  Completed     Pneumococcal Vaccine: Pediatrics (0 to 5 Years) and At-Risk Patients (6 to 64 Years)  Aged Out     IPV IMMUNIZATION  Aged Out     MENINGITIS IMMUNIZATION  Aged Out     HEPATITIS B IMMUNIZATION  Aged Out           ROS:  Constitutional, HEENT, cardiovascular, pulmonary, gi and gu systems are negative, except as otherwise noted.    OBJECTIVE:   /66 (BP Location: Right arm, Patient Position: Chair, Cuff Size: Adult Regular)   Pulse 80   Temp 98.2  F (36.8  C) (Tympanic)   Resp 16  "  Ht 1.492 m (4' 10.75\")   Wt 70.8 kg (156 lb)   LMP  (LMP Unknown)   SpO2 98%   BMI 31.78 kg/m   Estimated body mass index is 31.78 kg/m  as calculated from the following:    Height as of this encounter: 1.492 m (4' 10.75\").    Weight as of this encounter: 70.8 kg (156 lb).  EXAM:   GENERAL: healthy, alert and no distress  EYES: Eyes grossly normal to inspection, PERRL and conjunctivae and sclerae normal  HENT: ear canals and TM's normal. Nasal mucosa edematous, erythematous. Maxillary sinus TTP on the left  NECK: no adenopathy, no asymmetry, masses, or scars and thyroid normal to palpation  RESP: lungs clear to auscultation - no rales, rhonchi or wheezes  BREAST: normal without masses, tenderness or nipple discharge and no palpable axillary masses or adenopathy  CV: regular rate and rhythm, normal S1 S2, no S3 or S4, no murmur, click or rub, no peripheral edema and peripheral pulses strong  ABDOMEN: soft, nontender, no hepatosplenomegaly, no masses and bowel sounds normal  MS: no gross musculoskeletal defects noted, no edema  SKIN: no suspicious lesions or rashes  NEURO: Normal strength and tone, mentation intact and speech normal  PSYCH: mentation appears normal, affect normal/bright        ASSESSMENT / PLAN:   (Z00.00) Routine general medical examination at a health care facility  (primary encounter diagnosis)  Comment: Other than listed, normal exam today.  Plan: Hepatitis C Screen Reflex to HCV RNA Quant and         Genotype, Lipid Profile, Comprehensive         metabolic panel, CBC with platelets, TSH with         free T4 reflex            (R00.2) Palpitations  Comment: Refer to cardiology  Plan: CARDIOLOGY EVAL ADULT REFERRAL, EKG 12-lead         complete w/read - (Clinic Performed)            (Z86.010) History of colonic polyps  Comment: Patient thinks she is UTD with colonoscopy and dhe has had procedure since 2017. It is not on our record. She will check her own records and call. Will refer " "accordingly as she is scheduled for 3 year f/u      (J01.00) Acute maxillary sinusitis, recurrence not specified  Comment: Rest, increase fluids, Tylenol for fever or discomfort. Return to clinic if symptoms persist or worsen.  Plan: doxycycline hyclate (VIBRAMYCIN) 100 MG capsule                    COUNSELING:  Reviewed preventive health counseling, as reflected in patient instructions       Regular exercise       Healthy diet/nutrition    Estimated body mass index is 31.78 kg/m  as calculated from the following:    Height as of this encounter: 1.492 m (4' 10.75\").    Weight as of this encounter: 70.8 kg (156 lb).        She reports that she has never smoked. She has never used smokeless tobacco.    Appropriate preventive services were discussed with this patient, including applicable screening as appropriate for cardiovascular disease, diabetes, osteopenia/osteoporosis, and glaucoma.  As appropriate for age/gender, discussed screening for colorectal cancer, prostate cancer, breast cancer, and cervical cancer. Checklist reviewing preventive services available has been given to the patient.    Reviewed patients plan of care and provided an AVS. The Basic Care Plan (routine screening as documented in Health Maintenance) for Ria meets the Care Plan requirement. This Care Plan has been established and reviewed with the Patient.    Counseling Resources:  ATP IV Guidelines  Pooled Cohorts Equation Calculator  Breast Cancer Risk Calculator  BRCA-Related Cancer Risk Assessment: FHS-7 Tool  FRAX Risk Assessment  ICSI Preventive Guidelines  Dietary Guidelines for Americans, 2010  USDA's MyPlate  ASA Prophylaxis  Lung CA Screening    LUIS CARLOS Edwards  [facility]  "

## 2021-03-29 NOTE — PATIENT INSTRUCTIONS
Preventive Health Recommendations    See your health care provider every year to    Review health changes.     Discuss preventive care.      Review your medicines if your doctor has prescribed any.      You no longer need a yearly Pap test unless you've had an abnormal Pap test in the past 10 years. If you have vaginal symptoms, such as bleeding or discharge, be sure to talk with your provider about a Pap test.      Every 1 to 2 years, have a mammogram.  If you are over 69, talk with your health care provider about whether or not you want to continue having screening mammograms.      Every 10 years, have a colonoscopy. Or, have a yearly FIT test (stool test). These exams will check for colon cancer.       Have a cholesterol test every 5 years, or more often if your doctor advises it.       Have a diabetes test (fasting glucose) every three years. If you are at risk for diabetes, you should have this test more often.       At age 65, have a bone density scan (DEXA) to check for osteoporosis (brittle bone disease).    Shots:    Get a flu shot each year.    Get a tetanus shot every 10 years.    Talk to your doctor about your pneumonia vaccines. There are now two you should receive - Pneumovax (PPSV 23) and Prevnar (PCV 13).    Talk to your pharmacist about the shingles vaccine.    Talk to your doctor about the hepatitis B vaccine.    Nutrition:     Eat at least 5 servings of fruits and vegetables each day.      Eat whole-grain bread, whole-wheat pasta and brown rice instead of white grains and rice.      Get adequate about Calcium and Vitamin D.     Lifestyle    Exercise at least 150 minutes a week (30 minutes a day, 5 days a week). This will help you control your weight and prevent disease.      Limit alcohol to one drink per day.      No smoking.       Wear sunscreen to prevent skin cancer.       See your dentist twice a year for an exam and cleaning.      See your eye doctor every 1 to 2 years to screen for  conditions such as glaucoma, macular degeneration, cataracts, etc.    Personalized Prevention Plan  You are due for the preventive services outlined below.  Your care team is available to assist you in scheduling these services.  If you have already completed any of these items, please share that information with your care team to update in your medical record.    Health Maintenance Due   Topic Date Due     Osteoporosis Screening  Never done     COVID-19 Vaccine (1) Never done     Hepatitis C Screening  Never done     Zoster (Shingles) Vaccine (1 of 2) Never done     Pneumococcal Vaccine (1 of 1 - PPSV23) Never done     Mammogram  08/03/2020     Flu Vaccine (1) Never done

## 2021-04-16 ENCOUNTER — HOSPITAL ENCOUNTER (OUTPATIENT)
Dept: BONE DENSITY | Facility: OTHER | Age: 71
End: 2021-04-16
Attending: PHYSICIAN ASSISTANT
Payer: MEDICARE

## 2021-04-16 ENCOUNTER — OFFICE VISIT (OUTPATIENT)
Dept: FAMILY MEDICINE | Facility: OTHER | Age: 71
End: 2021-04-16
Attending: PHYSICIAN ASSISTANT
Payer: MEDICARE

## 2021-04-16 ENCOUNTER — HOSPITAL ENCOUNTER (OUTPATIENT)
Dept: MAMMOGRAPHY | Facility: OTHER | Age: 71
End: 2021-04-16
Attending: PHYSICIAN ASSISTANT
Payer: MEDICARE

## 2021-04-16 VITALS
HEART RATE: 80 BPM | OXYGEN SATURATION: 98 % | WEIGHT: 156 LBS | SYSTOLIC BLOOD PRESSURE: 108 MMHG | HEIGHT: 59 IN | RESPIRATION RATE: 16 BRPM | TEMPERATURE: 98.2 F | BODY MASS INDEX: 31.45 KG/M2 | DIASTOLIC BLOOD PRESSURE: 66 MMHG

## 2021-04-16 DIAGNOSIS — Z00.00 ROUTINE GENERAL MEDICAL EXAMINATION AT A HEALTH CARE FACILITY: Primary | ICD-10-CM

## 2021-04-16 DIAGNOSIS — R00.2 PALPITATIONS: ICD-10-CM

## 2021-04-16 DIAGNOSIS — Z12.31 ENCOUNTER FOR SCREENING MAMMOGRAM FOR BREAST CANCER: ICD-10-CM

## 2021-04-16 DIAGNOSIS — Z78.0 MENOPAUSE: ICD-10-CM

## 2021-04-16 DIAGNOSIS — J01.00 ACUTE MAXILLARY SINUSITIS, RECURRENCE NOT SPECIFIED: ICD-10-CM

## 2021-04-16 DIAGNOSIS — Z86.0100 HISTORY OF COLONIC POLYPS: ICD-10-CM

## 2021-04-16 LAB
ALBUMIN SERPL-MCNC: 3.7 G/DL (ref 3.4–5)
ALP SERPL-CCNC: 77 U/L (ref 40–150)
ALT SERPL W P-5'-P-CCNC: 29 U/L (ref 0–50)
ANION GAP SERPL CALCULATED.3IONS-SCNC: 4 MMOL/L (ref 3–14)
AST SERPL W P-5'-P-CCNC: 21 U/L (ref 0–45)
BILIRUB SERPL-MCNC: 0.3 MG/DL (ref 0.2–1.3)
BUN SERPL-MCNC: 25 MG/DL (ref 7–30)
CALCIUM SERPL-MCNC: 8.9 MG/DL (ref 8.5–10.1)
CHLORIDE SERPL-SCNC: 105 MMOL/L (ref 94–109)
CHOLEST SERPL-MCNC: 209 MG/DL
CO2 SERPL-SCNC: 30 MMOL/L (ref 20–32)
CREAT SERPL-MCNC: 0.62 MG/DL (ref 0.52–1.04)
ERYTHROCYTE [DISTWIDTH] IN BLOOD BY AUTOMATED COUNT: 13.7 % (ref 10–15)
GFR SERPL CREATININE-BSD FRML MDRD: >90 ML/MIN/{1.73_M2}
GLUCOSE SERPL-MCNC: 86 MG/DL (ref 70–99)
HCT VFR BLD AUTO: 38.2 % (ref 35–47)
HDLC SERPL-MCNC: 73 MG/DL
HGB BLD-MCNC: 12.6 G/DL (ref 11.7–15.7)
LDLC SERPL CALC-MCNC: 109 MG/DL
MCH RBC QN AUTO: 31.6 PG (ref 26.5–33)
MCHC RBC AUTO-ENTMCNC: 33 G/DL (ref 31.5–36.5)
MCV RBC AUTO: 96 FL (ref 78–100)
NONHDLC SERPL-MCNC: 136 MG/DL
PLATELET # BLD AUTO: 250 10E9/L (ref 150–450)
POTASSIUM SERPL-SCNC: 3.9 MMOL/L (ref 3.4–5.3)
PROT SERPL-MCNC: 7.6 G/DL (ref 6.8–8.8)
RBC # BLD AUTO: 3.99 10E12/L (ref 3.8–5.2)
SODIUM SERPL-SCNC: 139 MMOL/L (ref 133–144)
TRIGL SERPL-MCNC: 135 MG/DL
TSH SERPL DL<=0.005 MIU/L-ACNC: 0.53 MU/L (ref 0.4–4)
WBC # BLD AUTO: 6.6 10E9/L (ref 4–11)

## 2021-04-16 PROCEDURE — 85027 COMPLETE CBC AUTOMATED: CPT | Mod: ZL | Performed by: PHYSICIAN ASSISTANT

## 2021-04-16 PROCEDURE — 93010 ELECTROCARDIOGRAM REPORT: CPT | Performed by: INTERNAL MEDICINE

## 2021-04-16 PROCEDURE — 80061 LIPID PANEL: CPT | Mod: ZL | Performed by: PHYSICIAN ASSISTANT

## 2021-04-16 PROCEDURE — 80053 COMPREHEN METABOLIC PANEL: CPT | Mod: ZL | Performed by: PHYSICIAN ASSISTANT

## 2021-04-16 PROCEDURE — 77063 BREAST TOMOSYNTHESIS BI: CPT

## 2021-04-16 PROCEDURE — 77080 DXA BONE DENSITY AXIAL: CPT

## 2021-04-16 PROCEDURE — 36415 COLL VENOUS BLD VENIPUNCTURE: CPT | Mod: ZL | Performed by: PHYSICIAN ASSISTANT

## 2021-04-16 PROCEDURE — G0463 HOSPITAL OUTPT CLINIC VISIT: HCPCS

## 2021-04-16 PROCEDURE — G0438 PPPS, INITIAL VISIT: HCPCS | Performed by: PHYSICIAN ASSISTANT

## 2021-04-16 PROCEDURE — 84443 ASSAY THYROID STIM HORMONE: CPT | Mod: ZL | Performed by: PHYSICIAN ASSISTANT

## 2021-04-16 PROCEDURE — 86803 HEPATITIS C AB TEST: CPT | Mod: ZL | Performed by: PHYSICIAN ASSISTANT

## 2021-04-16 RX ORDER — DOXYCYCLINE 100 MG/1
100 CAPSULE ORAL 2 TIMES DAILY
Qty: 28 CAPSULE | Refills: 0 | Status: SHIPPED | OUTPATIENT
Start: 2021-04-16 | End: 2021-04-30

## 2021-04-16 ASSESSMENT — PAIN SCALES - GENERAL: PAINLEVEL: NO PAIN (0)

## 2021-04-16 ASSESSMENT — MIFFLIN-ST. JEOR: SCORE: 1129.27

## 2021-04-16 NOTE — NURSING NOTE
"Chief Complaint   Patient presents with     Physical       Initial /66 (BP Location: Right arm, Patient Position: Chair, Cuff Size: Adult Regular)   Pulse 80   Temp 98.2  F (36.8  C) (Tympanic)   Resp 16   Ht 1.492 m (4' 10.75\")   Wt 70.8 kg (156 lb)   LMP  (LMP Unknown)   SpO2 98%   BMI 31.78 kg/m   Estimated body mass index is 31.78 kg/m  as calculated from the following:    Height as of this encounter: 1.492 m (4' 10.75\").    Weight as of this encounter: 70.8 kg (156 lb).  Medication Reconciliation: complete  Maryjane Peralta LPN  "

## 2021-04-18 LAB — HCV AB SERPL QL IA: NONREACTIVE

## 2021-06-28 ENCOUNTER — APPOINTMENT (OUTPATIENT)
Dept: ULTRASOUND IMAGING | Facility: HOSPITAL | Age: 71
End: 2021-06-28
Attending: NURSE PRACTITIONER
Payer: MEDICARE

## 2021-06-28 ENCOUNTER — HOSPITAL ENCOUNTER (EMERGENCY)
Facility: HOSPITAL | Age: 71
Discharge: HOME | End: 2021-06-28
Attending: NURSE PRACTITIONER | Admitting: NURSE PRACTITIONER
Payer: MEDICARE

## 2021-06-28 VITALS
DIASTOLIC BLOOD PRESSURE: 74 MMHG | TEMPERATURE: 97.3 F | OXYGEN SATURATION: 97 % | SYSTOLIC BLOOD PRESSURE: 102 MMHG | HEART RATE: 68 BPM | RESPIRATION RATE: 20 BRPM

## 2021-06-28 DIAGNOSIS — M54.42 ACUTE LOW BACK PAIN WITH LEFT-SIDED SCIATICA: Primary | ICD-10-CM

## 2021-06-28 DIAGNOSIS — R10.2 PELVIC PAIN IN FEMALE: ICD-10-CM

## 2021-06-28 DIAGNOSIS — M54.42 ACUTE LEFT-SIDED LOW BACK PAIN WITH LEFT-SIDED SCIATICA: ICD-10-CM

## 2021-06-28 LAB
ALBUMIN SERPL-MCNC: 3.6 G/DL (ref 3.4–5)
ALBUMIN UR-MCNC: NEGATIVE MG/DL
ALP SERPL-CCNC: 71 U/L (ref 40–150)
ALT SERPL W P-5'-P-CCNC: 27 U/L (ref 0–50)
ANION GAP SERPL CALCULATED.3IONS-SCNC: 5 MMOL/L (ref 3–14)
APPEARANCE UR: CLEAR
AST SERPL W P-5'-P-CCNC: 20 U/L (ref 0–45)
BACTERIA #/AREA URNS HPF: ABNORMAL /HPF
BASOPHILS # BLD AUTO: 0 10E9/L (ref 0–0.2)
BASOPHILS NFR BLD AUTO: 0.2 %
BILIRUB SERPL-MCNC: 0.4 MG/DL (ref 0.2–1.3)
BILIRUB UR QL STRIP: NEGATIVE
BUN SERPL-MCNC: 18 MG/DL (ref 7–30)
CALCIUM SERPL-MCNC: 8.6 MG/DL (ref 8.5–10.1)
CHLORIDE SERPL-SCNC: 106 MMOL/L (ref 94–109)
CO2 SERPL-SCNC: 28 MMOL/L (ref 20–32)
COLOR UR AUTO: ABNORMAL
CREAT SERPL-MCNC: 0.58 MG/DL (ref 0.52–1.04)
DIFFERENTIAL METHOD BLD: NORMAL
EOSINOPHIL # BLD AUTO: 0.1 10E9/L (ref 0–0.7)
EOSINOPHIL NFR BLD AUTO: 0.8 %
ERYTHROCYTE [DISTWIDTH] IN BLOOD BY AUTOMATED COUNT: 12.9 % (ref 10–15)
GFR SERPL CREATININE-BSD FRML MDRD: >90 ML/MIN/{1.73_M2}
GLUCOSE SERPL-MCNC: 77 MG/DL (ref 70–99)
GLUCOSE UR STRIP-MCNC: NEGATIVE MG/DL
HCT VFR BLD AUTO: 38.3 % (ref 35–47)
HGB BLD-MCNC: 12.7 G/DL (ref 11.7–15.7)
HGB UR QL STRIP: ABNORMAL
IMM GRANULOCYTES # BLD: 0 10E9/L (ref 0–0.4)
IMM GRANULOCYTES NFR BLD: 0.2 %
KETONES UR STRIP-MCNC: NEGATIVE MG/DL
LEUKOCYTE ESTERASE UR QL STRIP: NEGATIVE
LYMPHOCYTES # BLD AUTO: 2 10E9/L (ref 0.8–5.3)
LYMPHOCYTES NFR BLD AUTO: 32.9 %
MCH RBC QN AUTO: 30.9 PG (ref 26.5–33)
MCHC RBC AUTO-ENTMCNC: 33.2 G/DL (ref 31.5–36.5)
MCV RBC AUTO: 93 FL (ref 78–100)
MONOCYTES # BLD AUTO: 0.4 10E9/L (ref 0–1.3)
MONOCYTES NFR BLD AUTO: 6.6 %
NEUTROPHILS # BLD AUTO: 3.5 10E9/L (ref 1.6–8.3)
NEUTROPHILS NFR BLD AUTO: 59.3 %
NITRATE UR QL: NEGATIVE
NRBC # BLD AUTO: 0 10*3/UL
NRBC BLD AUTO-RTO: 0 /100
PH UR STRIP: 5.5 PH (ref 4.7–8)
PLATELET # BLD AUTO: 238 10E9/L (ref 150–450)
POTASSIUM SERPL-SCNC: 3.8 MMOL/L (ref 3.4–5.3)
PROT SERPL-MCNC: 7.6 G/DL (ref 6.8–8.8)
RBC # BLD AUTO: 4.11 10E12/L (ref 3.8–5.2)
RBC #/AREA URNS AUTO: 4 /HPF (ref 0–2)
SODIUM SERPL-SCNC: 139 MMOL/L (ref 133–144)
SOURCE: ABNORMAL
SP GR UR STRIP: 1.01 (ref 1–1.03)
SQUAMOUS #/AREA URNS AUTO: <1 /HPF (ref 0–1)
UROBILINOGEN UR STRIP-MCNC: NORMAL MG/DL (ref 0–2)
WBC # BLD AUTO: 5.9 10E9/L (ref 4–11)
WBC #/AREA URNS AUTO: <1 /HPF (ref 0–5)

## 2021-06-28 PROCEDURE — 80053 COMPREHEN METABOLIC PANEL: CPT | Performed by: NURSE PRACTITIONER

## 2021-06-28 PROCEDURE — 81001 URINALYSIS AUTO W/SCOPE: CPT | Performed by: NURSE PRACTITIONER

## 2021-06-28 PROCEDURE — 99213 OFFICE O/P EST LOW 20 MIN: CPT | Performed by: NURSE PRACTITIONER

## 2021-06-28 PROCEDURE — 36415 COLL VENOUS BLD VENIPUNCTURE: CPT | Performed by: NURSE PRACTITIONER

## 2021-06-28 PROCEDURE — G0463 HOSPITAL OUTPT CLINIC VISIT: HCPCS | Mod: 25

## 2021-06-28 PROCEDURE — G0463 HOSPITAL OUTPT CLINIC VISIT: HCPCS

## 2021-06-28 PROCEDURE — 85025 COMPLETE CBC W/AUTO DIFF WBC: CPT | Performed by: NURSE PRACTITIONER

## 2021-06-28 PROCEDURE — 76830 TRANSVAGINAL US NON-OB: CPT

## 2021-06-28 ASSESSMENT — ENCOUNTER SYMPTOMS
NAUSEA: 1
CHILLS: 0
CONSTIPATION: 0
ABDOMINAL PAIN: 1
VOMITING: 0
HEMATURIA: 0
DYSURIA: 0
FEVER: 0
BACK PAIN: 1
FREQUENCY: 0
DIARRHEA: 0

## 2021-06-28 NOTE — ED TRIAGE NOTES
Pt in for evaluation of low back pain ongoing since Wednesday. Pt reports she was going to see the chiropractor but wanted to make sure it was not a UTI first.  Denies fever, dysuria.

## 2021-06-28 NOTE — ED TRIAGE NOTES
"Pt presents today with c/o low back pain ,started Wednesday. \"I did a lot of yard work recently\" \"worse today.\" Also have lower pelvic pain and shooting pains into left leg.   "

## 2021-06-28 NOTE — ED PROVIDER NOTES
"  History     Chief Complaint   Patient presents with     Back Pain     HPI  Ria Warren is a 70 year old female who presents ambulatory to urgent care for general of low back pain.  Onset 6 days ago.  Patient states that she was doing some yard work when she developed low back pain.  History of chronic low back pain which normally just resolves with OTC pain meds and chiropractic adjustments.  Patient states today she developed shooting pain down her left leg along with pain radiating to her lower abdomen. Slight nausea without vomiting. Denies saddle paresthesias, bowel bladder incontinence.  No hematuria or dysuria.  Last bowel movement was this morning and normal per patient's report.  No fevers or chills.    Patient states that she was going to make an appointment with a chiropractor but became concerned when the pain started going to her abdomen.  She would like to rule out a urinary tract infection before she can follow-up with her chiropractor.  No history of kidney stones or frequent UTIs.    Allergies:  Allergies   Allergen Reactions     Azithromycin      Other reaction(s): Vomiting     Penicillins      Other reaction(s): *Unknown  Any \"cillins\" Skin burn from inside out /told to not use         Problem List:    Patient Active Problem List    Diagnosis Date Noted     OME (otitis media with effusion), right 01/23/2020     Priority: Medium     Chronic sinusitis, unspecified location 01/23/2020     Priority: Medium     Special screening for malignant neoplasms, colon 05/16/2017     Priority: Medium     Irritable bowel syndrome 04/27/2017     Priority: Medium     Seasonal allergic rhinitis 04/27/2017     Priority: Medium     Multiple thyroid nodules 04/06/2015     Priority: Medium     Overview:   Seen by Endocrine 4/15, recommend annual exam and ultrasound       KIYA (generalized anxiety disorder) 03/05/2015     Priority: Medium     Atrophic vaginitis 05/03/2012     Priority: Medium        Past Medical " History:    Past Medical History:   Diagnosis Date     Asymptomatic menopausal state      Bitten by cat      Encounter for full-term uncomplicated delivery      Encounter for screening for malignant neoplasm of colon      Generalized anxiety disorder      Low back pain      Lower abdominal pain      Nontoxic goiter      Other chest pain        Past Surgical History:    Past Surgical History:   Procedure Laterality Date     COLONOSCOPY      9/13/2012     COLONOSCOPY  05/17/2017 05/17/2017,follow up 2022 tubular adenoma     OTHER SURGICAL HISTORY      30297.0,PAST SURGICAL HISTORY,2004 Hospitalized for cat bite~1990 Lymph node resection x 3 at left axilla for benign disease~Notes prior complications from anesthesia:     TONSILLECTOMY      No Comments Provided       Family History:    Family History   Problem Relation Age of Onset     Breast Cancer Mother         Cancer-breast,50's     Colon Cancer Maternal Grandfather         Cancer-colon,Colon     Diabetes Maternal Grandfather         Diabetes     Heart Failure Maternal Grandmother         Heart failure     Other - See Comments Other         Maternal side heart disease     Thyroid Disease Other         Thyroid Disease,multiple women with goiters     Asthma Father      Asthma Paternal Uncle      Allergies Daughter      Blood Disease No family hx of         Blood Disease,no blood clotting disorders     Ovarian Cancer No family hx of         Cancer-ovarian       Social History:  Marital Status:   [2]  Social History     Tobacco Use     Smoking status: Never Smoker     Smokeless tobacco: Never Used   Substance Use Topics     Alcohol use: Yes     Alcohol/week: 1.0 standard drinks     Types: 1 Glasses of wine per week     Drug use: Never     Comment: Drug use: No        Medications:    No current outpatient medications on file.        Review of Systems   Constitutional: Negative for chills and fever.   Gastrointestinal: Positive for abdominal pain and nausea.  Negative for constipation, diarrhea and vomiting.   Genitourinary: Positive for pelvic pain. Negative for dysuria, frequency, hematuria and vaginal discharge.   Musculoskeletal: Positive for back pain. Negative for gait problem.   All other systems reviewed and are negative.      Physical Exam   BP: 102/74  Pulse: 68  Temp: 97.3  F (36.3  C)  Resp: 20  SpO2: 97 %      Physical Exam  Vitals signs and nursing note reviewed.   Constitutional:       Appearance: Normal appearance. She is not ill-appearing or toxic-appearing.      Comments: Patient does appear uncomfortable with movement and standing up.   HENT:      Head: Normocephalic.   Eyes:      Pupils: Pupils are equal, round, and reactive to light.   Neck:      Musculoskeletal: Neck supple.   Cardiovascular:      Rate and Rhythm: Normal rate and regular rhythm.      Heart sounds: Normal heart sounds.   Pulmonary:      Effort: Pulmonary effort is normal.      Breath sounds: Normal breath sounds.   Abdominal:      General: Bowel sounds are normal.      Palpations: Abdomen is soft.      Tenderness: There is abdominal tenderness in the right lower quadrant, periumbilical area, suprapubic area and left lower quadrant. There is no right CVA tenderness, left CVA tenderness or guarding.   Musculoskeletal: Normal range of motion.   Neurological:      Mental Status: She is alert and oriented to person, place, and time.         ED Course        Procedures               Results for orders placed or performed during the hospital encounter of 06/28/21 (from the past 24 hour(s))   UA with Microscopic reflex to Culture    Specimen: Midstream Urine   Result Value Ref Range    Color Urine Straw     Appearance Urine Clear     Glucose Urine Negative NEG^Negative mg/dL    Bilirubin Urine Negative NEG^Negative    Ketones Urine Negative NEG^Negative mg/dL    Specific Gravity Urine 1.009 1.003 - 1.035    Blood Urine Trace (A) NEG^Negative    pH Urine 5.5 4.7 - 8.0 pH    Protein Albumin  Urine Negative NEG^Negative mg/dL    Urobilinogen mg/dL Normal 0.0 - 2.0 mg/dL    Nitrite Urine Negative NEG^Negative    Leukocyte Esterase Urine Negative NEG^Negative    Source Midstream Urine     WBC Urine <1 0 - 5 /HPF    RBC Urine 4 (H) 0 - 2 /HPF    Bacteria Urine None (A) NEG^Negative /HPF    Squamous Epithelial /HPF Urine <1 0 - 1 /HPF   CBC with platelets differential   Result Value Ref Range    WBC 5.9 4.0 - 11.0 10e9/L    RBC Count 4.11 3.8 - 5.2 10e12/L    Hemoglobin 12.7 11.7 - 15.7 g/dL    Hematocrit 38.3 35.0 - 47.0 %    MCV 93 78 - 100 fl    MCH 30.9 26.5 - 33.0 pg    MCHC 33.2 31.5 - 36.5 g/dL    RDW 12.9 10.0 - 15.0 %    Platelet Count 238 150 - 450 10e9/L    Diff Method Automated Method     % Neutrophils 59.3 %    % Lymphocytes 32.9 %    % Monocytes 6.6 %    % Eosinophils 0.8 %    % Basophils 0.2 %    % Immature Granulocytes 0.2 %    Nucleated RBCs 0 0 /100    Absolute Neutrophil 3.5 1.6 - 8.3 10e9/L    Absolute Lymphocytes 2.0 0.8 - 5.3 10e9/L    Absolute Monocytes 0.4 0.0 - 1.3 10e9/L    Absolute Eosinophils 0.1 0.0 - 0.7 10e9/L    Absolute Basophils 0.0 0.0 - 0.2 10e9/L    Abs Immature Granulocytes 0.0 0 - 0.4 10e9/L    Absolute Nucleated RBC 0.0    Comprehensive metabolic panel   Result Value Ref Range    Sodium 139 133 - 144 mmol/L    Potassium 3.8 3.4 - 5.3 mmol/L    Chloride 106 94 - 109 mmol/L    Carbon Dioxide 28 20 - 32 mmol/L    Anion Gap 5 3 - 14 mmol/L    Glucose 77 70 - 99 mg/dL    Urea Nitrogen 18 7 - 30 mg/dL    Creatinine 0.58 0.52 - 1.04 mg/dL    GFR Estimate >90 >60 mL/min/[1.73_m2]    GFR Estimate If Black >90 >60 mL/min/[1.73_m2]    Calcium 8.6 8.5 - 10.1 mg/dL    Bilirubin Total 0.4 0.2 - 1.3 mg/dL    Albumin 3.6 3.4 - 5.0 g/dL    Protein Total 7.6 6.8 - 8.8 g/dL    Alkaline Phosphatase 71 40 - 150 U/L    ALT 27 0 - 50 U/L    AST 20 0 - 45 U/L   US Pelvic Complete with Transvaginal    Narrative    PROCEDURE: US PELVIC COMPLETE WITH TRANSVAGINAL    HISTORY: pelvic pain x 2  days. No hx of hysterectomy.     TECHNIQUE: Transabdominal and transvaginal ultrasound of the pelvis.    COMPARISON: None.    FINDINGS:     MEASUREMENTS:  Uterus: 5.8 x 2.5 x 3.9 cm  Endometrium: 5.1 mm in thickness  Right Ovary: 0.8 mL  Left Ovary:  0.5 mL    UTERUS: The endometrial cavity contains a nodular echogenic focus  measuring 9 x 4 x 5 mm. A small amount of endometrial fluid is  present. A few small calcifications are present in the myometrium.    ADNEXA: The ovaries are normal in size. There is no adnexal mass.    MISC: There is no free fluid in the pelvis.      Impression    IMPRESSION: Nodular soft tissue in the endometrial cavity may reflect  hypertrophy, polyp or neoplasm. Recommend gynecologic follow-up.    No finding to account for acute pelvic pain.    KINGSTON WOLFE MD       Medications - No data to display    Assessments & Plan (with Medical Decision Making)   70-year-old female that presented ambulatory for evaluation of low back pain and pelvic pain.  Low back pain initially started while patient was doing some yard work.  She does have a history of chronic low back pain.  Patient developed pain to her pelvis as well as pain radiating down her leg which prompted today's visit.  Negative straight leg raise test.  Mild lower abdominal tenderness with palpation.  No leukocytosis.  No electrolyte abnormalities.  Normal kidney and liver function.  Urinalysis negative for signs of infection.  Pelvic ultrasound was completed showing nodular soft tissue in the endometrial cavity.  No findings that could account for pelvic pain per radiologist reading.    Discussed these findings with patient.  Physical exam findings are consistent with low back pain with sciatica.  Patient declined any pain medication during this visit.  She notes that she would like to follow-up with her chiropractor instead.  Recommended she schedule an appointment with her PCP/OB/GYN for further evaluation of the nodular soft  tissue noted in her ultrasound.  She may take Tylenol or ibuprofen as needed for pain.  Return to emergency department for any concerning symptoms.  Patient verbalized understanding.    I have reviewed the nursing notes.    I have reviewed the findings, diagnosis, plan and need for follow up with the patient.  This document was prepared using a combination of typing and voice generated software.  While every attempt was made for accuracy, spelling and grammatical errors may exist.    New Prescriptions    No medications on file       Final diagnoses:   Acute low back pain with left-sided sciatica   Pelvic pain in female       6/28/2021   [facility]     Mpofu, Prudence, CNP  07/01/21 1222

## 2021-06-28 NOTE — DISCHARGE INSTRUCTIONS
You can continue taking Tylenol or ibuprofen as needed for pain.  Follow-up with your chiropractor for your back pain.    Schedule an appointment with your doctor or OB/GYN for further evaluation of your pelvic pain.    Return to emergency room if any concerning symptoms.

## 2021-07-05 ENCOUNTER — IMMUNIZATION (OUTPATIENT)
Dept: FAMILY MEDICINE | Facility: OTHER | Age: 71
End: 2021-07-05
Attending: INTERNAL MEDICINE
Payer: MEDICARE

## 2021-07-05 PROCEDURE — 91300 PR COVID VAC PFIZER DIL RECON 30 MCG/0.3 ML IM: CPT

## 2021-07-26 ENCOUNTER — IMMUNIZATION (OUTPATIENT)
Dept: FAMILY MEDICINE | Facility: OTHER | Age: 71
End: 2021-07-26
Attending: FAMILY MEDICINE
Payer: MEDICARE

## 2021-07-26 PROCEDURE — 91300 PR COVID VAC PFIZER DIL RECON 30 MCG/0.3 ML IM: CPT

## 2021-07-28 ENCOUNTER — HOSPITAL ENCOUNTER (EMERGENCY)
Facility: OTHER | Age: 71
Discharge: HOME | End: 2021-07-28
Attending: PHYSICIAN ASSISTANT | Admitting: PHYSICIAN ASSISTANT
Payer: MEDICARE

## 2021-07-28 ENCOUNTER — APPOINTMENT (OUTPATIENT)
Dept: CT IMAGING | Facility: OTHER | Age: 71
End: 2021-07-28
Attending: PHYSICIAN ASSISTANT
Payer: MEDICARE

## 2021-07-28 VITALS
TEMPERATURE: 98.6 F | DIASTOLIC BLOOD PRESSURE: 75 MMHG | OXYGEN SATURATION: 98 % | SYSTOLIC BLOOD PRESSURE: 167 MMHG | HEIGHT: 58 IN | RESPIRATION RATE: 15 BRPM | BODY MASS INDEX: 32.75 KG/M2 | HEART RATE: 76 BPM | WEIGHT: 156 LBS

## 2021-07-28 DIAGNOSIS — K57.92 DIVERTICULITIS: ICD-10-CM

## 2021-07-28 DIAGNOSIS — R10.31 RLQ ABDOMINAL PAIN: ICD-10-CM

## 2021-07-28 LAB
ALBUMIN SERPL-MCNC: 4.3 G/DL (ref 3.5–5.7)
ALBUMIN UR-MCNC: NEGATIVE MG/DL
ALP SERPL-CCNC: 63 U/L (ref 34–104)
ALT SERPL W P-5'-P-CCNC: 15 U/L (ref 7–52)
ANION GAP SERPL CALCULATED.3IONS-SCNC: 7 MMOL/L (ref 3–14)
APPEARANCE UR: CLEAR
AST SERPL W P-5'-P-CCNC: 21 U/L (ref 13–39)
BACTERIA #/AREA URNS HPF: ABNORMAL /HPF
BASOPHILS # BLD AUTO: 0 10E3/UL (ref 0–0.2)
BASOPHILS NFR BLD AUTO: 0 %
BILIRUB SERPL-MCNC: 0.6 MG/DL (ref 0.3–1)
BILIRUB UR QL STRIP: NEGATIVE
BUN SERPL-MCNC: 15 MG/DL (ref 7–25)
CALCIUM SERPL-MCNC: 9.9 MG/DL (ref 8.6–10.3)
CHLORIDE BLD-SCNC: 101 MMOL/L (ref 98–107)
CO2 SERPL-SCNC: 28 MMOL/L (ref 21–31)
COLOR UR AUTO: ABNORMAL
CREAT SERPL-MCNC: 0.62 MG/DL (ref 0.6–1.2)
EOSINOPHIL # BLD AUTO: 0.1 10E3/UL (ref 0–0.7)
EOSINOPHIL NFR BLD AUTO: 1 %
ERYTHROCYTE [DISTWIDTH] IN BLOOD BY AUTOMATED COUNT: 13 % (ref 10–15)
GFR SERPL CREATININE-BSD FRML MDRD: >90 ML/MIN/1.73M2
GLUCOSE BLD-MCNC: 84 MG/DL (ref 70–105)
GLUCOSE UR STRIP-MCNC: NEGATIVE MG/DL
HCT VFR BLD AUTO: 39.6 % (ref 35–47)
HGB BLD-MCNC: 13.5 G/DL (ref 11.7–15.7)
HGB UR QL STRIP: ABNORMAL
IMM GRANULOCYTES # BLD: 0 10E3/UL
IMM GRANULOCYTES NFR BLD: 0 %
KETONES UR STRIP-MCNC: NEGATIVE MG/DL
LEUKOCYTE ESTERASE UR QL STRIP: NEGATIVE
LYMPHOCYTES # BLD AUTO: 1.7 10E3/UL (ref 0.8–5.3)
LYMPHOCYTES NFR BLD AUTO: 16 %
MCH RBC QN AUTO: 31.2 PG (ref 26.5–33)
MCHC RBC AUTO-ENTMCNC: 34.1 G/DL (ref 31.5–36.5)
MCV RBC AUTO: 92 FL (ref 78–100)
MONOCYTES # BLD AUTO: 0.8 10E3/UL (ref 0–1.3)
MONOCYTES NFR BLD AUTO: 7 %
MUCOUS THREADS #/AREA URNS LPF: PRESENT /LPF
NEUTROPHILS # BLD AUTO: 8.2 10E3/UL (ref 1.6–8.3)
NEUTROPHILS NFR BLD AUTO: 76 %
NITRATE UR QL: NEGATIVE
NRBC # BLD AUTO: 0 10E3/UL
NRBC BLD AUTO-RTO: 0 /100
PH UR STRIP: 5.5 [PH] (ref 5–9)
PLATELET # BLD AUTO: 196 10E3/UL (ref 150–450)
POTASSIUM BLD-SCNC: 4.1 MMOL/L (ref 3.5–5.1)
PROT SERPL-MCNC: 7.6 G/DL (ref 6.4–8.9)
RBC # BLD AUTO: 4.33 10E6/UL (ref 3.8–5.2)
RBC URINE: 4 /HPF
SODIUM SERPL-SCNC: 136 MMOL/L (ref 134–144)
SP GR UR STRIP: 1.02 (ref 1–1.03)
UROBILINOGEN UR STRIP-MCNC: NORMAL MG/DL
WBC # BLD AUTO: 10.7 10E3/UL (ref 4–11)
WBC URINE: <1 /HPF

## 2021-07-28 PROCEDURE — 85025 COMPLETE CBC W/AUTO DIFF WBC: CPT | Performed by: PHYSICIAN ASSISTANT

## 2021-07-28 PROCEDURE — 99284 EMERGENCY DEPT VISIT MOD MDM: CPT | Performed by: PHYSICIAN ASSISTANT

## 2021-07-28 PROCEDURE — 81001 URINALYSIS AUTO W/SCOPE: CPT | Performed by: PHYSICIAN ASSISTANT

## 2021-07-28 PROCEDURE — 99284 EMERGENCY DEPT VISIT MOD MDM: CPT | Mod: 25 | Performed by: PHYSICIAN ASSISTANT

## 2021-07-28 PROCEDURE — 81001 URINALYSIS AUTO W/SCOPE: CPT | Performed by: STUDENT IN AN ORGANIZED HEALTH CARE EDUCATION/TRAINING PROGRAM

## 2021-07-28 PROCEDURE — 80053 COMPREHEN METABOLIC PANEL: CPT | Performed by: PHYSICIAN ASSISTANT

## 2021-07-28 PROCEDURE — 85025 COMPLETE CBC W/AUTO DIFF WBC: CPT | Performed by: STUDENT IN AN ORGANIZED HEALTH CARE EDUCATION/TRAINING PROGRAM

## 2021-07-28 PROCEDURE — 74176 CT ABD & PELVIS W/O CONTRAST: CPT

## 2021-07-28 PROCEDURE — 80053 COMPREHEN METABOLIC PANEL: CPT | Performed by: STUDENT IN AN ORGANIZED HEALTH CARE EDUCATION/TRAINING PROGRAM

## 2021-07-28 PROCEDURE — 99283 EMERGENCY DEPT VISIT LOW MDM: CPT | Performed by: PHYSICIAN ASSISTANT

## 2021-07-28 PROCEDURE — 36415 COLL VENOUS BLD VENIPUNCTURE: CPT | Performed by: STUDENT IN AN ORGANIZED HEALTH CARE EDUCATION/TRAINING PROGRAM

## 2021-07-28 RX ORDER — LORAZEPAM 2 MG/ML
0.5 INJECTION INTRAMUSCULAR ONCE
Status: DISCONTINUED | OUTPATIENT
Start: 2021-07-28 | End: 2021-07-28 | Stop reason: CLARIF

## 2021-07-28 RX ORDER — SACCHAROMYCES BOULARDII 250 MG
250 CAPSULE ORAL 2 TIMES DAILY
Qty: 20 CAPSULE | Refills: 0 | Status: SHIPPED | OUTPATIENT
Start: 2021-07-28 | End: 2021-08-07

## 2021-07-28 RX ORDER — METRONIDAZOLE 500 MG/1
500 TABLET ORAL 2 TIMES DAILY
Qty: 14 TABLET | Refills: 0 | Status: SHIPPED | OUTPATIENT
Start: 2021-07-28 | End: 2021-08-04

## 2021-07-28 RX ORDER — CIPROFLOXACIN 500 MG/1
500 TABLET, FILM COATED ORAL 2 TIMES DAILY
Qty: 14 TABLET | Refills: 0 | Status: SHIPPED | OUTPATIENT
Start: 2021-07-28 | End: 2021-08-04

## 2021-07-28 ASSESSMENT — ENCOUNTER SYMPTOMS
DIARRHEA: 0
STRIDOR: 0
NECK PAIN: 0
ABDOMINAL DISTENTION: 0
CONSTIPATION: 0
FACIAL SWELLING: 0
DIZZINESS: 0
LIGHT-HEADEDNESS: 0
TREMORS: 0
NAUSEA: 1
EYE PAIN: 0
WHEEZING: 0
SORE THROAT: 0
FATIGUE: 0
ABDOMINAL PAIN: 1
BACK PAIN: 0
SEIZURES: 0
FEVER: 0
VOMITING: 0
CHILLS: 0
FLANK PAIN: 0
AGITATION: 0

## 2021-07-28 ASSESSMENT — MIFFLIN-ST. JEOR: SCORE: 1117.36

## 2021-07-28 NOTE — ED TRIAGE NOTES
"Pt here by herself with c/o rt lower quadrant pain that started last night, pain is intermittent and she is nauseated, VSs, pt out into waiting room to wait for ED room  ED Nursing Triage Note (General)   ________________________________    Ria Warren is a 70 year old Female that presents to triage private car  With history of  Abdominal pain reported by patient   Significant symptoms had onset last night  BP (!) 167/75   Pulse 76   Temp 98.6  F (37  C) (Tympanic)   Resp 15   Ht 1.473 m (4' 10\")   Wt 70.8 kg (156 lb)   LMP  (LMP Unknown)   SpO2 98%   BMI 32.60 kg/m  t  Patient appears alert  and oriented, in no acute distress., and cooperative and pleasant behavior.    GCS Total = 15  Airway: intact  Breathing noted as Normal  Circulation Normal  Skin:  Normal  Action taken: pt to waiting room       PRE HOSPITAL PRIOR LIVING SITUATION Alone    "

## 2021-07-28 NOTE — ED PROVIDER NOTES
"  History     Chief Complaint   Patient presents with     Abdominal Pain     HPI  Ria Warren is a 70 year old female who reports having some right lower quadrant abdominal pain that started last night.  The pain is been in her mitten with some increased nausea.  Denies any emesis.  No lightheadedness or dizziness.  No previous abdominal issues.  She is concerned about may be an ovarian cyst versus her appendix.  Patient had an extended stay in the emergency room waiting room there for labs were ordered in advance.    Allergies:  Allergies   Allergen Reactions     Azithromycin      Other reaction(s): Vomiting     Penicillins      Other reaction(s): *Unknown  Any \"cillins\" Skin burn from inside out /told to not use         Problem List:    Patient Active Problem List    Diagnosis Date Noted     OME (otitis media with effusion), right 01/23/2020     Priority: Medium     Chronic sinusitis, unspecified location 01/23/2020     Priority: Medium     Special screening for malignant neoplasms, colon 05/16/2017     Priority: Medium     Irritable bowel syndrome 04/27/2017     Priority: Medium     Seasonal allergic rhinitis 04/27/2017     Priority: Medium     Multiple thyroid nodules 04/06/2015     Priority: Medium     Overview:   Seen by Endocrine 4/15, recommend annual exam and ultrasound       KIYA (generalized anxiety disorder) 03/05/2015     Priority: Medium     Atrophic vaginitis 05/03/2012     Priority: Medium        Past Medical History:    Past Medical History:   Diagnosis Date     Asymptomatic menopausal state      Bitten by cat      Encounter for full-term uncomplicated delivery      Encounter for screening for malignant neoplasm of colon      Generalized anxiety disorder      Low back pain      Lower abdominal pain      Nontoxic goiter      Other chest pain        Past Surgical History:    Past Surgical History:   Procedure Laterality Date     COLONOSCOPY      9/13/2012     COLONOSCOPY  05/17/2017    " 05/17/2017,follow up 2022 tubular adenoma     OTHER SURGICAL HISTORY      42181.0,PAST SURGICAL HISTORY,2004 Hospitalized for cat bite~1990 Lymph node resection x 3 at left axilla for benign disease~Notes prior complications from anesthesia:     TONSILLECTOMY      No Comments Provided       Family History:    Family History   Problem Relation Age of Onset     Breast Cancer Mother         Cancer-breast,50's     Colon Cancer Maternal Grandfather         Cancer-colon,Colon     Diabetes Maternal Grandfather         Diabetes     Heart Failure Maternal Grandmother         Heart failure     Other - See Comments Other         Maternal side heart disease     Thyroid Disease Other         Thyroid Disease,multiple women with goiters     Asthma Father      Asthma Paternal Uncle      Allergies Daughter      Blood Disease No family hx of         Blood Disease,no blood clotting disorders     Ovarian Cancer No family hx of         Cancer-ovarian       Social History:  Marital Status:   [2]  Social History     Tobacco Use     Smoking status: Never Smoker     Smokeless tobacco: Never Used   Substance Use Topics     Alcohol use: Yes     Alcohol/week: 1.0 standard drinks     Types: 1 Glasses of wine per week     Drug use: Never     Comment: Drug use: No        Medications:    ciprofloxacin (CIPRO) 500 MG tablet  metroNIDAZOLE (FLAGYL) 500 MG tablet  saccharomyces boulardii (FLORASTOR) 250 MG capsule          Review of Systems   Constitutional: Negative for chills, fatigue and fever.   HENT: Negative for facial swelling and sore throat.    Eyes: Negative for pain.   Respiratory: Negative for wheezing and stridor.    Cardiovascular: Negative for chest pain.   Gastrointestinal: Positive for abdominal pain and nausea. Negative for abdominal distention, constipation, diarrhea and vomiting.   Genitourinary: Negative for flank pain.   Musculoskeletal: Negative for back pain and neck pain.   Skin: Negative for pallor.   Neurological:  "Negative for dizziness, tremors, seizures and light-headedness.   Psychiatric/Behavioral: Negative for agitation.   All other systems reviewed and are negative.      Physical Exam   BP: (!) 167/75  Pulse: 76  Temp: 98.6  F (37  C)  Resp: 15  Height: 147.3 cm (4' 10\")  Weight: 70.8 kg (156 lb)  SpO2: 98 %      Physical Exam  Vitals and nursing note reviewed.   Constitutional:       General: She is not in acute distress.     Appearance: She is not ill-appearing or toxic-appearing.   HENT:      Head: No raccoon eyes or Vigil's sign.      Jaw: No trismus.      Right Ear: No drainage or tenderness.      Left Ear: No drainage or tenderness.      Nose: Nose normal.   Eyes:      General: Lids are normal. Gaze aligned appropriately. No scleral icterus.     Extraocular Movements: Extraocular movements intact.      Right eye: No nystagmus.      Left eye: No nystagmus.      Pupils:      Right eye: Pupil is reactive and not sluggish.      Left eye: Pupil is reactive and not sluggish.   Neck:      Vascular: No JVD.      Trachea: No tracheal deviation.   Cardiovascular:      Rate and Rhythm: Normal rate.   Pulmonary:      Effort: Pulmonary effort is normal. No respiratory distress.      Breath sounds: No stridor. No wheezing.   Abdominal:      General: Bowel sounds are normal. There is no distension.      Palpations: There is no mass.      Tenderness: There is abdominal tenderness. There is no right CVA tenderness, left CVA tenderness or guarding.      Comments: Right lower quadrant minimal discomfort on palpation no rebound or masses.  Bowel sounds are normal.   Musculoskeletal:         General: No deformity or signs of injury. Normal range of motion.   Skin:     General: Skin is warm and dry.      Coloration: Skin is not jaundiced or pale.   Neurological:      General: No focal deficit present.      Mental Status: She is alert and oriented to person, place, and time.      GCS: GCS eye subscore is 4. GCS verbal subscore is 5. " GCS motor subscore is 6.      Motor: No tremor or seizure activity.   Psychiatric:         Mood and Affect: Mood normal.         ED Course     ED Course as of Jul 28 1735   Wed Jul 28, 2021 1735 CBC with platelets differential       Results for orders placed or performed during the hospital encounter of 07/28/21 (from the past 24 hour(s))   UA reflex to Microscopic   Result Value Ref Range    Color Urine Light Yellow Colorless, Straw, Light Yellow, Yellow    Appearance Urine Clear Clear    Glucose Urine Negative Negative mg/dL    Bilirubin Urine Negative Negative    Ketones Urine Negative Negative mg/dL    Specific Gravity Urine 1.018 1.000 - 1.030    Blood Urine Small (A) Negative    pH Urine 5.5 5.0 - 9.0    Protein Albumin Urine Negative Negative mg/dL    Urobilinogen Urine Normal Normal, 2.0 mg/dL    Nitrite Urine Negative Negative    Leukocyte Esterase Urine Negative Negative    Bacteria Urine Few (A) None Seen /HPF    RBC Urine 4 (H) <=2 /HPF    WBC Urine <1 <=5 /HPF    Mucus Urine Present (A) None Seen /LPF   CBC with platelets differential    Narrative    The following orders were created for panel order CBC with platelets differential.  Procedure                               Abnormality         Status                     ---------                               -----------         ------                     CBC with platelets and d...[[id number]]                      Final result                 Please view results for these tests on the individual orders.   Comprehensive metabolic panel   Result Value Ref Range    Sodium 136 134 - 144 mmol/L    Potassium 4.1 3.5 - 5.1 mmol/L    Chloride 101 98 - 107 mmol/L    Carbon Dioxide (CO2) 28 21 - 31 mmol/L    Anion Gap 7 3 - 14 mmol/L    Urea Nitrogen 15 7 - 25 mg/dL    Creatinine 0.62 0.60 - 1.20 mg/dL    Calcium 9.9 8.6 - 10.3 mg/dL    Glucose 84 70 - 105 mg/dL    Alkaline Phosphatase 63 34 - 104 U/L    AST 21 13 - 39 U/L    ALT 15 7 - 52 U/L    Protein Total  7.6 6.4 - 8.9 g/dL    Albumin 4.3 3.5 - 5.7 g/dL    Bilirubin Total 0.6 0.3 - 1.0 mg/dL    GFR Estimate >90 >60 mL/min/1.73m2   CBC with platelets and differential   Result Value Ref Range    WBC Count 10.7 4.0 - 11.0 10e3/uL    RBC Count 4.33 3.80 - 5.20 10e6/uL    Hemoglobin 13.5 11.7 - 15.7 g/dL    Hematocrit 39.6 35.0 - 47.0 %    MCV 92 78 - 100 fL    MCH 31.2 26.5 - 33.0 pg    MCHC 34.1 31.5 - 36.5 g/dL    RDW 13.0 10.0 - 15.0 %    Platelet Count 196 150 - 450 10e3/uL    % Neutrophils 76 %    % Lymphocytes 16 %    % Monocytes 7 %    % Eosinophils 1 %    % Basophils 0 %    % Immature Granulocytes 0 %    NRBCs per 100 WBC 0 <1 /100    Absolute Neutrophils 8.2 1.6 - 8.3 10e3/uL    Absolute Lymphocytes 1.7 0.8 - 5.3 10e3/uL    Absolute Monocytes 0.8 0.0 - 1.3 10e3/uL    Absolute Eosinophils 0.1 0.0 - 0.7 10e3/uL    Absolute Basophils 0.0 0.0 - 0.2 10e3/uL    Absolute Immature Granulocytes 0.0 <=0.0 10e3/uL    Absolute NRBCs 0.0 10e3/uL   CT Abdomen Pelvis w/o Contrast    Narrative    Exam: CT ABDOMEN PELVIS W/O CONTRAST    Exam reason: Right adnexal paIN    Technique: Using helical CT technique, axial images of the abdomen and  pelvis  were acquired without administration of IV contrast. Coronal  and sagittal reformats were performed.    Comparison: Pelvic ultrasound on 6/28/2021.    FINDINGS:    NOTE: Noncontrast images are insensitive for detection of solid organ  abnormalities and some other types of pathology.    ABDOMEN:     Liver:  No focal mass.    Gallbladder:  There is some relatively dense layering material within  the gallbladder, likely representing sludge or small stones.  Bile Ducts:  No significantly dilated ducts.  Spleen:  Normal size without focal abnormality.  Kidneys:  Normal size without hydronephrosis. No calculi are present.  Adrenals:  No nodules.  Pancreas:  No mass or peripancreatic fat stranding.   Lymph Nodes:   No significant adenopathy.   Vascular:  No aortic aneurysm.   Abdominal  wall:   Small fat-containing umbilical hernia.    Pelvis: No mass or adenopathy. There are a few calcifications within  the uterus which likely represent degenerated fibroids.    Bowel/Mesentery/Peritoneum:   -No evidence of bowel obstruction.   -There is colonic wall thickening and pericolonic fat stranding of the  distal descending colon (for example series 2 image 52 and series 3  image 46), in a segment where there are several diverticuli. There is  a small amount of adjacent free fluid (for example series 2 image 44).  No free air.  -Normal appendix.    Visualized portion of the Chest: No consolidation or pleural effusion.      Musculoskeletal: No acute findings. No suspicious lytic or blastic  lesions.       Impression    IMPRESSION:    Findings compatible with diverticulitis of the distal descending  colon. There is a small amount of adjacent free fluid. No  pneumoperitoneum.    PINEDA LOPEZ MD         SYSTEM ID:  [id number]       Medications - No data to display    Assessments & Plan (with Medical Decision Making)     I have reviewed the nursing notes.    I have reviewed the findings, diagnosis, plan and need for follow up with the patient.      Discharge Medication List as of 7/28/2021  4:57 PM      START taking these medications    Details   ciprofloxacin (CIPRO) 500 MG tablet Take 1 tablet (500 mg) by mouth 2 times daily for 7 days, Disp-14 tablet, R-0, Local Print      metroNIDAZOLE (FLAGYL) 500 MG tablet Take 1 tablet (500 mg) by mouth 2 times daily for 7 days, Disp-14 tablet, R-0, Local PrintEat yogurt or cottage cheese daily to prevent diarrhea that can be caused by taking this medication.      saccharomyces boulardii (FLORASTOR) 250 MG capsule Take 1 capsule (250 mg) by mouth 2 times daily for 10 days, Disp-20 capsule, R-0, Local Print             Final diagnoses:   RLQ abdominal pain   Diverticulitis     Afebrile.  Vital signs stable.  Sudden onset of right lower quadrant abdominal pain.  No  previous surgeries.   Does not appear to be in distress.  Initial labs were ordered while she was in the waiting room these show a CBC is unremarkable no signs of elevated white blood cells.  Normal hemoglobin.  CMP is unremarkable.  UA shows few bacteria but no significant signs of UTI.  I had a discussion with the patient about continued monitoring versus further evaluation at this point.  She would like to proceed with a CT of her abdomen but declines need for IV fluids or IV contrast.  CT of her abdomen and pelvis shows Findings compatible with diverticulitis of the distal descending colon. There is a small amount of adjacent free fluid. No pneumoperitoneum.  Discussed these findings with the patient as well as discharge diet.  Rx for Cipro and Flagyl as well as for probiotics.  She will continue to monitor and return if there is any concerns for further evaluation as needed.  7/28/2021   [facility]     Tam Galindo PA-C  07/28/21 [id number]

## 2021-08-24 NOTE — PROGRESS NOTES
"    Assessment & Plan     (N94.89) Endometrial mass  (primary encounter diagnosis)  Comment: refer to Dr. Plummer at [facility] in [place], [place] for further evaluation   Plan: Ob/Gyn Referral            (R22.0) Facial mass  Comment: concern for basal versus squamous cell   Plan: Adult General Surg Referral            BMI:   Estimated body mass index is 33.23 kg/m  as calculated from the following:    Height as of 7/28/21: 1.473 m (4' 10\").    Weight as of this encounter: 72.1 kg (159 lb).   Weight management plan: Discussed healthy diet and exercise guidelines    See Patient Instructions    Return if symptoms worsen or fail to improve.    Emmy Barrett NP  [facility]    Irasema Rollins is a 70 year old who presents for the following health issues     HPI     Skin Lesion  Onset/Duration: 9 months  Description  Location: right forehead  Color: brown  Character: raised  Itching: moderate  Bleeding:  no  Intensity:  moderate  Progression of Symptoms:  worsening  Accompanying signs and symptoms:   Bleeding: only when she scratches  Scaling: no  Excessive sun exposure/tanning: YES  Sunscreen used: YES  History:           Any previous history of skin cancer: no  Any family history of melanoma: no  Previous episodes of similar lesion: YES  Precipitating or alleviating factors: none  Therapies tried and outcome: none    ED/UC Followup:    Facility:  [facility] [place]  Date of visit: 6/28/21  Reason for visit: abnormal uterine mass  Current Status: needs a referral for ob/gyn for abnormal pelvic ultrasound         Review of Systems   Constitutional, HEENT, cardiovascular, pulmonary, gi and gu systems are negative, except as otherwise noted.      Objective    /74   Pulse 68   Temp (!) 96.1  F (35.6  C)   Wt 72.1 kg (159 lb)   LMP  (LMP Unknown)   SpO2 98%   BMI 33.23 kg/m    Body mass index is 33.23 kg/m .  Physical Exam   GENERAL: healthy, alert and no distress  NECK: no adenopathy, no " asymmetry, masses, or scars and thyroid normal to palpation  RESP: lungs clear to auscultation - no rales, rhonchi or wheezes  CV: regular rate and rhythm, normal S1 S2, no S3 or S4, no murmur, click or rub, no peripheral edema and peripheral pulses strong  MS: no gross musculoskeletal defects noted, no edema  SKIN: no suspicious rashes. +lesion to right side of hairline with irregular border with various coloration. Lesion is raised in the center and scaly on the border  NEURO: Normal strength and tone, mentation intact and speech normal  PSYCH: mentation appears normal, affect normal/bright

## 2021-08-27 ENCOUNTER — OFFICE VISIT (OUTPATIENT)
Dept: FAMILY MEDICINE | Facility: OTHER | Age: 71
End: 2021-08-27
Attending: NURSE PRACTITIONER
Payer: MEDICARE

## 2021-08-27 VITALS
WEIGHT: 159 LBS | DIASTOLIC BLOOD PRESSURE: 74 MMHG | OXYGEN SATURATION: 98 % | HEART RATE: 68 BPM | SYSTOLIC BLOOD PRESSURE: 120 MMHG | TEMPERATURE: 96.1 F | BODY MASS INDEX: 33.23 KG/M2

## 2021-08-27 DIAGNOSIS — N94.89 ENDOMETRIAL MASS: Primary | ICD-10-CM

## 2021-08-27 DIAGNOSIS — R22.0 FACIAL MASS: ICD-10-CM

## 2021-08-27 PROCEDURE — G0463 HOSPITAL OUTPT CLINIC VISIT: HCPCS

## 2021-08-27 PROCEDURE — 99214 OFFICE O/P EST MOD 30 MIN: CPT | Performed by: NURSE PRACTITIONER

## 2021-08-27 ASSESSMENT — PAIN SCALES - GENERAL: PAINLEVEL: NO PAIN (0)

## 2021-08-27 NOTE — NURSING NOTE
"Chief Complaint   Patient presents with     Derm Problem       Initial /74   Pulse 68   Temp (!) 96.1  F (35.6  C)   Wt 72.1 kg (159 lb)   LMP  (LMP Unknown)   SpO2 98%   BMI 33.23 kg/m   Estimated body mass index is 33.23 kg/m  as calculated from the following:    Height as of 7/28/21: 1.473 m (4' 10\").    Weight as of this encounter: 72.1 kg (159 lb).  Medication Reconciliation: complete  Mary Carmen Martinez LPN  "

## 2021-09-17 ENCOUNTER — TRANSFERRED RECORDS (OUTPATIENT)
Dept: HEALTH INFORMATION MANAGEMENT | Facility: OTHER | Age: 71
End: 2021-09-17

## 2021-09-28 NOTE — PROGRESS NOTES
[address]  [facility] [address]  Phone: [phone]  Primary Provider: Amelie Gonzales  Pre-op Performing Provider: PRADIP RODARTE      PREOPERATIVE EVALUATION:  Today's date: 9/29/2021    Ria Warren is a 70 year old female who presents for a preoperative evaluation.    Surgical Information:  Surgery/Procedure: Hysteroscopy D&C  Surgery Location: [facility]  Surgeon: Dr. Plummer   Surgery Date: 10/04/2021  Time of Surgery: TBD  Where patient plans to recover: At home with family  Fax number for surgical facility:     Type of Anesthesia Anticipated: to be determined    Assessment & Plan     The proposed surgical procedure is considered INTERMEDIATE risk.    (Z01.818) Pre-op exam  (primary encounter diagnosis)  Comment: check labs, UA, and EKG  Plan: EKG 12-lead complete w/read - (Clinic         Performed), CBC with platelets and         differential, Comprehensive metabolic panel,         *[facility]scopic [facility]            (N85.8) Uterine mass  Plan: Hysteroscopy D&C    Risks and Recommendations:  The patient has the following additional risks and recommendations for perioperative complications:   - No identified additional risk factors other than previously addressed    Medication Instructions:  Patient is on no chronic medications    RECOMMENDATION:  APPROVAL GIVEN to proceed with proposed procedure, without further diagnostic evaluation.        Subjective     HPI related to upcoming procedure: Uterine mass      Preop Questions 9/29/2021   1. Have you ever had a heart attack or stroke? No   2. Have you ever had surgery on your heart or blood vessels, such as a stent placement, a coronary artery bypass, or surgery on an artery in your head, neck, heart, or legs? No   3. Do you have chest pain with activity? No   4. Do you have a history of  heart failure? No   5. Do you currently have a cold, bronchitis or  symptoms of other infection? No   6. Do you have a cough, shortness of breath, or wheezing? No   7. Do you or anyone in your family have previous history of blood clots? No   8. Do you or does anyone in your family have a serious bleeding problem such as prolonged bleeding following surgeries or cuts? No   9. Have you ever had problems with anemia or been told to take iron pills? YES -    10. Have you had any abnormal blood loss such as black, tarry or bloody stools, or abnormal vaginal bleeding? No   11. Have you ever had a blood transfusion? No   12. Are you willing to have a blood transfusion if it is medically needed before, during, or after your surgery? Yes   13. Have you or any of your relatives ever had problems with anesthesia? YES - mother   14. Do you have sleep apnea, excessive snoring or daytime drowsiness? No   15. Do you have any artifical heart valves or other implanted medical devices like a pacemaker, defibrillator, or continuous glucose monitor? No   16. Do you have artificial joints? No   17. Are you allergic to latex? No     Health Care Directive:  Patient does not have a Health Care Directive or Living Will: Discussed advance care planning with patient; however, patient declined at this time.    Preoperative Review of :   reviewed - no record of controlled substances prescribed.      Status of Chronic Conditions:  See problem list for active medical problems.  Problems all longstanding and stable, except as noted/documented.  See ROS for pertinent symptoms related to these conditions.      Review of Systems  CONSTITUTIONAL: NEGATIVE for fever, chills, change in weight  INTEGUMENTARY/SKIN: NEGATIVE for worrisome rashes, moles or lesions  EYES: NEGATIVE for vision changes or irritation  ENT/MOUTH: NEGATIVE for ear, mouth and throat problems  RESP: NEGATIVE for significant cough or SOB  CV: NEGATIVE for chest pain, palpitations or peripheral edema  GI: NEGATIVE for nausea, abdominal pain,  heartburn, or change in bowel habits  : NEGATIVE for frequency, dysuria, or hematuria  MUSCULOSKELETAL: NEGATIVE for significant arthralgias or myalgia  NEURO: NEGATIVE for weakness, dizziness or paresthesias  ENDOCRINE: NEGATIVE for temperature intolerance, skin/hair changes  HEME: NEGATIVE for bleeding problems  PSYCHIATRIC: NEGATIVE for changes in mood or affect    Patient Active Problem List    Diagnosis Date Noted     OME (otitis media with effusion), right 2020     Priority: Medium     Chronic sinusitis, unspecified location 2020     Priority: Medium     Special screening for malignant neoplasms, colon 2017     Priority: Medium     Irritable bowel syndrome 2017     Priority: Medium     Seasonal allergic rhinitis 2017     Priority: Medium     Multiple thyroid nodules 2015     Priority: Medium     Overview:   Seen by Endocrine 4/15, recommend annual exam and ultrasound       KIYA (generalized anxiety disorder) 2015     Priority: Medium     Atrophic vaginitis 2012     Priority: Medium      Past Medical History:   Diagnosis Date     Asymptomatic menopausal state     No Comments Provided     Bitten by cat     ,Hospitalized     Encounter for full-term uncomplicated delivery      2, Para 1with 1 spontaneous      Encounter for screening for malignant neoplasm of colon     No Comments Provided     Generalized anxiety disorder     No Comments Provided     Low back pain     10/10/2006,Right. Under investigation     Lower abdominal pain     10/10/2006,mid to lower quadrant. Under investigation     Nontoxic goiter     No Comments Provided     Other chest pain     No Comments Provided     Past Surgical History:   Procedure Laterality Date     COLONOSCOPY      2012     COLONOSCOPY  2017,follow up  tubular adenoma     OTHER SURGICAL HISTORY      38890.0,PAST SURGICAL HISTORY, Hospitalized for cat bite~ Lymph node resection  "x 3 at left axilla for benign disease~Notes prior complications from anesthesia:     TONSILLECTOMY      No Comments Provided     No current outpatient medications on file.       Allergies   Allergen Reactions     Azithromycin      Other reaction(s): Vomiting     Penicillins      Other reaction(s): *Unknown  Any \"cillins\" Skin burn from inside out /told to not use          Social History     Tobacco Use     Smoking status: Never Smoker     Smokeless tobacco: Never Used   Substance Use Topics     Alcohol use: Yes     Alcohol/week: 1.0 standard drinks     Types: 1 Glasses of wine per week     Family History   Problem Relation Age of Onset     Breast Cancer Mother         Cancer-breast,50's     Colon Cancer Maternal Grandfather         Cancer-colon,Colon     Diabetes Maternal Grandfather         Diabetes     Heart Failure Maternal Grandmother         Heart failure     Other - See Comments Other         Maternal side heart disease     Thyroid Disease Other         Thyroid Disease,multiple women with goiters     Asthma Father      Asthma Paternal Uncle      Allergies Daughter      Blood Disease No family hx of         Blood Disease,no blood clotting disorders     Ovarian Cancer No family hx of         Cancer-ovarian     History   Drug Use Unknown     Comment: Drug use: No         Objective     /86   Pulse 65   Temp 97.6  F (36.4  C)   Wt 72.6 kg (160 lb)   LMP  (LMP Unknown)   SpO2 98%   BMI 33.44 kg/m      Physical Exam    GENERAL APPEARANCE: healthy, alert and no distress     EYES: EOMI, PERRL     HENT: ear canals and TM's normal and nose and mouth without ulcers or lesions     NECK: no adenopathy, no asymmetry, masses, or scars and thyroid normal to palpation     RESP: lungs clear to auscultation - no rales, rhonchi or wheezes     CV: regular rates and rhythm, normal S1 S2, no S3 or S4 and no murmur, click or rub     ABDOMEN:  soft, nontender, no HSM or masses and bowel sounds normal     MS: extremities " normal- no gross deformities noted, no evidence of inflammation in joints, FROM in all extremities.     SKIN: no suspicious lesions or rashes     NEURO: Normal strength and tone, sensory exam grossly normal, mentation intact and speech normal     PSYCH: mentation appears normal. and affect normal/bright     LYMPHATICS: No cervical adenopathy      Diagnostics:  Recent Results (from the past 24 hour(s))   Comprehensive metabolic panel    Collection Time: 09/29/21 11:08 AM   Result Value Ref Range    Sodium 140 133 - 144 mmol/L    Potassium 4.0 3.4 - 5.3 mmol/L    Chloride 106 94 - 109 mmol/L    Carbon Dioxide (CO2) 31 20 - 32 mmol/L    Anion Gap 3 3 - 14 mmol/L    Urea Nitrogen 15 7 - 30 mg/dL    Creatinine 0.68 0.52 - 1.04 mg/dL    Calcium 9.1 8.5 - 10.1 mg/dL    Glucose 81 70 - 99 mg/dL    Alkaline Phosphatase 78 40 - 150 U/L    AST 20 0 - 45 U/L    ALT 23 0 - 50 U/L    Protein Total 7.5 6.8 - 8.8 g/dL    Albumin 3.8 3.4 - 5.0 g/dL    Bilirubin Total 0.6 0.2 - 1.3 mg/dL    GFR Estimate 88 >60 mL/min/1.73m2   CBC with platelets and differential    Collection Time: 09/29/21 11:08 AM   Result Value Ref Range    WBC Count 6.8 4.0 - 11.0 10e3/uL    RBC Count 4.13 3.80 - 5.20 10e6/uL    Hemoglobin 13.0 11.7 - 15.7 g/dL    Hematocrit 39.4 35.0 - 47.0 %    MCV 95 78 - 100 fL    MCH 31.5 26.5 - 33.0 pg    MCHC 33.0 31.5 - 36.5 g/dL    RDW 13.5 10.0 - 15.0 %    Platelet Count 233 150 - 450 10e3/uL    % Neutrophils 62 %    % Lymphocytes 29 %    % Monocytes 8 %    % Eosinophils 1 %    % Basophils 0 %    Absolute Neutrophils 4.3 1.6 - 8.3 10e3/uL    Absolute Lymphocytes 2.0 0.8 - 5.3 10e3/uL    Absolute Monocytes 0.5 0.0 - 1.3 10e3/uL    Absolute Eosinophils 0.1 0.0 - 0.7 10e3/uL    Absolute Basophils 0.0 0.0 - 0.2 10e3/uL   *UA reflex to Microscopic and Culture - [facility]    Collection Time: 09/29/21 11:14 AM    Specimen: Urine, Clean Catch   Result Value Ref Range    Color Urine Yellow Colorless, Straw, Light Yellow,  Yellow    Appearance Urine Clear Clear    Glucose Urine Negative Negative mg/dL    Bilirubin Urine Negative Negative    Ketones Urine Negative Negative mg/dL    Specific Gravity Urine 1.025 1.003 - 1.035    Blood Urine Small (A) Negative    pH Urine 6.0 5.0 - 7.0    Protein Albumin Urine Negative Negative mg/dL    Urobilinogen Urine 0.2 0.2, 1.0 E.U./dL    Nitrite Urine Negative Negative    Leukocyte Esterase Urine Negative Negative   Urine Microscopic    Collection Time: 09/29/21 11:14 AM   Result Value Ref Range    RBC Urine 0-2 0-2 /HPF /HPF    WBC Urine 0-5 0-5 /HPF /HPF    Squamous Epithelials Urine Few (A) None Seen /LPF      EKG: appears normal, NSR, left axis deviation. No ST change. , no LVH by voltage criteria   No change from previous EKG. Last EKG in 4/2021 had left axis deviation     Revised Cardiac Risk Index (RCRI):  The patient has the following serious cardiovascular risks for perioperative complications:   - No serious cardiac risks = 0 points     RCRI Interpretation: 0 points: Class I (very low risk - 0.4% complication rate)      Signed Electronically by: Emmy Barrett NP  Copy of this evaluation report is provided to requesting physician.

## 2021-09-29 ENCOUNTER — OFFICE VISIT (OUTPATIENT)
Dept: FAMILY MEDICINE | Facility: OTHER | Age: 71
End: 2021-09-29
Attending: NURSE PRACTITIONER
Payer: MEDICARE

## 2021-09-29 VITALS
DIASTOLIC BLOOD PRESSURE: 86 MMHG | HEART RATE: 65 BPM | SYSTOLIC BLOOD PRESSURE: 134 MMHG | TEMPERATURE: 97.6 F | BODY MASS INDEX: 33.44 KG/M2 | WEIGHT: 160 LBS | OXYGEN SATURATION: 98 %

## 2021-09-29 DIAGNOSIS — N85.8 UTERINE MASS: ICD-10-CM

## 2021-09-29 DIAGNOSIS — R31.29 MICROSCOPIC HEMATURIA: ICD-10-CM

## 2021-09-29 DIAGNOSIS — Z01.818 PREOP GENERAL PHYSICAL EXAM: ICD-10-CM

## 2021-09-29 DIAGNOSIS — Z01.818 PRE-OP EXAM: Primary | ICD-10-CM

## 2021-09-29 LAB
ALBUMIN SERPL-MCNC: 3.8 G/DL (ref 3.4–5)
ALBUMIN UR-MCNC: NEGATIVE MG/DL
ALP SERPL-CCNC: 78 U/L (ref 40–150)
ALT SERPL W P-5'-P-CCNC: 23 U/L (ref 0–50)
ANION GAP SERPL CALCULATED.3IONS-SCNC: 3 MMOL/L (ref 3–14)
APPEARANCE UR: CLEAR
AST SERPL W P-5'-P-CCNC: 20 U/L (ref 0–45)
BASOPHILS # BLD AUTO: 0 10E3/UL (ref 0–0.2)
BASOPHILS NFR BLD AUTO: 0 %
BILIRUB SERPL-MCNC: 0.6 MG/DL (ref 0.2–1.3)
BILIRUB UR QL STRIP: NEGATIVE
BUN SERPL-MCNC: 15 MG/DL (ref 7–30)
CALCIUM SERPL-MCNC: 9.1 MG/DL (ref 8.5–10.1)
CHLORIDE BLD-SCNC: 106 MMOL/L (ref 94–109)
CO2 SERPL-SCNC: 31 MMOL/L (ref 20–32)
COLOR UR AUTO: YELLOW
CREAT SERPL-MCNC: 0.68 MG/DL (ref 0.52–1.04)
EOSINOPHIL # BLD AUTO: 0.1 10E3/UL (ref 0–0.7)
EOSINOPHIL NFR BLD AUTO: 1 %
ERYTHROCYTE [DISTWIDTH] IN BLOOD BY AUTOMATED COUNT: 13.5 % (ref 10–15)
GFR SERPL CREATININE-BSD FRML MDRD: 88 ML/MIN/1.73M2
GLUCOSE BLD-MCNC: 81 MG/DL (ref 70–99)
GLUCOSE UR STRIP-MCNC: NEGATIVE MG/DL
HCT VFR BLD AUTO: 39.4 % (ref 35–47)
HGB BLD-MCNC: 13 G/DL (ref 11.7–15.7)
HGB UR QL STRIP: ABNORMAL
KETONES UR STRIP-MCNC: NEGATIVE MG/DL
LEUKOCYTE ESTERASE UR QL STRIP: NEGATIVE
LYMPHOCYTES # BLD AUTO: 2 10E3/UL (ref 0.8–5.3)
LYMPHOCYTES NFR BLD AUTO: 29 %
MCH RBC QN AUTO: 31.5 PG (ref 26.5–33)
MCHC RBC AUTO-ENTMCNC: 33 G/DL (ref 31.5–36.5)
MCV RBC AUTO: 95 FL (ref 78–100)
MONOCYTES # BLD AUTO: 0.5 10E3/UL (ref 0–1.3)
MONOCYTES NFR BLD AUTO: 8 %
NEUTROPHILS # BLD AUTO: 4.3 10E3/UL (ref 1.6–8.3)
NEUTROPHILS NFR BLD AUTO: 62 %
NITRATE UR QL: NEGATIVE
PH UR STRIP: 6 [PH] (ref 5–7)
PLATELET # BLD AUTO: 233 10E3/UL (ref 150–450)
POTASSIUM BLD-SCNC: 4 MMOL/L (ref 3.4–5.3)
PROT SERPL-MCNC: 7.5 G/DL (ref 6.8–8.8)
RBC # BLD AUTO: 4.13 10E6/UL (ref 3.8–5.2)
RBC #/AREA URNS AUTO: ABNORMAL /HPF
SODIUM SERPL-SCNC: 140 MMOL/L (ref 133–144)
SP GR UR STRIP: 1.02 (ref 1–1.03)
SQUAMOUS #/AREA URNS AUTO: ABNORMAL /LPF
UROBILINOGEN UR STRIP-ACNC: 0.2 E.U./DL
WBC # BLD AUTO: 6.8 10E3/UL (ref 4–11)
WBC #/AREA URNS AUTO: ABNORMAL /HPF

## 2021-09-29 PROCEDURE — 99214 OFFICE O/P EST MOD 30 MIN: CPT | Performed by: NURSE PRACTITIONER

## 2021-09-29 PROCEDURE — 81015 MICROSCOPIC EXAM OF URINE: CPT | Mod: ZL | Performed by: NURSE PRACTITIONER

## 2021-09-29 PROCEDURE — G0463 HOSPITAL OUTPT CLINIC VISIT: HCPCS

## 2021-09-29 PROCEDURE — 85025 COMPLETE CBC W/AUTO DIFF WBC: CPT | Mod: ZL | Performed by: NURSE PRACTITIONER

## 2021-09-29 PROCEDURE — 80053 COMPREHEN METABOLIC PANEL: CPT | Mod: ZL | Performed by: NURSE PRACTITIONER

## 2021-09-29 PROCEDURE — 93005 ELECTROCARDIOGRAM TRACING: CPT

## 2021-09-29 PROCEDURE — 36415 COLL VENOUS BLD VENIPUNCTURE: CPT | Mod: ZL | Performed by: NURSE PRACTITIONER

## 2021-09-29 PROCEDURE — 93010 ELECTROCARDIOGRAM REPORT: CPT | Performed by: INTERNAL MEDICINE

## 2021-09-29 ASSESSMENT — PAIN SCALES - GENERAL: PAINLEVEL: NO PAIN (0)

## 2021-09-29 NOTE — NURSING NOTE
"Chief Complaint   Patient presents with     Pre-Op Exam       Initial /86   Pulse 65   Temp 97.6  F (36.4  C)   Wt 72.6 kg (160 lb)   LMP  (LMP Unknown)   SpO2 98%   BMI 33.44 kg/m   Estimated body mass index is 33.44 kg/m  as calculated from the following:    Height as of 7/28/21: 1.473 m (4' 10\").    Weight as of this encounter: 72.6 kg (160 lb).  Medication Reconciliation: complete  Mary Carmen Martinez LPN  "

## 2021-09-29 NOTE — PATIENT INSTRUCTIONS

## 2021-10-01 ENCOUNTER — OFFICE VISIT (OUTPATIENT)
Dept: FAMILY MEDICINE | Facility: OTHER | Age: 71
End: 2021-10-01
Attending: ORTHOPAEDIC SURGERY
Payer: MEDICARE

## 2021-10-01 ENCOUNTER — TELEPHONE (OUTPATIENT)
Dept: FAMILY MEDICINE | Facility: OTHER | Age: 71
End: 2021-10-01

## 2021-10-01 DIAGNOSIS — Z01.818 PRE-OP EXAM: Primary | ICD-10-CM

## 2021-10-01 PROCEDURE — U0003 INFECTIOUS AGENT DETECTION BY NUCLEIC ACID (DNA OR RNA); SEVERE ACUTE RESPIRATORY SYNDROME CORONAVIRUS 2 (SARS-COV-2) (CORONAVIRUS DISEASE [COVID-19]), AMPLIFIED PROBE TECHNIQUE, MAKING USE OF HIGH THROUGHPUT TECHNOLOGIES AS DESCRIBED BY CMS-2020-01-R: HCPCS | Mod: ZL

## 2021-10-01 NOTE — TELEPHONE ENCOUNTER
Faxed preop Surgery 10/4/21 Dr Kavitha [facility] fx# [phone] & [phone]  fxd phong,med list, H & P 9/29/21 Emmy Barrett,NP,labs, EKG's  Dx: procedure / Hysteroscopy D & C   Lilliam Blue

## 2021-10-03 ENCOUNTER — TELEPHONE (OUTPATIENT)
Dept: FAMILY MEDICINE | Facility: OTHER | Age: 71
End: 2021-10-03

## 2021-10-03 DIAGNOSIS — R31.9 HEMATURIA, UNSPECIFIED TYPE: Primary | ICD-10-CM

## 2021-10-03 LAB — SARS-COV-2 RNA RESP QL NAA+PROBE: NEGATIVE

## 2021-10-03 NOTE — TELEPHONE ENCOUNTER
Re lab note: [facility]  Normal labs and baseline EKG. She has a small amount of blood in her urine. Can you ask her if this has been followed up on. If it hasn't I recommend her seeing urology. Please advise.       Patient would like a referral to see Urology,  She is willing to see Dr. Mckinney.

## 2021-10-04 ENCOUNTER — TRANSFERRED RECORDS (OUTPATIENT)
Dept: HEALTH INFORMATION MANAGEMENT | Facility: OTHER | Age: 71
End: 2021-10-04

## 2021-10-05 DIAGNOSIS — R31.9 HEMATURIA, UNSPECIFIED TYPE: Primary | ICD-10-CM

## 2021-10-28 DIAGNOSIS — R31.9 HEMATURIA: Primary | ICD-10-CM

## 2021-10-29 ENCOUNTER — LAB (OUTPATIENT)
Dept: LAB | Facility: OTHER | Age: 71
End: 2021-10-29
Payer: MEDICARE

## 2021-10-29 DIAGNOSIS — R31.9 HEMATURIA: ICD-10-CM

## 2021-10-29 DIAGNOSIS — R31.9 HEMATURIA, UNSPECIFIED TYPE: ICD-10-CM

## 2021-10-29 LAB
ALBUMIN UR-MCNC: NEGATIVE MG/DL
APPEARANCE UR: CLEAR
BILIRUB UR QL STRIP: NEGATIVE
COLOR UR AUTO: YELLOW
GLUCOSE UR STRIP-MCNC: NEGATIVE MG/DL
HGB UR QL STRIP: ABNORMAL
KETONES UR STRIP-MCNC: NEGATIVE MG/DL
LEUKOCYTE ESTERASE UR QL STRIP: NEGATIVE
NITRATE UR QL: NEGATIVE
PH UR STRIP: 5.5 [PH] (ref 5–7)
RBC #/AREA URNS AUTO: NORMAL /HPF
SP GR UR STRIP: 1.02 (ref 1–1.03)
UROBILINOGEN UR STRIP-ACNC: 0.2 E.U./DL
WBC #/AREA URNS AUTO: NORMAL /HPF

## 2021-10-29 PROCEDURE — 88112 CYTOPATH CELL ENHANCE TECH: CPT | Mod: TC

## 2021-10-29 PROCEDURE — 81015 MICROSCOPIC EXAM OF URINE: CPT | Mod: ZL

## 2021-10-29 PROCEDURE — 87086 URINE CULTURE/COLONY COUNT: CPT | Mod: ZL

## 2021-10-30 LAB — BACTERIA UR CULT: NORMAL

## 2021-11-02 ENCOUNTER — TRANSFERRED RECORDS (OUTPATIENT)
Dept: HEALTH INFORMATION MANAGEMENT | Facility: OTHER | Age: 71
End: 2021-11-02
Payer: MEDICARE

## 2021-11-02 LAB
PATH REPORT.COMMENTS IMP SPEC: NORMAL
PATH REPORT.FINAL DX SPEC: NORMAL
PATH REPORT.GROSS SPEC: NORMAL
PATH REPORT.RELEVANT HX SPEC: NORMAL

## 2021-11-02 PROCEDURE — 88112 CYTOPATH CELL ENHANCE TECH: CPT | Mod: 26 | Performed by: PATHOLOGY

## 2022-02-08 ENCOUNTER — OFFICE VISIT (OUTPATIENT)
Dept: FAMILY MEDICINE | Facility: OTHER | Age: 72
End: 2022-02-08
Attending: NURSE PRACTITIONER
Payer: MEDICARE

## 2022-02-08 VITALS
BODY MASS INDEX: 34.21 KG/M2 | SYSTOLIC BLOOD PRESSURE: 120 MMHG | WEIGHT: 163.7 LBS | DIASTOLIC BLOOD PRESSURE: 80 MMHG | OXYGEN SATURATION: 98 % | TEMPERATURE: 97 F | RESPIRATION RATE: 18 BRPM | HEART RATE: 64 BPM

## 2022-02-08 DIAGNOSIS — M54.50 ACUTE BILATERAL LOW BACK PAIN WITHOUT SCIATICA: ICD-10-CM

## 2022-02-08 DIAGNOSIS — J20.9 ACUTE BRONCHITIS WITH SYMPTOMS > 10 DAYS: ICD-10-CM

## 2022-02-08 DIAGNOSIS — R53.83 FATIGUE, UNSPECIFIED TYPE: ICD-10-CM

## 2022-02-08 DIAGNOSIS — R07.9 CHEST PAIN, UNSPECIFIED TYPE: ICD-10-CM

## 2022-02-08 DIAGNOSIS — E55.9 VITAMIN D DEFICIENCY, UNSPECIFIED: ICD-10-CM

## 2022-02-08 DIAGNOSIS — R06.02 SOB (SHORTNESS OF BREATH): Primary | ICD-10-CM

## 2022-02-08 PROCEDURE — G0463 HOSPITAL OUTPT CLINIC VISIT: HCPCS | Mod: 25 | Performed by: NURSE PRACTITIONER

## 2022-02-08 PROCEDURE — 93005 ELECTROCARDIOGRAM TRACING: CPT | Performed by: NURSE PRACTITIONER

## 2022-02-08 PROCEDURE — 99214 OFFICE O/P EST MOD 30 MIN: CPT | Mod: 25 | Performed by: NURSE PRACTITIONER

## 2022-02-08 PROCEDURE — 93010 ELECTROCARDIOGRAM REPORT: CPT | Performed by: INTERNAL MEDICINE

## 2022-02-08 RX ORDER — AZITHROMYCIN 250 MG/1
TABLET, FILM COATED ORAL
Qty: 6 TABLET | Refills: 0 | Status: SHIPPED | OUTPATIENT
Start: 2022-02-08 | End: 2022-03-16

## 2022-02-08 RX ORDER — PROGESTERONE 100 MG/1
CAPSULE ORAL
COMMUNITY
Start: 2021-11-02 | End: 2022-03-16

## 2022-02-08 ASSESSMENT — PAIN SCALES - GENERAL: PAINLEVEL: MILD PAIN (2)

## 2022-02-08 NOTE — PROGRESS NOTES
Assessment & Plan     Her sand sensation she is feeling in her head may be from her middle ear and sinuses. I will treat her for bronchitis today.     Cardiac and respiratory work up with her history of SOB and intermittent chest pain. Will await results for intervention. Increase in symptoms she should go to ER.    Labs, XRAY, and EKG pending with no lab or XRAY in [place] today.     She's bene shoveling a lot of heavy snow and likely the cause of her lower back pain. No red flags on exam.     I encouraged her to complete a total hysterectomy that was recommended to her by gynecology with her history of a endometrial mass.     (R06.02) SOB (shortness of breath)  (primary encounter diagnosis)  Comment: check EKG, treadmill stress test, echo, PFT's, chest XRAY, and labs   Plan: EKG 12-lead complete w/read - (Clinic         Performed), CBC with platelets and         differential, NM MPI Treadmill, General PFT Lab        (Please always keep checked), Pulmonary         Function Test, Leadless EKG Monitor 3 to 7         Days, Echocardiogram Complete, XR CHEST 2 VW         (Clinic Performed)            (R53.83) Fatigue, unspecified type  Comment: check labs, treadmill stress test, echo, and EKG   Plan: Vitamin D Deficiency, Comprehensive metabolic         panel, CBC with platelets and differential, NM         MPI Treadmill, Leadless EKG Monitor 3 to 7         Days, Echocardiogram Complete            (E55.9) Vitamin D deficiency, unspecified   Comment: check lab   Plan: Vitamin D Deficiency            (J20.9) Acute bronchitis with symptoms > 10 days  Comment: treat with ABX   Plan: azithromycin (ZITHROMAX Z-BETTY) 250 MG tablet            (R07.9) Chest pain, unspecified type  Comment: stress test, EKG, echocardiogram   Plan: NM MPI Treadmill, Leadless EKG Monitor 3 to 7         Days, Echocardiogram Complete            (M54.50) Acute bilateral low back pain without sciatica  Comment: check XRAY   Plan: XR LUMBAR SPINE G/E  4 VW (Clinic Performed)            See Patient Instructions    Return in about 1 month (around 3/8/2022) for SOB follow up .    Emmy Barrett NP  [facility] - [facility] [place]ins is a 71 year old who presents for the following health issues     HPI     Patient on the fence as to weather or not she needs a total hysterectomy      Concern - Sand shifting in head   Onset: 6-6/2021    Description:   Sensation of sand shifting in head    Intensity: mild    Progression of Symptoms:  same    Accompanying Signs & Symptoms:      Previous history of similar problem:   Ear effusions and chronic sinusitis     Precipitating factors:   Worsened by: None     Alleviating factors:  Improved by: none    Therapies Tried and outcome: None      Back Pain      Duration: Ongoing since shoveling heavy snow         Specific cause: none    Description:   Location of pain: low back both  Character of pain: dull ache  Pain radiation:none  New numbness or weakness in legs, not attributed to pain:  no     Intensity: Currently 2/10    History:   Pain interferes with job: Not applicable  History of back problems: no prior back problems  Any previous MRI or X-rays: Yes- at [facility].  Date: US 6/28. CT 7/28. Patient states that they found growth on uterus - had uterine tumor removed 10/2021 and they recommend to patient total hysterectomy at follow up 11/2022. Patient stated that she was never called   Sees a specialist for back pain:  No  Therapies tried without relief: Patient states that surgery relieved some pain  Denies loss of bowel or bladder control  Denies saddle paresthesia     Acute Illness///Lung pain, especially when outside cold air   Acute illness concerns: Strep   Onset: over the past week     Fever: YES-     Chills/Sweats: no     Headache (location?): no     Sinus Pressure:YES    Conjunctivitis:  no    Ear Pain: YES: left    Rhinorrhea: no     Congestion: no     Sore Throat: YES     Cough:  YES-non-productive    Wheeze: no    Decreased Appetite: no     Nausea: no    Vomiting: no    Diarrhea:  no    Dysuria/Freq.: no    Fatigue/Achiness: YES-     Sick/Strep Exposure: no      Therapies Tried and outcome: None     She's been having SOB and intermittent chest pain with exertion. Pain is to the left side of her chest. Denies any chest today during clinic visit.     Review of Systems   Constitutional, HEENT, cardiovascular, pulmonary, GI, , musculoskeletal, neuro, skin, endocrine and psych systems are negative, except as otherwise noted.      Objective    /80   Pulse 64   Temp 97  F (36.1  C)   Resp 18   Wt 74.3 kg (163 lb 11.2 oz)   LMP  (LMP Unknown)   SpO2 98%   BMI 34.21 kg/m    Body mass index is 34.21 kg/m .  Physical Exam   GENERAL: healthy, alert and no distress  HENT: ear canals and TM's normal, nose and mouth without ulcers or lesions  NECK: no adenopathy, no asymmetry, masses, or scars and thyroid normal to palpation  RESP: no rales or wheezes. +rhonchi   CV: regular rate and rhythm, normal S1 S2, no S3 or S4, no murmur, click or rub, no peripheral edema and peripheral pulses strong  ABDOMEN: soft, nontender, no hepatosplenomegaly, no masses and bowel sounds normal  MS: no gross musculoskeletal defects noted, no edema. No step offs or TTP to spinal column. Pain is reproducible to L5-S1 paraspinal muscles bilaterally. Tenderness to tailbone region also. No rash, erythema, bruising, or warmth to the touch to posterior back. Distal pulses intact. Straight leg raises intact   SKIN: no suspicious lesions or rashes  NEURO: Normal strength and tone, mentation intact and speech normal  PSYCH: mentation appears normal, affect normal/bright    Labs and XRAY's pending for future. No lab or XRAY in [place] today.

## 2022-02-08 NOTE — NURSING NOTE
"No chief complaint on file.      Initial /80   Pulse 64   Temp 97  F (36.1  C)   Resp 18   Wt 74.3 kg (163 lb 11.2 oz)   LMP  (LMP Unknown)   SpO2 98%   BMI 34.21 kg/m   Estimated body mass index is 34.21 kg/m  as calculated from the following:    Height as of 7/28/21: 1.473 m (4' 10\").    Weight as of this encounter: 74.3 kg (163 lb 11.2 oz).  Medication Reconciliation: emmie Dee    "

## 2022-02-08 NOTE — PATIENT INSTRUCTIONS
Patient Education     Possible Causes of Low Back or Leg Pain    BIG: The symptoms in your back or leg may be due to pressure on a nerve. This pressure may be caused by a damaged disk or by abnormal bone growth. Either way, you may feel pain, burning, tingling, or numbness. If you have pressure on a nerve that connects to the sciatic nerve, pain may shoot down your leg.    Pressure from the disk  Constant wear and tear can weaken a disk over time and cause back pain. The disk can then be damaged by a sudden movement or injury. If its soft center starts to bulge, the disk may press on a nerve. Or the outside of the disk may tear, and the soft center may squeeze through and pinch a nerve.    Pressure from bone  As a disk wears out, the vertebrae right above and below the disk start to touch. This can put pressure on a nerve. Often, abnormal bone (called bone spurs) grows where the vertebrae rub against each other. This can cause the foramen or the spinal canal to narrow (called stenosis) and press against a nerve.  [employer] last reviewed this educational content on 3/1/2018    9337-1442 [employer]. All rights reserved. This information is not intended as a substitute for professional medical care. Always follow your healthcare professional's instructions.     Patient Education     Shortness of Breath (Dyspnea)  Shortness of breath is the feeling that you can't catch your breath or get enough air. It is also known as dyspnea.  Dyspnea can be caused by many different conditions. They include:    Acute asthma attack    Worsening of chronic lung diseases such as chronic bronchitis and emphysema    Heart failure. This is when weak heart muscle allows extra fluid to collect in the lungs.    Panic attacks or anxiety. Fear can cause rapid breathing (hyperventilation).    Pneumonia, or an infection in the lung tissue    Exposure to toxic substances, fumes, smoke, or certain medicines    Blood clot in the lung  (pulmonary embolism). This is often from a piece of blood clot in a deep vein of the leg (deep vein thrombosis) that breaks off and travels to the lungs.    Heart attack or heart-related chest pain (angina)    Anemia    Collapsed lung (pneumothorax)    Dehydration    Pregnancy  Based on your visit today, the exact cause of your shortness of breath is not certain. Your tests don t show any of the serious causes of dyspnea. You may need other tests to find out if you have a serious problem. It s important to watch for any new symptoms or symptoms that get worse. Follow up with your healthcare provider as directed.  Home care  Follow these tips to take care of yourself at home:    When your symptoms are better, go back to your usual activities.    If you smoke, you should stop. Join a quit-smoking program or ask your healthcare provider for help.    Eat a healthy diet and get plenty of sleep.    Get regular exercise. Talk with your healthcare provider before starting to exercise, especially if you have other medical problems.    Cut down on the amount of caffeine and stimulants you consume.  Follow-up care  Follow up with your healthcare provider, or as advised.  If tests were done, you will be told if your treatment needs to be changed. You can call as directed for the results.  If an X-ray was taken, a specialist will review it. You will be notified of any new findings that may affect your care.  Call 911  Shortness of breath may be a sign of a serious medical problem. For example, it may be a problem with your heart or lungs. Call 911 if you have worsening shortness of breath or trouble breathing, especially with any of the symptoms below:    Confusion or difficulty waking    Fainting or loss of consciousness.    Fast or irregular heartbeat    Coughing up blood    Pain in your chest, arm, shoulder, neck, or upper back    Sweating  When to seek medical advice  Call your healthcare provider right away if any of these  occur:    Slight shortness of breath or wheezing    Redness, pain or swelling in your leg, arm, or other body area    Swelling in both legs or ankles    Fast weight gain    Dizziness or weakness    Fever of 100.4 F (38 C) or higher, or as directed by your healthcare provider  [employer] last reviewed this educational content on 6/1/2018 2000-2021 [employer]. All rights reserved. This information is not intended as a substitute for professional medical care. Always follow your healthcare professional's instructions.    Patient Education     Bronchitis, Antibiotic Treatment (Adult)    Bronchitis is an infection of the air passages (bronchial tubes) in your lungs. It often occurs when you have a cold. This illness is contagious during the first few days and is spread through the air by coughing and sneezing, or by direct contact (touching the sick person and then touching your own eyes, nose, or mouth).  Symptoms of bronchitis include cough with mucus (phlegm) and low-grade fever. Bronchitis usually lasts 7 to 14 days. Mild cases can be treated with simple home remedies. More severe infection is treated with an antibiotic.  Home care  Follow these guidelines when caring for yourself at home:  If your symptoms are severe, rest at home for the first 2 to 3 days. When you go back to your usual activities, don't let yourself get too tired.  Don't smoke. Also stay away from secondhand smoke.  You may use over-the-counter medicines to control fever or pain, unless another medicine was prescribed. If you have chronic liver or kidney disease or have ever had a stomach ulcer or gastrointestinal bleeding, talk with your healthcare provider before using these medicines. Also talk to your provider if you are taking medicine to prevent blood clots. Aspirin should never be given to anyone younger than 18 who is ill with a viral infection or fever. It may cause severe liver or brain damage.  Your appetite may be low,  so a light diet is fine. Stay well hydrated by drinking 6 to 8 glasses of fluids per day. This includes water, soft drinks, sports drinks, juices, tea, or soup. Extra fluids will help loosen mucus in your nose and lungs.  Over-the-counter cough, cold, and sore-throat medicines will not shorten the length of the illness, but they may be helpful to reduce your symptoms. Don't use decongestants if you have high blood pressure.  Finish all antibiotic medicine. Do this even if you are feeling better after only a few days.  Follow-up care  Follow up with your healthcare provider, or as advised. If you had an X-ray or ECG (electrocardiogram), a specialist will review it. You will be told of any new test results that may affect your care.  If you are age 65 or older, if you smoke, or if you have a chronic lung disease or condition that affects your immune system, ask your healthcare provider about getting a pneumococcal vaccine and a yearly flu shot (influenza vaccine).  When to seek medical advice  Call your healthcare provider right away if any of these occur:  Fever of 100.4 F (38 C) or higher, or as directed by your healthcare provider  Coughing up more sputum  Weakness, drowsiness, headache, facial pain, ear pain, or a stiff neck  Call 911  Call 911 if any of these occur.  Coughing up blood  Weakness, drowsiness, headache, or stiff neck that get worse  Trouble breathing, wheezing, or pain with breathing  [employer] last reviewed this educational content on 6/1/2018 2000-2021 [employer]. All rights reserved. This information is not intended as a substitute for professional medical care. Always follow your healthcare professional's instructions.    Take Zyrtec nightly for 10 days.   Follow up if not improving.

## 2022-02-24 ENCOUNTER — HOSPITAL ENCOUNTER (OUTPATIENT)
Dept: NUCLEAR MEDICINE | Facility: HOSPITAL | Age: 72
Setting detail: NUCLEAR MEDICINE
End: 2022-02-24
Attending: NURSE PRACTITIONER
Payer: MEDICARE

## 2022-02-24 ENCOUNTER — HOSPITAL ENCOUNTER (OUTPATIENT)
Dept: CARDIOLOGY | Facility: HOSPITAL | Age: 72
Setting detail: NUCLEAR MEDICINE
End: 2022-02-24
Attending: NURSE PRACTITIONER
Payer: MEDICARE

## 2022-02-24 DIAGNOSIS — R53.83 FATIGUE, UNSPECIFIED TYPE: ICD-10-CM

## 2022-02-24 DIAGNOSIS — R06.02 SOB (SHORTNESS OF BREATH): ICD-10-CM

## 2022-02-24 DIAGNOSIS — R07.9 CHEST PAIN, UNSPECIFIED TYPE: ICD-10-CM

## 2022-02-24 LAB
CV BLOOD PRESSURE: 83 MMHG
CV STRESS MAX HR HE: 117
NUC STRESS EJECTION FRACTION: 76 %
RATE PRESSURE PRODUCT: NORMAL
STRESS ECHO BASELINE DIASTOLIC HE: 80
STRESS ECHO BASELINE HR: 80 BPM
STRESS ECHO BASELINE SYSTOLIC BP: 140
STRESS ECHO CALCULATED PERCENT HR: 79 %
STRESS ECHO LAST STRESS DIASTOLIC BP: 80
STRESS ECHO LAST STRESS SYSTOLIC BP: 180
STRESS ECHO POST ESTIMATED WORKLOAD: 4.6 METS
STRESS ECHO POST EXERCISE DUR MIN: 2 MIN
STRESS ECHO POST EXERCISE DUR SEC: 45 SEC
STRESS ECHO TARGET HR: 149

## 2022-02-24 PROCEDURE — 343N000001 HC RX 343: Performed by: RADIOLOGY

## 2022-02-24 PROCEDURE — A9500 TC99M SESTAMIBI: HCPCS | Performed by: RADIOLOGY

## 2022-02-24 PROCEDURE — 93016 CV STRESS TEST SUPVJ ONLY: CPT | Performed by: INTERNAL MEDICINE

## 2022-02-24 PROCEDURE — 258N000003 HC RX IP 258 OP 636: Performed by: INTERNAL MEDICINE

## 2022-02-24 PROCEDURE — 93018 CV STRESS TEST I&R ONLY: CPT | Mod: ME | Performed by: INTERNAL MEDICINE

## 2022-02-24 PROCEDURE — G1004 CDSM NDSC: HCPCS

## 2022-02-24 PROCEDURE — 93017 CV STRESS TEST TRACING ONLY: CPT | Performed by: INTERNAL MEDICINE

## 2022-02-24 RX ORDER — SODIUM CHLORIDE 9 MG/ML
INJECTION, SOLUTION INTRAVENOUS ONCE
Status: COMPLETED | OUTPATIENT
Start: 2022-02-24 | End: 2022-02-24

## 2022-02-24 RX ADMIN — SODIUM CHLORIDE: 9 INJECTION, SOLUTION INTRAVENOUS at 09:24

## 2022-02-24 RX ADMIN — Medication 31.2 MILLICURIE: at 09:31

## 2022-02-24 RX ADMIN — Medication 10.7 MILLICURIE: at 07:16

## 2022-03-01 ENCOUNTER — HOSPITAL ENCOUNTER (OUTPATIENT)
Dept: CARDIOLOGY | Facility: HOSPITAL | Age: 72
End: 2022-03-01
Attending: NURSE PRACTITIONER
Payer: MEDICARE

## 2022-03-01 DIAGNOSIS — R07.9 CHEST PAIN, UNSPECIFIED TYPE: ICD-10-CM

## 2022-03-01 DIAGNOSIS — R53.83 FATIGUE, UNSPECIFIED TYPE: ICD-10-CM

## 2022-03-01 DIAGNOSIS — R06.02 SOB (SHORTNESS OF BREATH): ICD-10-CM

## 2022-03-01 PROCEDURE — 93244 EXT ECG>48HR<7D REV&INTERPJ: CPT | Performed by: INTERNAL MEDICINE

## 2022-03-01 PROCEDURE — 93306 TTE W/DOPPLER COMPLETE: CPT | Mod: 26 | Performed by: INTERNAL MEDICINE

## 2022-03-01 PROCEDURE — 93306 TTE W/DOPPLER COMPLETE: CPT

## 2022-03-01 PROCEDURE — 93242 EXT ECG>48HR<7D RECORDING: CPT

## 2022-03-02 ENCOUNTER — TELEPHONE (OUTPATIENT)
Dept: FAMILY MEDICINE | Facility: OTHER | Age: 72
End: 2022-03-02
Payer: MEDICARE

## 2022-03-02 DIAGNOSIS — Z13.9 SCREENING FOR CONDITION: Primary | ICD-10-CM

## 2022-03-08 NOTE — PROGRESS NOTES
"  Assessment & Plan     (R06.02) SOB (shortness of breath)  (primary encounter diagnosis)  Comment: check chest XRAY and PFT. Sleep study pending   Plan: XR CHEST 2 VW (Clinic Performed), General PFT         Lab (Please always keep checked), Pulmonary         Function Test            (R53.83) Fatigue, unspecified type  Comment: check labs   Plan: TSH with free T4 reflex              (L98.9) Scalp lesion  Comment: ref to Dr. Brown for further evaluation and treatment   Plan: Adult General Surg Referral           (E55.9) Vitamin D deficiency  Comment: check vitamin D level   Plan: vit D level pending         BMI:   Estimated body mass index is 34.78 kg/m  as calculated from the following:    Height as of 7/28/21: 1.473 m (4' 10\").    Weight as of this encounter: 75.5 kg (166 lb 6.4 oz).   Weight management plan: Discussed healthy diet and exercise guidelines    See Patient Instructions    Return if symptoms worsen or fail to improve.    Emmy Barrett NP  [facility]    Irasema Rollins is a 71 year old who presents for the following health issues     HPI     Concern - SOB follow up   Onset: 1/2022  Description: SOB on exertion   Intensity: mild  Progression of Symptoms:  same  Accompanying Signs & Symptoms: Fatigue, chest pain   Previous history of similar problem: None   Precipitating factors:        Worsened by: Outside - patient does not know if its temperature or exacerbation   Alleviating factors:        Improved by: Unknown   Therapies tried and outcome:  none     Scalp lesions X3. Increasing in size and becoming painful.     Review of Systems   Constitutional, HEENT, cardiovascular, pulmonary, GI, , musculoskeletal, neuro, skin, endocrine and psych systems are negative, except as otherwise noted.      Objective    /70   Pulse 70   Temp 97.4  F (36.3  C)   Resp 18   Wt 75.5 kg (166 lb 6.4 oz)   LMP  (LMP Unknown)   SpO2 97%   BMI 34.78 kg/m    Body mass index " is 34.78 kg/m .  Physical Exam   GENERAL: healthy, alert and no distress  NECK: no adenopathy, no asymmetry, masses, or scars and thyroid normal to palpation  RESP: lungs clear to auscultation - no rales, rhonchi or wheezes  CV: regular rate and rhythm, normal S1 S2, no S3 or S4, no murmur, click or rub, no peripheral edema and peripheral pulses strong  MS: no gross musculoskeletal defects noted, no edema  SKIN: no suspicious lesions or rashes. +firm scalp lesions X3 (cyst) without drainage or warmth to the touch   NEURO: Normal strength and tone, mentation intact and speech normal  PSYCH: mentation appears normal, affect normal/bright

## 2022-03-09 ENCOUNTER — OFFICE VISIT (OUTPATIENT)
Dept: FAMILY MEDICINE | Facility: OTHER | Age: 72
End: 2022-03-09
Attending: NURSE PRACTITIONER
Payer: MEDICARE

## 2022-03-09 ENCOUNTER — ANCILLARY PROCEDURE (OUTPATIENT)
Dept: GENERAL RADIOLOGY | Facility: OTHER | Age: 72
End: 2022-03-09
Attending: NURSE PRACTITIONER
Payer: MEDICARE

## 2022-03-09 VITALS
SYSTOLIC BLOOD PRESSURE: 132 MMHG | HEART RATE: 70 BPM | RESPIRATION RATE: 18 BRPM | DIASTOLIC BLOOD PRESSURE: 70 MMHG | OXYGEN SATURATION: 97 % | WEIGHT: 166.4 LBS | TEMPERATURE: 97.4 F | BODY MASS INDEX: 34.78 KG/M2

## 2022-03-09 DIAGNOSIS — R53.83 FATIGUE, UNSPECIFIED TYPE: ICD-10-CM

## 2022-03-09 DIAGNOSIS — L98.9 SCALP LESION: ICD-10-CM

## 2022-03-09 DIAGNOSIS — R06.02 SOB (SHORTNESS OF BREATH): Primary | ICD-10-CM

## 2022-03-09 DIAGNOSIS — R06.02 SOB (SHORTNESS OF BREATH): ICD-10-CM

## 2022-03-09 DIAGNOSIS — E55.9 VITAMIN D DEFICIENCY: ICD-10-CM

## 2022-03-09 LAB
BASOPHILS # BLD AUTO: 0 10E3/UL (ref 0–0.2)
BASOPHILS NFR BLD AUTO: 0 %
EOSINOPHIL # BLD AUTO: 0.1 10E3/UL (ref 0–0.7)
EOSINOPHIL NFR BLD AUTO: 1 %
ERYTHROCYTE [DISTWIDTH] IN BLOOD BY AUTOMATED COUNT: 13.5 % (ref 10–15)
HCT VFR BLD AUTO: 38.5 % (ref 35–47)
HGB BLD-MCNC: 12.4 G/DL (ref 11.7–15.7)
LYMPHOCYTES # BLD AUTO: 2.5 10E3/UL (ref 0.8–5.3)
LYMPHOCYTES NFR BLD AUTO: 35 %
MCH RBC QN AUTO: 30.6 PG (ref 26.5–33)
MCHC RBC AUTO-ENTMCNC: 32.2 G/DL (ref 31.5–36.5)
MCV RBC AUTO: 95 FL (ref 78–100)
MONOCYTES # BLD AUTO: 0.5 10E3/UL (ref 0–1.3)
MONOCYTES NFR BLD AUTO: 7 %
NEUTROPHILS # BLD AUTO: 4 10E3/UL (ref 1.6–8.3)
NEUTROPHILS NFR BLD AUTO: 57 %
PLATELET # BLD AUTO: 240 10E3/UL (ref 150–450)
RBC # BLD AUTO: 4.05 10E6/UL (ref 3.8–5.2)
WBC # BLD AUTO: 7.1 10E3/UL (ref 4–11)

## 2022-03-09 PROCEDURE — 82306 VITAMIN D 25 HYDROXY: CPT | Mod: ZL | Performed by: NURSE PRACTITIONER

## 2022-03-09 PROCEDURE — 36415 COLL VENOUS BLD VENIPUNCTURE: CPT | Mod: ZL | Performed by: NURSE PRACTITIONER

## 2022-03-09 PROCEDURE — 80053 COMPREHEN METABOLIC PANEL: CPT | Mod: ZL | Performed by: NURSE PRACTITIONER

## 2022-03-09 PROCEDURE — 84443 ASSAY THYROID STIM HORMONE: CPT | Mod: ZL | Performed by: NURSE PRACTITIONER

## 2022-03-09 PROCEDURE — 99214 OFFICE O/P EST MOD 30 MIN: CPT | Performed by: NURSE PRACTITIONER

## 2022-03-09 PROCEDURE — 85025 COMPLETE CBC W/AUTO DIFF WBC: CPT | Mod: ZL | Performed by: NURSE PRACTITIONER

## 2022-03-09 PROCEDURE — 82040 ASSAY OF SERUM ALBUMIN: CPT | Mod: ZL | Performed by: NURSE PRACTITIONER

## 2022-03-09 PROCEDURE — G0463 HOSPITAL OUTPT CLINIC VISIT: HCPCS | Mod: 25

## 2022-03-09 PROCEDURE — 71046 X-RAY EXAM CHEST 2 VIEWS: CPT | Mod: TC,FY

## 2022-03-09 ASSESSMENT — PAIN SCALES - GENERAL: PAINLEVEL: MILD PAIN (2)

## 2022-03-09 NOTE — NURSING NOTE
"Chief Complaint   Patient presents with     Breathing Problem       Initial /70   Pulse 70   Temp 97.4  F (36.3  C)   Resp 18   Wt 75.5 kg (166 lb 6.4 oz)   LMP  (LMP Unknown)   SpO2 97%   BMI 34.78 kg/m   Estimated body mass index is 34.78 kg/m  as calculated from the following:    Height as of 7/28/21: 1.473 m (4' 10\").    Weight as of this encounter: 75.5 kg (166 lb 6.4 oz).  Medication Reconciliation: emmie Dee  "

## 2022-03-09 NOTE — PATIENT INSTRUCTIONS
Patient Education     Shortness of Breath (Dyspnea)  Shortness of breath is the feeling that you can't catch your breath or get enough air. It is also known as dyspnea.  Dyspnea can be caused by many different conditions. They include:    Acute asthma attack    Worsening of chronic lung diseases such as chronic bronchitis and emphysema    Heart failure. This is when weak heart muscle allows extra fluid to collect in the lungs.    Panic attacks or anxiety. Fear can cause rapid breathing (hyperventilation).    Pneumonia, or an infection in the lung tissue    Exposure to toxic substances, fumes, smoke, or certain medicines    Blood clot in the lung (pulmonary embolism). This is often from a piece of blood clot in a deep vein of the leg (deep vein thrombosis) that breaks off and travels to the lungs.    Heart attack or heart-related chest pain (angina)    Anemia    Collapsed lung (pneumothorax)    Dehydration    Pregnancy  Based on your visit today, the exact cause of your shortness of breath is not certain. Your tests don t show any of the serious causes of dyspnea. You may need other tests to find out if you have a serious problem. It s important to watch for any new symptoms or symptoms that get worse. Follow up with your healthcare provider as directed.  Home care  Follow these tips to take care of yourself at home:    When your symptoms are better, go back to your usual activities.    If you smoke, you should stop. Join a quit-smoking program or ask your healthcare provider for help.    Eat a healthy diet and get plenty of sleep.    Get regular exercise. Talk with your healthcare provider before starting to exercise, especially if you have other medical problems.    Cut down on the amount of caffeine and stimulants you consume.  Follow-up care  Follow up with your healthcare provider, or as advised.  If tests were done, you will be told if your treatment needs to be changed. You can call as directed for the  results.  If an X-ray was taken, a specialist will review it. You will be notified of any new findings that may affect your care.  Call 911  Shortness of breath may be a sign of a serious medical problem. For example, it may be a problem with your heart or lungs. Call 911 if you have worsening shortness of breath or trouble breathing, especially with any of the symptoms below:    Confusion or difficulty waking    Fainting or loss of consciousness.    Fast or irregular heartbeat    Coughing up blood    Pain in your chest, arm, shoulder, neck, or upper back    Sweating  When to seek medical advice  Call your healthcare provider right away if any of these occur:    Slight shortness of breath or wheezing    Redness, pain or swelling in your leg, arm, or other body area    Swelling in both legs or ankles    Fast weight gain    Dizziness or weakness    Fever of 100.4 F (38 C) or higher, or as directed by your healthcare provider  [employer] last reviewed this educational content on 6/1/2018 2000-2021 [employer]. All rights reserved. This information is not intended as a substitute for professional medical care. Always follow your healthcare professional's instructions.

## 2022-03-10 LAB
ALBUMIN SERPL-MCNC: 3.7 G/DL (ref 3.4–5)
ALP SERPL-CCNC: 79 U/L (ref 40–150)
ALT SERPL W P-5'-P-CCNC: 28 U/L (ref 0–50)
ANION GAP SERPL CALCULATED.3IONS-SCNC: 4 MMOL/L (ref 3–14)
AST SERPL W P-5'-P-CCNC: 21 U/L (ref 0–45)
BILIRUB SERPL-MCNC: 0.3 MG/DL (ref 0.2–1.3)
BUN SERPL-MCNC: 21 MG/DL (ref 7–30)
CALCIUM SERPL-MCNC: 9 MG/DL (ref 8.5–10.1)
CHLORIDE BLD-SCNC: 106 MMOL/L (ref 94–109)
CO2 SERPL-SCNC: 29 MMOL/L (ref 20–32)
CREAT SERPL-MCNC: 0.66 MG/DL (ref 0.52–1.04)
GFR SERPL CREATININE-BSD FRML MDRD: >90 ML/MIN/1.73M2
GLUCOSE BLD-MCNC: 79 MG/DL (ref 70–99)
POTASSIUM BLD-SCNC: 4.2 MMOL/L (ref 3.4–5.3)
PROT SERPL-MCNC: 7.7 G/DL (ref 6.8–8.8)
SODIUM SERPL-SCNC: 139 MMOL/L (ref 133–144)
TSH SERPL DL<=0.005 MIU/L-ACNC: 0.77 MU/L (ref 0.4–4)

## 2022-03-11 LAB — DEPRECATED CALCIDIOL+CALCIFEROL SERPL-MC: 17 UG/L (ref 20–75)

## 2022-03-14 DIAGNOSIS — E55.9 VITAMIN D DEFICIENCY, UNSPECIFIED: Primary | ICD-10-CM

## 2022-03-14 RX ORDER — CHOLECALCIFEROL (VITAMIN D3) 50 MCG
1 TABLET ORAL DAILY
Qty: 30 TABLET | Refills: 3 | Status: SHIPPED | OUTPATIENT
Start: 2022-03-14 | End: 2022-06-12

## 2022-03-14 RX ORDER — ERGOCALCIFEROL 1.25 MG/1
50000 CAPSULE, LIQUID FILLED ORAL WEEKLY
Qty: 6 CAPSULE | Refills: 0 | Status: SHIPPED | OUTPATIENT
Start: 2022-03-14 | End: 2022-05-09

## 2022-03-16 ENCOUNTER — OFFICE VISIT (OUTPATIENT)
Dept: SURGERY | Facility: OTHER | Age: 72
End: 2022-03-16
Attending: NURSE PRACTITIONER
Payer: MEDICARE

## 2022-03-16 VITALS
HEART RATE: 70 BPM | WEIGHT: 160 LBS | HEIGHT: 62 IN | TEMPERATURE: 96.9 F | DIASTOLIC BLOOD PRESSURE: 68 MMHG | SYSTOLIC BLOOD PRESSURE: 110 MMHG | BODY MASS INDEX: 29.44 KG/M2 | OXYGEN SATURATION: 98 %

## 2022-03-16 DIAGNOSIS — L98.9 SCALP LESION: ICD-10-CM

## 2022-03-16 PROCEDURE — G0463 HOSPITAL OUTPT CLINIC VISIT: HCPCS | Mod: 25

## 2022-03-16 PROCEDURE — 17110 DESTRUCTION B9 LES UP TO 14: CPT | Performed by: SURGERY

## 2022-03-16 PROCEDURE — G0463 HOSPITAL OUTPT CLINIC VISIT: HCPCS

## 2022-03-16 ASSESSMENT — PAIN SCALES - GENERAL: PAINLEVEL: NO PAIN (0)

## 2022-03-16 NOTE — NURSING NOTE
"Chief Complaint   Patient presents with     Consult For     Scalp lesion       Initial /68 (BP Location: Left arm, Patient Position: Chair, Cuff Size: Adult Regular)   Pulse 70   Temp 96.9  F (36.1  C) (Tympanic)   Ht 1.575 m (5' 2\")   Wt 72.6 kg (160 lb)   LMP  (LMP Unknown)   SpO2 98%   BMI 29.26 kg/m   Estimated body mass index is 29.26 kg/m  as calculated from the following:    Height as of this encounter: 1.575 m (5' 2\").    Weight as of this encounter: 72.6 kg (160 lb).  Medication Reconciliation: complete  JOCELIN RANGEL LPN    "

## 2022-03-17 NOTE — PATIENT INSTRUCTIONS
Thank you for allowing Dr. Brown and our surgical team to participate in your care. Please call our health unit coordinator at [phone] with scheduling questions or the nurse at [phone] with any other questions or concerns.    You will be contacted by dermatology to schedule your consultation appointment. Please call [phone] if you are not contacted in a timely manner.

## 2022-03-21 ENCOUNTER — TELEPHONE (OUTPATIENT)
Dept: FAMILY MEDICINE | Facility: OTHER | Age: 72
End: 2022-03-21
Payer: MEDICARE

## 2022-03-21 NOTE — TELEPHONE ENCOUNTER
[facility] Ob[facility] department is calling about a cardiac work up. Please contact Jayshree at [facility] at [phone].

## 2022-03-21 NOTE — PROGRESS NOTES
"[facility]  Minor Procedure Note    Preoperative diagnosis:  Scale like lesion on scalp x 3     Postoperative diagnosis:  Same     Procedure:  Cryotherapy x 3     Anesthesia:  Local    History: 71 year old year old female with history of \"skin cancer\" on her face presents with several scale like lesions on her scalp, She was concerned as she had on area that seemed to grow quite large but has since fallen off and there is no residual lesion now. She is here by way of PCP for outpatient excision/evaluation/treatment.    Findings:  Did freeze the three lesions with liquid nitrogen x 3, referral placed for dermatology follow up, would be happy to assist if residual disease or at request of dermatologist.     Tissue to pathology:    None     Details:   The requisite time out pause observed during which the patient confirmed their identity, date of birth, side and site of the requested cryotherapy.  The three areas were identified by patient and with liquid N02 each lesion was treated three times until blanches white allowed to completely thaw before treating two additional times. This was repeated for all three lesions.  The procedure was well tolerated and the patient was discharged with wound care instructions and followup referral to dermatology was made.      Abner Brown MD    "

## 2022-03-22 NOTE — TELEPHONE ENCOUNTER
Spoke with PCP, pt and Jayshree at [facility]. Patient is ok for surgery. Scheduled for surgery and preop

## 2022-04-11 NOTE — PROGRESS NOTES
[place] [address] [address]  [facility] [address]  Phone: [phone]  Primary Provider: No Ref-Primary, Physician  Pre-op Performing Provider: PRADIP RODARTE      PREOPERATIVE EVALUATION:  Today's date: 4/12/2022    Ria Warren is a 71 year old female who presents for a preoperative evaluation.    Surgical Information:  Surgery/Procedure: Hysterectomy   Surgery Location: [facility]   Surgeon: Dr. Russell per chart review. She was unsure of the name of surgeon   Surgery Date: 5/10/2022  Time of Surgery: TBD  Where patient plans to recover: At home with family  Fax number for surgical facility:     Type of Anesthesia Anticipated: to be determined    Assessment & Plan     The proposed surgical procedure is considered INTERMEDIATE risk.    (Z01.818) Preop general physical exam  (primary encounter diagnosis)  Comment: check labs, urine, and EKG   Plan: CBC with platelets and differential, EKG         12-lead complete w/read - (Clinic Performed),         Comprehensive metabolic panel, *UA reflex to         [facility]         Urine Microscopic            (R31.29) Microscopic hematuria  Comment: urine culture pending   Plan: Urine Culture Aerobic Bacterial            (N85.8) Uterine mass  Comment: check EKG  Plan: EKG 12-lead complete w/read - (Clinic         Performed)              Risks and Recommendations:  The patient has the following additional risks and recommendations for perioperative complications:   - No identified additional risk factors other than previously addressed    Medication Instructions:  Patient is to take all scheduled medications on the day of surgery EXCEPT for modifications listed below:  hold vitamins 1 week prior    RECOMMENDATION:  APPROVAL GIVEN to proceed with proposed procedure pending review of diagnostic evaluation.    CMP pending. If CMP is stable she is ok to proceed with planned surgical procedure.     Urine  culture pending.     Subjective     HPI related to upcoming procedure: Uterine mass. Pre operative examination.       Preop Questions 4/12/2022   1. Have you ever had a heart attack or stroke? No   2. Have you ever had surgery on your heart or blood vessels, such as a stent placement, a coronary artery bypass, or surgery on an artery in your head, neck, heart, or legs? No   3. Do you have chest pain with activity? YES - Intermittent with cold air. Negative stress test 2/24/2022   4. Do you have a history of  heart failure? No   5. Do you currently have a cold, bronchitis or symptoms of other infection? No   6. Do you have a cough, shortness of breath, or wheezing? YES - With Cold Air   7. Do you or anyone in your family have previous history of blood clots? No   8. Do you or does anyone in your family have a serious bleeding problem such as prolonged bleeding following surgeries or cuts? No   9. Have you ever had problems with anemia or been told to take iron pills? YES - Anemia 20 years prior   10. Have you had any abnormal blood loss such as black, tarry or bloody stools, or abnormal vaginal bleeding? No   11. Have you ever had a blood transfusion? No   12. Are you willing to have a blood transfusion if it is medically needed before, during, or after your surgery? Yes   13. Have you or any of your relatives ever had problems with anesthesia? YES - Mother    14. Do you have sleep apnea, excessive snoring or daytime drowsiness? No   15. Do you have any artifical heart valves or other implanted medical devices like a pacemaker, defibrillator, or continuous glucose monitor? No   16. Do you have artificial joints? No   17. Are you allergic to latex? No     METS 4+     Health Care Directive:  Patient does not have a Health Care Directive or Living Will: Discussed advance care planning with patient; however, patient declined at this time.    Preoperative Review of :   reviewed - no record of controlled substances  prescribed.      Status of Chronic Conditions:  See problem list for active medical problems.  Problems all longstanding and stable, except as noted/documented.  See ROS for pertinent symptoms related to these conditions.      Review of Systems  CONSTITUTIONAL: NEGATIVE for fever, chills, change in weight  INTEGUMENTARY/SKIN: NEGATIVE for worrisome rashes, moles or lesions  EYES: NEGATIVE for vision changes or irritation  ENT/MOUTH: NEGATIVE for ear, mouth and throat problems  RESP: NEGATIVE for significant cough or SOB  CV: NEGATIVE for chest pain, palpitations or peripheral edema  GI: NEGATIVE for nausea, abdominal pain, heartburn, or change in bowel habits  : NEGATIVE for frequency, dysuria, or hematuria  MUSCULOSKELETAL: NEGATIVE for significant arthralgias or myalgia  NEURO: NEGATIVE for weakness, dizziness or paresthesias  ENDOCRINE: NEGATIVE for temperature intolerance, skin/hair changes  HEME: NEGATIVE for bleeding problems  PSYCHIATRIC: NEGATIVE for changes in mood or affect    Patient Active Problem List    Diagnosis Date Noted     OME (otitis media with effusion), right 2020     Priority: Medium     Chronic sinusitis, unspecified location 2020     Priority: Medium     Special screening for malignant neoplasms, colon 2017     Priority: Medium     Irritable bowel syndrome 2017     Priority: Medium     Seasonal allergic rhinitis 2017     Priority: Medium     Multiple thyroid nodules 2015     Priority: Medium     Overview:   Seen by Endocrine 4/15, recommend annual exam and ultrasound       KIYA (generalized anxiety disorder) 2015     Priority: Medium     Atrophic vaginitis 2012     Priority: Medium      Past Medical History:   Diagnosis Date     Asymptomatic menopausal state     No Comments Provided     Bitten by cat     2004,Hospitalized     Encounter for full-term uncomplicated delivery      2, Para 1with 1 spontaneous      Encounter for  "screening for malignant neoplasm of colon     No Comments Provided     Generalized anxiety disorder     No Comments Provided     Low back pain     10/10/2006,Right. Under investigation     Lower abdominal pain     10/10/2006,mid to lower quadrant. Under investigation     Nontoxic goiter     No Comments Provided     Other chest pain     No Comments Provided     Past Surgical History:   Procedure Laterality Date     COLONOSCOPY      9/13/2012     COLONOSCOPY  05/17/2017 05/17/2017,follow up 2022 tubular adenoma     OTHER SURGICAL HISTORY      27092.0,PAST SURGICAL HISTORY,2004 Hospitalized for cat bite~1990 Lymph node resection x 3 at left axilla for benign disease~Notes prior complications from anesthesia:     TONSILLECTOMY      No Comments Provided     Current Outpatient Medications   Medication Sig Dispense Refill     vitamin D2 (ERGOCALCIFEROL) 14987 units (1250 mcg) capsule Take 1 capsule (50,000 Units) by mouth once a week for 6 doses (Patient not taking: No sig reported) 6 capsule 0     vitamin D3 (CHOLECALCIFEROL) 50 mcg (2000 units) tablet Take 1 tablet (50 mcg) by mouth daily for 90 doses (Patient not taking: No sig reported) 30 tablet 3       Allergies   Allergen Reactions     Azithromycin      Other reaction(s): Vomiting     Penicillins      Other reaction(s): *Unknown  Any \"cillins\" Skin burn from inside out /told to not use          Social History     Tobacco Use     Smoking status: Never Smoker     Smokeless tobacco: Never Used   Substance Use Topics     Alcohol use: Yes     Alcohol/week: 1.0 standard drink     Types: 1 Glasses of wine per week     Family History   Problem Relation Age of Onset     Breast Cancer Mother         Cancer-breast,50's     Colon Cancer Maternal Grandfather         Cancer-colon,Colon     Diabetes Maternal Grandfather         Diabetes     Heart Failure Maternal Grandmother         Heart failure     Other - See Comments Other         Maternal side heart disease     Thyroid " Disease Other         Thyroid Disease,multiple women with goiters     Asthma Father      Asthma Paternal Uncle      Allergies Daughter      Blood Disease No family hx of         Blood Disease,no blood clotting disorders     Ovarian Cancer No family hx of         Cancer-ovarian     History   Drug Use Unknown     Comment: Drug use: No         Objective     /60   Pulse 72   Temp 97.3  F (36.3  C)   Resp 18   Wt 75.4 kg (166 lb 4.8 oz)   LMP  (LMP Unknown)   SpO2 98%   BMI 30.42 kg/m      Physical Exam    GENERAL APPEARANCE: healthy, alert and no distress     EYES: EOMI, PERRL     HENT: ear canals and TM's normal and nose and mouth without ulcers or lesions     NECK: no adenopathy, no asymmetry, masses, or scars and thyroid normal to palpation     RESP: lungs clear to auscultation - no rales, rhonchi or wheezes     CV: regular rates and rhythm, normal S1 S2, no S3 or S4 and no murmur, click or rub     ABDOMEN:  soft, nontender, no HSM or masses and bowel sounds normal     MS: extremities normal- no gross deformities noted, no evidence of inflammation in joints, FROM in all extremities.     SKIN: no suspicious lesions or rashes     NEURO: Normal strength and tone, sensory exam grossly normal, mentation intact and speech normal     PSYCH: mentation appears normal. and affect normal/bright     LYMPHATICS: No cervical adenopathy      Diagnostics:  Recent Results (from the past 24 hour(s))   *UA reflex to Microscopic and Culture - [facility]    Collection Time: 04/12/22  3:17 PM    Specimen: Urine, Midstream   Result Value Ref Range    Color Urine Yellow Colorless, Straw, Light Yellow, Yellow    Appearance Urine Clear Clear    Glucose Urine Negative Negative mg/dL    Bilirubin Urine Negative Negative    Ketones Urine Negative Negative mg/dL    Specific Gravity Urine >=1.030 1.003 - 1.035    Blood Urine Moderate (A) Negative    pH Urine 5.0 5.0 - 7.0    Protein Albumin Urine Negative Negative mg/dL     Urobilinogen Urine 0.2 0.2, 1.0 E.U./dL    Nitrite Urine Negative Negative    Leukocyte Esterase Urine Negative Negative   CBC with platelets and differential    Collection Time: 04/12/22  3:17 PM   Result Value Ref Range    WBC Count 7.5 4.0 - 11.0 10e3/uL    RBC Count 3.93 3.80 - 5.20 10e6/uL    Hemoglobin 12.1 11.7 - 15.7 g/dL    Hematocrit 37.6 35.0 - 47.0 %    MCV 96 78 - 100 fL    MCH 30.8 26.5 - 33.0 pg    MCHC 32.2 31.5 - 36.5 g/dL    RDW 13.4 10.0 - 15.0 %    Platelet Count 237 150 - 450 10e3/uL    % Neutrophils 61 %    % Lymphocytes 32 %    % Monocytes 6 %    % Eosinophils 1 %    % Basophils 0 %    Absolute Neutrophils 4.6 1.6 - 8.3 10e3/uL    Absolute Lymphocytes 2.4 0.8 - 5.3 10e3/uL    Absolute Monocytes 0.5 0.0 - 1.3 10e3/uL    Absolute Eosinophils 0.1 0.0 - 0.7 10e3/uL    Absolute Basophils 0.0 0.0 - 0.2 10e3/uL   Urine Microscopic    Collection Time: 04/12/22  3:17 PM   Result Value Ref Range    RBC Urine 2-5 (A) 0-2 /HPF /HPF    WBC Urine 0-5 0-5 /HPF /HPF      EKG: appears normal, NSR, normal axis, normal intervals, no acute ST/T changes c/w ischemia, no LVH by voltage criteria, unchanged from previous tracings    Revised Cardiac Risk Index (RCRI):  The patient has the following serious cardiovascular risks for perioperative complications:   - No serious cardiac risks = 0 points     RCRI Interpretation: 0 points: Class I (very low risk - 0.4% complication rate)      Signed Electronically by: Emmy Barrett NP  Copy of this evaluation report is provided to requesting physician.

## 2022-04-12 ENCOUNTER — OFFICE VISIT (OUTPATIENT)
Dept: FAMILY MEDICINE | Facility: OTHER | Age: 72
End: 2022-04-12
Attending: NURSE PRACTITIONER
Payer: MEDICARE

## 2022-04-12 VITALS
WEIGHT: 166.3 LBS | RESPIRATION RATE: 18 BRPM | DIASTOLIC BLOOD PRESSURE: 60 MMHG | HEART RATE: 72 BPM | OXYGEN SATURATION: 98 % | BODY MASS INDEX: 30.42 KG/M2 | TEMPERATURE: 97.3 F | SYSTOLIC BLOOD PRESSURE: 118 MMHG

## 2022-04-12 DIAGNOSIS — Z01.818 PREOP GENERAL PHYSICAL EXAM: Primary | ICD-10-CM

## 2022-04-12 DIAGNOSIS — H92.02 CHRONIC LEFT EAR PAIN: Primary | ICD-10-CM

## 2022-04-12 DIAGNOSIS — G89.29 CHRONIC LEFT EAR PAIN: Primary | ICD-10-CM

## 2022-04-12 DIAGNOSIS — R31.29 MICROSCOPIC HEMATURIA: ICD-10-CM

## 2022-04-12 DIAGNOSIS — N85.8 UTERINE MASS: ICD-10-CM

## 2022-04-12 LAB
ALBUMIN UR-MCNC: NEGATIVE MG/DL
APPEARANCE UR: CLEAR
BASOPHILS # BLD AUTO: 0 10E3/UL (ref 0–0.2)
BASOPHILS NFR BLD AUTO: 0 %
BILIRUB UR QL STRIP: NEGATIVE
COLOR UR AUTO: YELLOW
EOSINOPHIL # BLD AUTO: 0.1 10E3/UL (ref 0–0.7)
EOSINOPHIL NFR BLD AUTO: 1 %
ERYTHROCYTE [DISTWIDTH] IN BLOOD BY AUTOMATED COUNT: 13.4 % (ref 10–15)
GLUCOSE UR STRIP-MCNC: NEGATIVE MG/DL
HCT VFR BLD AUTO: 37.6 % (ref 35–47)
HGB BLD-MCNC: 12.1 G/DL (ref 11.7–15.7)
HGB UR QL STRIP: ABNORMAL
KETONES UR STRIP-MCNC: NEGATIVE MG/DL
LEUKOCYTE ESTERASE UR QL STRIP: NEGATIVE
LYMPHOCYTES # BLD AUTO: 2.4 10E3/UL (ref 0.8–5.3)
LYMPHOCYTES NFR BLD AUTO: 32 %
MCH RBC QN AUTO: 30.8 PG (ref 26.5–33)
MCHC RBC AUTO-ENTMCNC: 32.2 G/DL (ref 31.5–36.5)
MCV RBC AUTO: 96 FL (ref 78–100)
MONOCYTES # BLD AUTO: 0.5 10E3/UL (ref 0–1.3)
MONOCYTES NFR BLD AUTO: 6 %
NEUTROPHILS # BLD AUTO: 4.6 10E3/UL (ref 1.6–8.3)
NEUTROPHILS NFR BLD AUTO: 61 %
NITRATE UR QL: NEGATIVE
PH UR STRIP: 5 [PH] (ref 5–7)
PLATELET # BLD AUTO: 237 10E3/UL (ref 150–450)
RBC # BLD AUTO: 3.93 10E6/UL (ref 3.8–5.2)
RBC #/AREA URNS AUTO: ABNORMAL /HPF
SP GR UR STRIP: >=1.03 (ref 1–1.03)
UROBILINOGEN UR STRIP-ACNC: 0.2 E.U./DL
WBC # BLD AUTO: 7.5 10E3/UL (ref 4–11)
WBC #/AREA URNS AUTO: ABNORMAL /HPF

## 2022-04-12 PROCEDURE — 99214 OFFICE O/P EST MOD 30 MIN: CPT | Performed by: NURSE PRACTITIONER

## 2022-04-12 PROCEDURE — G0463 HOSPITAL OUTPT CLINIC VISIT: HCPCS | Mod: 25

## 2022-04-12 PROCEDURE — 93010 ELECTROCARDIOGRAM REPORT: CPT | Mod: 77 | Performed by: INTERNAL MEDICINE

## 2022-04-12 PROCEDURE — 85025 COMPLETE CBC W/AUTO DIFF WBC: CPT | Mod: ZL | Performed by: NURSE PRACTITIONER

## 2022-04-12 PROCEDURE — 81001 URINALYSIS AUTO W/SCOPE: CPT | Mod: ZL | Performed by: NURSE PRACTITIONER

## 2022-04-12 PROCEDURE — 93005 ELECTROCARDIOGRAM TRACING: CPT | Performed by: NURSE PRACTITIONER

## 2022-04-12 PROCEDURE — 87086 URINE CULTURE/COLONY COUNT: CPT | Mod: ZL | Performed by: NURSE PRACTITIONER

## 2022-04-12 PROCEDURE — 80053 COMPREHEN METABOLIC PANEL: CPT | Mod: ZL | Performed by: NURSE PRACTITIONER

## 2022-04-12 PROCEDURE — 36415 COLL VENOUS BLD VENIPUNCTURE: CPT | Mod: ZL | Performed by: NURSE PRACTITIONER

## 2022-04-12 ASSESSMENT — PAIN SCALES - GENERAL: PAINLEVEL: NO PAIN (0)

## 2022-04-12 NOTE — NURSING NOTE
"Chief Complaint   Patient presents with     Pre-Op Exam       Initial /60   Pulse 72   Temp 97.3  F (36.3  C)   Resp 18   Wt 75.4 kg (166 lb 4.8 oz)   LMP  (LMP Unknown)   SpO2 98%   BMI 30.42 kg/m   Estimated body mass index is 30.42 kg/m  as calculated from the following:    Height as of 3/16/22: 1.575 m (5' 2\").    Weight as of this encounter: 75.4 kg (166 lb 4.8 oz).  Medication Reconciliation: emmie Dee  "

## 2022-04-12 NOTE — PATIENT INSTRUCTIONS
Preparing for Your Surgery  Getting started  A nurse will call you to review your health history and instructions. They will give you an arrival time based on your scheduled surgery time. Please be ready to share:    Your doctor's clinic name and phone number    Your medical, surgical and anesthesia history    A list of allergies and sensitivities    A list of medicines, including herbal treatments and over-the-counter drugs    Whether the patient has a legal guardian (ask how to send us the papers in advance)  Please tell us if you're pregnant--or if there's any chance you might be pregnant. Some surgeries may injure a fetus (unborn baby), so they require a pregnancy test. Surgeries that are safe for a fetus don't always need a test, and you can choose whether to have one.   If you have a child who's having surgery, please ask for a copy of Preparing for Your Child's Surgery.    Preparing for surgery    Within 30 days of surgery: Have a pre-op exam (sometimes called an H&P, or History and Physical). This can be done at a clinic or pre-operative center.  ? If you're having a , you may not need this exam. Talk to your care team.    At your pre-op exam, talk to your care team about all medicines you take. If you need to stop any medicines before surgery, ask when to start taking them again.  ? We do this for your safety. Many medicines can make you bleed too much during surgery. Some change how well surgery (anesthesia) drugs work.    Call your insurance company to let them know you're having surgery. (If you don't have insurance, call [phone].)    Call your clinic if there's any change in your health. This includes signs of a cold or flu (sore throat, runny nose, cough, rash, fever). It also includes a scrape or scratch near the surgery site.    If you have questions on the day of surgery, call your hospital or surgery center.  COVID testing  You may need to be tested for COVID-19 before having  surgery. If so, your surgical team will give you instructions for scheduling this test, separate from your preoperative history and physical.  Eating and drinking guidelines  For your safety: Unless your surgeon tells you otherwise, follow the guidelines below.    Eat and drink as usual until 8 hours before surgery. After that, no food or milk.    Drink clear liquids until 2 hours before surgery. These are liquids you can see through, like water, Gatorade and Propel Water. You may also have black coffee and tea (no cream or milk).    Nothing by mouth within 2 hours of surgery. This includes gum, candy and breath mints.    If you drink alcohol: Stop drinking it the night before surgery.    If your care team tells you to take medicine on the morning of surgery, it's okay to take it with a sip of water.  Preventing infection    Shower or bathe the night before and morning of your surgery. Follow the instructions your clinic gave you. (If no instructions, use regular soap.)    Don't shave or clip hair near your surgery site. We'll remove the hair if needed.    Don't smoke or vape the morning of surgery. You may chew nicotine gum up to 2 hours before surgery. A nicotine patch is okay.  ? Note: Some surgeries require you to completely quit smoking and nicotine. Check with your surgeon.    Your care team will make every effort to keep you safe from infection. We will:  ? Clean our hands often with soap and water (or an alcohol-based hand rub).  ? Clean the skin at your surgery site with a special soap that kills germs.  ? Give you a special gown to keep you warm. (Cold raises the risk of infection.)  ? Wear special hair covers, masks, gowns and gloves during surgery.  ? Give antibiotic medicine, if prescribed. Not all surgeries need antibiotics.  What to bring on the day of surgery    Photo ID and insurance card    Copy of your health care directive, if you have one    Glasses and hearing aides (bring cases)  ? You can't  wear contacts during surgery    Inhaler and eye drops, if you use them (tell us about these when you arrive)    CPAP machine or breathing device, if you use them    A few personal items, if spending the night    If you have . . .  ? A pacemaker, ICD (cardiac defibrillator) or other implant: Bring the ID card.  ? An implanted stimulator: Bring the remote control.  ? A legal guardian: Bring a copy of the certified (court-stamped) guardianship papers.  Please remove any jewelry, including body piercings. Leave jewelry and other valuables at home.  If you're going home the day of surgery    You must have a responsible adult drive you home. They should stay with you overnight as well.    If you don't have someone to stay with you, and you aren't safe to go home alone, we may keep you overnight. Insurance often won't pay for this.  After surgery  If it's hard to control your pain or you need more pain medicine, please call your surgeon's office.  Questions?   If you have any questions for your care team, list them here: _________________________________________________________________________________________________________________________________________________________________________ ____________________________________ ____________________________________ ____________________________________  For informational purposes only. Not to replace the advice of your health care provider. Copyright   2003, 2019 [facility]. All rights reserved. Clinically reviewed by Bozena Chahal MD. [place] 191313 - REV 07/21.

## 2022-04-13 LAB
ALBUMIN SERPL-MCNC: 3.5 G/DL (ref 3.4–5)
ALP SERPL-CCNC: 66 U/L (ref 40–150)
ALT SERPL W P-5'-P-CCNC: 26 U/L (ref 0–50)
ANION GAP SERPL CALCULATED.3IONS-SCNC: 6 MMOL/L (ref 3–14)
AST SERPL W P-5'-P-CCNC: 23 U/L (ref 0–45)
BILIRUB SERPL-MCNC: 0.3 MG/DL (ref 0.2–1.3)
BUN SERPL-MCNC: 19 MG/DL (ref 7–30)
CALCIUM SERPL-MCNC: 8.9 MG/DL (ref 8.5–10.1)
CHLORIDE BLD-SCNC: 108 MMOL/L (ref 94–109)
CO2 SERPL-SCNC: 27 MMOL/L (ref 20–32)
CREAT SERPL-MCNC: 0.74 MG/DL (ref 0.52–1.04)
GFR SERPL CREATININE-BSD FRML MDRD: 86 ML/MIN/1.73M2
GLUCOSE BLD-MCNC: 112 MG/DL (ref 70–99)
POTASSIUM BLD-SCNC: 3.5 MMOL/L (ref 3.4–5.3)
PROT SERPL-MCNC: 7.3 G/DL (ref 6.8–8.8)
SODIUM SERPL-SCNC: 141 MMOL/L (ref 133–144)

## 2022-04-14 DIAGNOSIS — H91.93 DECREASED HEARING OF BOTH EARS: Primary | ICD-10-CM

## 2022-04-15 LAB — BACTERIA UR CULT: NORMAL

## 2022-05-09 DIAGNOSIS — E55.9 VITAMIN D DEFICIENCY, UNSPECIFIED: ICD-10-CM

## 2022-05-09 RX ORDER — ERGOCALCIFEROL 1.25 MG/1
CAPSULE, LIQUID FILLED ORAL
Qty: 6 CAPSULE | Refills: 0 | Status: SHIPPED | OUTPATIENT
Start: 2022-05-09 | End: 2022-05-10

## 2022-05-10 ENCOUNTER — TRANSFERRED RECORDS (OUTPATIENT)
Dept: HEALTH INFORMATION MANAGEMENT | Facility: HOSPITAL | Age: 72
End: 2022-05-10

## 2022-05-10 DIAGNOSIS — E55.9 VITAMIN D DEFICIENCY, UNSPECIFIED: ICD-10-CM

## 2022-05-10 RX ORDER — ERGOCALCIFEROL 1.25 MG/1
CAPSULE, LIQUID FILLED ORAL
Qty: 12 CAPSULE | Refills: 3 | Status: SHIPPED | OUTPATIENT
Start: 2022-05-10 | End: 2023-07-05

## 2022-05-10 NOTE — TELEPHONE ENCOUNTER
Vit d       Last Written Prescription Date:  5-9-22  Last Fill Quantity: 6,   # refills: 0  Last Office Visit: 4-12-22  Future Office visit:       Requests 90 day supply

## 2022-05-24 ENCOUNTER — TRANSFERRED RECORDS (OUTPATIENT)
Dept: FAMILY MEDICINE | Facility: OTHER | Age: 72
End: 2022-05-24

## 2022-05-24 ENCOUNTER — HOSPITAL ENCOUNTER (OUTPATIENT)
Dept: RESPIRATORY THERAPY | Facility: HOSPITAL | Age: 72
Discharge: HOME | End: 2022-05-24
Attending: NURSE PRACTITIONER | Admitting: INTERNAL MEDICINE
Payer: MEDICARE

## 2022-05-24 DIAGNOSIS — R06.02 SOB (SHORTNESS OF BREATH): ICD-10-CM

## 2022-05-24 LAB — HGB BLD-MCNC: 12.7 G/DL (ref 11.7–15.7)

## 2022-05-24 PROCEDURE — 94010 BREATHING CAPACITY TEST: CPT | Mod: 26 | Performed by: INTERNAL MEDICINE

## 2022-05-24 PROCEDURE — 94010 BREATHING CAPACITY TEST: CPT

## 2022-05-24 PROCEDURE — 36415 COLL VENOUS BLD VENIPUNCTURE: CPT | Performed by: NURSE PRACTITIONER

## 2022-05-24 PROCEDURE — 94729 DIFFUSING CAPACITY: CPT | Mod: 26 | Performed by: INTERNAL MEDICINE

## 2022-05-24 PROCEDURE — 94726 PLETHYSMOGRAPHY LUNG VOLUMES: CPT

## 2022-05-24 PROCEDURE — 94726 PLETHYSMOGRAPHY LUNG VOLUMES: CPT | Mod: 26 | Performed by: INTERNAL MEDICINE

## 2022-05-24 PROCEDURE — 94729 DIFFUSING CAPACITY: CPT

## 2022-05-24 PROCEDURE — 85018 HEMOGLOBIN: CPT | Performed by: NURSE PRACTITIONER

## 2022-06-13 ENCOUNTER — TRANSFERRED RECORDS (OUTPATIENT)
Dept: HEALTH INFORMATION MANAGEMENT | Facility: CLINIC | Age: 72
End: 2022-06-13

## 2023-07-05 ENCOUNTER — OFFICE VISIT (OUTPATIENT)
Dept: FAMILY MEDICINE | Facility: OTHER | Age: 73
End: 2023-07-05
Attending: NURSE PRACTITIONER
Payer: MEDICARE

## 2023-07-05 VITALS
DIASTOLIC BLOOD PRESSURE: 78 MMHG | SYSTOLIC BLOOD PRESSURE: 138 MMHG | BODY MASS INDEX: 32.78 KG/M2 | HEIGHT: 59 IN | WEIGHT: 162.6 LBS | HEART RATE: 66 BPM | TEMPERATURE: 97.8 F | RESPIRATION RATE: 18 BRPM | OXYGEN SATURATION: 98 %

## 2023-07-05 DIAGNOSIS — R19.7 DIARRHEA, UNSPECIFIED TYPE: ICD-10-CM

## 2023-07-05 DIAGNOSIS — R10.817 GENERALIZED ABDOMINAL TENDERNESS, REBOUND TENDERNESS PRESENCE NOT SPECIFIED: ICD-10-CM

## 2023-07-05 DIAGNOSIS — R10.11 RUQ ABDOMINAL PAIN: ICD-10-CM

## 2023-07-05 DIAGNOSIS — Z12.11 SCREENING FOR COLON CANCER: ICD-10-CM

## 2023-07-05 DIAGNOSIS — Z00.00 ENCOUNTER FOR MEDICARE ANNUAL WELLNESS EXAM: ICD-10-CM

## 2023-07-05 DIAGNOSIS — F41.1 GAD (GENERALIZED ANXIETY DISORDER): Primary | ICD-10-CM

## 2023-07-05 DIAGNOSIS — R53.83 FATIGUE, UNSPECIFIED TYPE: ICD-10-CM

## 2023-07-05 DIAGNOSIS — R22.1 NECK SWELLING: ICD-10-CM

## 2023-07-05 DIAGNOSIS — E04.2 MULTIPLE THYROID NODULES: ICD-10-CM

## 2023-07-05 DIAGNOSIS — M79.10 MUSCLE ACHE: ICD-10-CM

## 2023-07-05 LAB
ALBUMIN SERPL BCG-MCNC: 4.2 G/DL (ref 3.5–5.2)
ALBUMIN UR-MCNC: NEGATIVE MG/DL
ALP SERPL-CCNC: 63 U/L (ref 35–104)
ALT SERPL W P-5'-P-CCNC: 13 U/L (ref 0–50)
ANION GAP SERPL CALCULATED.3IONS-SCNC: 12 MMOL/L (ref 7–15)
APPEARANCE UR: CLEAR
AST SERPL W P-5'-P-CCNC: 21 U/L (ref 0–45)
BASOPHILS # BLD AUTO: 0 10E3/UL (ref 0–0.2)
BASOPHILS NFR BLD AUTO: 0 %
BILIRUB SERPL-MCNC: 0.4 MG/DL
BILIRUB UR QL STRIP: NEGATIVE
BUN SERPL-MCNC: 13.5 MG/DL (ref 8–23)
CALCIUM SERPL-MCNC: 9.7 MG/DL (ref 8.8–10.2)
CHLORIDE SERPL-SCNC: 101 MMOL/L (ref 98–107)
CHOLEST SERPL-MCNC: 200 MG/DL
CK SERPL-CCNC: 86 U/L (ref 26–192)
COLOR UR AUTO: YELLOW
CREAT SERPL-MCNC: 0.7 MG/DL (ref 0.51–0.95)
CRP SERPL-MCNC: <3 MG/L
DEPRECATED HCO3 PLAS-SCNC: 27 MMOL/L (ref 22–29)
EOSINOPHIL # BLD AUTO: 0 10E3/UL (ref 0–0.7)
EOSINOPHIL NFR BLD AUTO: 1 %
ERYTHROCYTE [DISTWIDTH] IN BLOOD BY AUTOMATED COUNT: 13 % (ref 10–15)
GFR SERPL CREATININE-BSD FRML MDRD: >90 ML/MIN/1.73M2
GLUCOSE SERPL-MCNC: 84 MG/DL (ref 70–99)
GLUCOSE UR STRIP-MCNC: NEGATIVE MG/DL
HCT VFR BLD AUTO: 39.3 % (ref 35–47)
HDLC SERPL-MCNC: 69 MG/DL
HGB BLD-MCNC: 12.7 G/DL (ref 11.7–15.7)
HGB UR QL STRIP: ABNORMAL
IMM GRANULOCYTES # BLD: 0 10E3/UL
IMM GRANULOCYTES NFR BLD: 0 %
KETONES UR STRIP-MCNC: NEGATIVE MG/DL
LDLC SERPL CALC-MCNC: 116 MG/DL
LEUKOCYTE ESTERASE UR QL STRIP: ABNORMAL
LIPASE SERPL-CCNC: 30 U/L (ref 13–60)
LYMPHOCYTES # BLD AUTO: 2 10E3/UL (ref 0.8–5.3)
LYMPHOCYTES NFR BLD AUTO: 33 %
MCH RBC QN AUTO: 29.8 PG (ref 26.5–33)
MCHC RBC AUTO-ENTMCNC: 32.3 G/DL (ref 31.5–36.5)
MCV RBC AUTO: 92 FL (ref 78–100)
MONOCYTES # BLD AUTO: 0.4 10E3/UL (ref 0–1.3)
MONOCYTES NFR BLD AUTO: 7 %
MUCOUS THREADS #/AREA URNS LPF: PRESENT /LPF
NEUTROPHILS # BLD AUTO: 3.7 10E3/UL (ref 1.6–8.3)
NEUTROPHILS NFR BLD AUTO: 60 %
NITRATE UR QL: NEGATIVE
NONHDLC SERPL-MCNC: 131 MG/DL
PH UR STRIP: 6 [PH] (ref 5–7)
PLATELET # BLD AUTO: 239 10E3/UL (ref 150–450)
POTASSIUM SERPL-SCNC: 4.2 MMOL/L (ref 3.4–5.3)
PROT SERPL-MCNC: 7.2 G/DL (ref 6.4–8.3)
RBC # BLD AUTO: 4.26 10E6/UL (ref 3.8–5.2)
RBC #/AREA URNS AUTO: ABNORMAL /HPF
SODIUM SERPL-SCNC: 140 MMOL/L (ref 136–145)
SP GR UR STRIP: 1.02 (ref 1–1.03)
SQUAMOUS #/AREA URNS AUTO: ABNORMAL /LPF
TRIGL SERPL-MCNC: 74 MG/DL
TSH SERPL DL<=0.005 MIU/L-ACNC: 0.42 UIU/ML (ref 0.3–4.2)
UROBILINOGEN UR STRIP-ACNC: 0.2 E.U./DL
WBC # BLD AUTO: 6.2 10E3/UL (ref 4–11)
WBC #/AREA URNS AUTO: ABNORMAL /HPF

## 2023-07-05 PROCEDURE — 93005 ELECTROCARDIOGRAM TRACING: CPT | Performed by: NURSE PRACTITIONER

## 2023-07-05 PROCEDURE — 80053 COMPREHEN METABOLIC PANEL: CPT | Mod: ZL | Performed by: NURSE PRACTITIONER

## 2023-07-05 PROCEDURE — 84443 ASSAY THYROID STIM HORMONE: CPT | Mod: ZL | Performed by: NURSE PRACTITIONER

## 2023-07-05 PROCEDURE — 86140 C-REACTIVE PROTEIN: CPT | Mod: ZL | Performed by: NURSE PRACTITIONER

## 2023-07-05 PROCEDURE — G0463 HOSPITAL OUTPT CLINIC VISIT: HCPCS

## 2023-07-05 PROCEDURE — 85004 AUTOMATED DIFF WBC COUNT: CPT | Mod: ZL | Performed by: NURSE PRACTITIONER

## 2023-07-05 PROCEDURE — 82550 ASSAY OF CK (CPK): CPT | Mod: ZL | Performed by: NURSE PRACTITIONER

## 2023-07-05 PROCEDURE — 80061 LIPID PANEL: CPT | Mod: ZL,GZ | Performed by: NURSE PRACTITIONER

## 2023-07-05 PROCEDURE — 81003 URINALYSIS AUTO W/O SCOPE: CPT | Mod: ZL | Performed by: NURSE PRACTITIONER

## 2023-07-05 PROCEDURE — 93010 ELECTROCARDIOGRAM REPORT: CPT | Mod: 77 | Performed by: INTERNAL MEDICINE

## 2023-07-05 PROCEDURE — 83690 ASSAY OF LIPASE: CPT | Mod: ZL | Performed by: NURSE PRACTITIONER

## 2023-07-05 PROCEDURE — 81001 URINALYSIS AUTO W/SCOPE: CPT | Mod: ZL | Performed by: NURSE PRACTITIONER

## 2023-07-05 PROCEDURE — G0439 PPPS, SUBSEQ VISIT: HCPCS | Performed by: NURSE PRACTITIONER

## 2023-07-05 PROCEDURE — 36415 COLL VENOUS BLD VENIPUNCTURE: CPT | Mod: ZL | Performed by: NURSE PRACTITIONER

## 2023-07-05 ASSESSMENT — ENCOUNTER SYMPTOMS
SHORTNESS OF BREATH: 1
NERVOUS/ANXIOUS: 0
COUGH: 1
EYE PAIN: 1
DYSURIA: 0
HEMATURIA: 0
SORE THROAT: 1
NAUSEA: 1
CHILLS: 1
JOINT SWELLING: 0
CONSTIPATION: 0
PALPITATIONS: 1
PARESTHESIAS: 0
DIARRHEA: 1
ABDOMINAL PAIN: 1
BREAST MASS: 0
FREQUENCY: 1
FEVER: 1
DIZZINESS: 1
HEADACHES: 1
WEAKNESS: 1
HEARTBURN: 1
ARTHRALGIAS: 0
MYALGIAS: 1

## 2023-07-05 ASSESSMENT — PAIN SCALES - GENERAL: PAINLEVEL: NO PAIN (0)

## 2023-07-05 ASSESSMENT — ACTIVITIES OF DAILY LIVING (ADL): CURRENT_FUNCTION: NO ASSISTANCE NEEDED

## 2023-07-05 NOTE — PATIENT INSTRUCTIONS
Patient Education   Personalized Prevention Plan  You are due for the preventive services outlined below.  Your care team is available to assist you in scheduling these services.  If you have already completed any of these items, please share that information with your care team to update in your medical record.  Health Maintenance Due   Topic Date Due     Zoster (Shingles) Vaccine (1 of 2) Never done     Pneumococcal Vaccine (1 - PCV) Never done     COVID-19 Vaccine (3 - Pfizer series) 09/20/2021     Colorectal Cancer Screening  05/17/2022     PHQ-2 (once per calendar year)  01/01/2023     Mammogram  04/16/2023     Your Health Risk Assessment indicates you feel you are not in good health    A healthy lifestyle helps keep the body fit and the mind alert. It helps protect you from disease, helps you fight disease, and helps prevent chronic disease (disease that doesn't go away) from getting worse. This is important as you get older and begin to notice twinges in muscles and joints and a decline in the strength and stamina you once took for granted. A healthy lifestyle includes good healthcare, good nutrition, weight control, recreation, and regular exercise. Avoid harmful substances and do what you can to keep safe. Another part of a healthy lifestyle is stay mentally active and socially involved.    Good healthcare     Have a wellness visit every year.     If you have new symptoms, let us know right away. Don't wait until the next checkup.     Take medicines exactly as prescribed and keep your medicines in a safe place. Tell us if your medicine causes problems.   Healthy diet and weight control     Eat 3 or 4 small, nutritious, low-fat, high-fiber meals a day. Include a variety of fruits, vegetables, and whole-grain foods.     Make sure you get enough calcium in your diet. Calcium, vitamin D, and exercise help prevent osteoporosis (bone thinning).     If you live alone, try eating with others when you can. That  way you get a good meal and have company while you eat it.     Try to keep a healthy weight. If you eat more calories than your body uses for energy, it will be stored as fat and you will gain weight.     Recreation   Recreation is not limited to sports and team events. It includes any activity that provides relaxation, interest, enjoyment, and exercise. Recreation provides an outlet for physical, mental, and social energy. It can give a sense of worth and achievement. It can help you stay healthy.    Mental Exercise and Social Involvement  Mental and emotional health is as important as physical health. Keep in touch with friends and family. Stay as active as possible. Continue to learn and challenge yourself.   Things you can do to stay mentally active are:    Learn something new, like a foreign language or musical instrument.     Play SCRABBLE or do crossword puzzles. If you cannot find people to play these games with you at home, you can play them with others on your computer through the Internet.     Join a games club--anything from card games to chess or checkers or lawn bowling.     Start a new hobby.     Go back to school.     Volunteer.     Read.   Keep up with world events.    Understanding USDA MyPlate  The USDA has guidelines to help you make healthy food choices. These are called MyPlate. MyPlate shows the food groups that make up healthy meals using the image of a place setting. Before you eat, think about the healthiest choices for what to put on your plate or in your cup or bowl. To learn more about building a healthy plate, visit www.myplate.gov.     The food groups    Fruits. Any fruit or 100% fruit juice counts as part of the Fruit Group. Fruits may be fresh, canned, frozen, or dried, and may be whole, cut-up, or pureed. Make 1/2 of your plate fruits and vegetables.    Vegetables. Any vegetable or 100% vegetable juice counts as a member of the Vegetable Group. Vegetables may be fresh, frozen,  canned, or dried. They can be served raw or cooked and may be whole, cut-up, or mashed. Make 1/2 of your plate fruits and vegetables.    Grains. All foods made from grains are part of the Grains Group. These include wheat, rice, oats, cornmeal, and barley. Grains are often used to make foods such as bread, pasta, oatmeal, cereal, tortillas, and grits. Grains should be no more than 1/4 of your plate. At least half of your grains should be whole grains.    Protein. This group includes meat, poultry, seafood, beans and peas, eggs, processed soy products (such as tofu), nuts (including nut butters), and seeds. Make protein choices no more than 1/4 of your plate. Meat and poultry choices should be lean or low fat.    Dairy. The Dairy Group includes all fluid milk products and foods made from milk that contain calcium, such as yogurt and cheese. (Foods that have little calcium, such as cream, butter, and cream cheese, are not part of this group.) Most dairy choices should be low-fat or fat-free.  Things to limit  Eating healthy also means limiting these things in your diet:    Oils. Oils aren't a food group, but they do contain essential nutrients. These are fats that are liquid at room temperature. Including small amounts of oils in your diet is fine and can even be good for you. But it's important to watch how much oil you include in your diet. Oils include avocado, canola, corn, olive, soybean, vegetable, and sunflower. Foods that are mainly oil include mayonnaise, certain salad dressings, and soft margarines. You likely already get your daily oil allowance from the foods you eat.    Salt (sodium).  Many processed foods have a lot of sodium. To keep sodium intake down, eat fresh vegetables, meats, poultry, and seafood when possible. Buy low-sodium, reduced-sodium, or no-salt-added food products at the store. And don't add salt to your meals at home. Instead, season them with herbs and spices such as dill, oregano,  cumin, and paprika. Or try adding flavor with vinegar, onion, garlic, or lemon or lime zest and juice.    Saturated fat . Saturated fats are most often found in animal products such as beef, pork, and chicken. They are often solid at room temperature, such as butter. To reduce your saturated fat intake, choose leaner cuts of meat and poultry. And try healthier cooking methods such as grilling, broiling, roasting, or baking. For a simple lower-fat swap, use plain nonfat yogurt instead of mayonnaise when making potato salad or macaroni salad.    Added sugars.  These are sugars added to foods. They are in foods such as ice cream, candy, soda, fruit drinks, sports drinks, energy drinks, cookies, pastries, jams, and syrups. Cut down on added sugars by sharing sweet treats with a family member or friend. You can also choose fruit for dessert, and drink water or other unsweetened beverages.    Alcohol. Alcohol contains calories but few nutrients. If you don't drink alcohol, don't start drinking for any reason. But if you do choose to drink alcohol, do so only in moderation. This means no more than 2 drinks in a day for men and no more than 1 drink per day for women, on days when you have alcohol.  [employer] last reviewed this educational content on 1/1/2023 2000-2023 [employer]. All rights reserved. This information is not intended as a substitute for professional medical care. Always follow your healthcare professional's instructions.          Signs of Hearing Loss  Hearing loss is a problem shared by many people. In fact, it's one of the most common health problems, particularly as people age. Most people aged 65 and older have some hearing loss. By age 80, almost everyone does. Hearing loss often occurs slowly over the years. So, you may not realize your hearing has gotten worse.   When sudden hearing loss occurs, it's important to contact your healthcare provider right away. Your provider will do a  medical exam and a hearing exam as soon as possible. This is to help find the cause and type of your sudden hearing loss. Based on your diagnosis, your healthcare provider will discuss possible treatments.      Hearing much better with one ear can be a sign of hearing loss.     Have your hearing checked  Call your healthcare provider if you:     Have to strain to hear normal conversation    Have to watch other people s faces very carefully to follow what they re saying    Need to ask people to repeat what they ve said    Often misunderstand what people are saying    Turn the volume of the television or radio up so high that others complain    Feel that people are mumbling when they re talking to you    Find that the effort to hear leaves you feeling tired and irritated    Notice, when using the phone, that you hear better with one ear than the other  [employer] last reviewed this educational content on 6/1/2022 2000-2023 [employer]. All rights reserved. This information is not intended as a substitute for professional medical care. Always follow your healthcare professional's instructions.          Urinary Incontinence, Female (Adult)   Urinary incontinence means loss of bladder control. This problem affects many women, especially as they get older. If you have incontinence, you may be embarrassed to ask for help. But know that this problem can be treated.   Types of Incontinence  There are different types of incontinence. Two of the main types are described here. You can have more than one type.     Stress incontinence. With this type, urine leaks when pressure (stress) is put on the bladder. This may happen when you cough, sneeze, or laugh. Stress incontinence most often occurs because the pelvic floor muscles that support the bladder and urethra are weak. This can happen after pregnancy and vaginal childbirth or a hysterectomy. It can also be due to excess body weight or hormone changes.    Urge  incontinence (also called overactive bladder). With this type, a sudden urge to urinate is felt often. This may happen even though there may not be much urine in the bladder. The need to urinate often during the night is common. Urge incontinence most often occurs because of bladder spasms. This may be due to bladder irritation or infection. Damage to bladder nerves or pelvic muscles, constipation, and certain medicines can also lead to urge incontinence.  Treatment depends on the cause. Further evaluation is needed to find the type you have. This will likely include an exam and certain tests. Based on the results, you and your healthcare provider can then plan treatment. Until a diagnosis is made, the home care tips below can help ease symptoms.   Home care    Do pelvic floor muscle exercises, if they are prescribed. The pelvic floor muscles help support the bladder and urethra. Many women find that their symptoms improve when doing special exercises that strengthen these muscles. To do the exercises, contract the muscles you would use to stop your stream of urine. But do this when you re not urinating. Hold for 10 seconds, then relax. Repeat 10 to 20 times in a row, at least 3 times a day. Your healthcare provider may give you other instructions for how to do the exercises and how often.    Keep a bladder diary. This helps track how often and how much you urinate over a set period of time. Bring this diary with you to your next visit with the provider. The information can help your provider learn more about your bladder problem.    Lose weight, if advised to by your provider. Extra weight puts pressure on the bladder. Your provider can help you create a weight-loss plan that s right for you. This may include exercising more and making certain diet changes.    Don't have foods and drinks that may irritate the bladder. These can include alcohol and caffeinated drinks.    Quit smoking. Smoking and other tobacco use  can lead to a long-term (chronic) cough that strains the pelvic floor muscles. Smoking may also damage the bladder and urethra. Talk with your provider about treatments or methods you can use to quit smoking.    If drinking large amounts of fluid makes you have symptoms, you may be advised to limit your fluid intake. You may also be advised to drink most of your fluids during the day and to limit fluids at night.    If you re worried about urine leakage or accidents, you may wear absorbent pads to catch urine. Change the pads often. This helps reduce discomfort. It may also reduce the risk of skin or bladder infections.    Follow-up care  Follow up with your healthcare provider, or as directed. It may take some to find the right treatment for your problem. But healthy lifestyle changes can be made right away. These include such things as exercising on a regular basis, eating a healthy diet, losing weight (if needed), and quitting smoking. Your treatment plan may include special therapies or medicines. Certain procedures or surgery may also be options. Talk about any questions you have with your provider.   When to seek medical advice  Call the healthcare provider right away if any of these occur:    Fever of 100.4 F (38 C) or higher, or as directed by your provider    Bladder pain or fullness    Belly swelling    Nausea or vomiting    Back pain    Weakness, dizziness, or fainting  [employer] last reviewed this educational content on 1/1/2020 2000-2022 [employer]. All rights reserved. This information is not intended as a substitute for professional medical care. Always follow your healthcare professional's instructions.

## 2023-07-05 NOTE — PROGRESS NOTES
"SUBJECTIVE:   Ria is a 72 year old who presents for Preventive Visit.      7/5/2023    10:41 AM   Additional Questions   Roomed by vj moran   Accompanied by self     Are you in the first 12 months of your Medicare coverage?  No    Healthy Habits:     In general, how would you rate your overall health?  Fair    Frequency of exercise:  4-5 days/week    Duration of exercise:  15-30 minutes    Do you usually eat at least 4 servings of fruit and vegetables a day, include whole grains    & fiber and avoid regularly eating high fat or \"junk\" foods?  No    Taking medications regularly:  Yes    Medication side effects:  Not applicable    Ability to successfully perform activities of daily living:  No assistance needed    Home Safety:  No safety concerns identified    Hearing Impairment:  Need to ask people to speak up or repeat themselves    In the past 6 months, have you been bothered by leaking of urine? Yes    In general, how would you rate your overall mental or emotional health?  Good    Additional concerns today:  No    Multiple other concerns today.     Ongoing diarrhea for the past 1-2 weeks. She ate a chicken and spinach wrap and has had symptoms since.     Fatigue. Feverish.     Acid reflux symptoms.     RUQ pain and fullness with pain radiating into back on right shoulder. She still has her gallbladder. Appendix has been removed.     Swelling to right side of neck. She feels swollen in throat and has increased pressure with swallowing.     She has chest pain that comes and goes.     Have you ever done Advance Care Planning? (For example, a Health Directive, POLST, or a discussion with a medical provider or your loved ones about your wishes): Yes, patient states has an Advance Care Planning document and will bring a copy to the clinic.       Fall risk  Fallen 2 or more times in the past year?: No  Any fall with injury in the past year?: No    Cognitive Screening   1) Repeat 3 items (Leader, Season, Table)  "   2) Clock draw: NORMAL  3) 3 item recall: Recalls 1 object   Results: NORMAL clock, 1-2 items recalled: COGNITIVE IMPAIRMENT LESS LIKELY    Mini-CogTM Copyright BRISSA Rose. Licensed by the author for use in [facility]; reprinted with permission (hany@[email or web address]). All rights reserved.      Do you have sleep apnea, excessive snoring or daytime drowsiness?: no    Reviewed and updated as needed this visit by clinical staff   Tobacco  Allergies     Surg Hx           Reviewed and updated as needed this visit by Provider        Surg Hx          Social History     Tobacco Use     Smoking status: Never     Smokeless tobacco: Never   Substance Use Topics     Alcohol use: Yes     Alcohol/week: 1.0 standard drink of alcohol     Types: 1 Glasses of wine per week           7/5/2023    10:30 AM   Alcohol Use   Prescreen: >3 drinks/day or >7 drinks/week? No     Do you have a current opioid prescription? No  Do you use any other controlled substances or medications that are not prescribed by a provider? None        Current providers sharing in care for this patient include:   Patient Care Team:  Emmy Barrett APRN CNP as PCP - General (Family Medicine)  Emmy Barrett APRN CNP as Assigned PCP  Abner Brown MD as Assigned Surgical Provider    The following health maintenance items are reviewed in Epic and correct as of today:  Health Maintenance   Topic Date Due     ZOSTER IMMUNIZATION (1 of 2) Never done     Pneumococcal Vaccine: 65+ Years (1 - PCV) Never done     COVID-19 Vaccine (3 - Pfizer series) 09/20/2021     COLORECTAL CANCER SCREENING  05/17/2022     PHQ-2 (once per calendar year)  01/01/2023     MAMMO SCREENING  04/16/2023     INFLUENZA VACCINE (1) 09/01/2023     MEDICARE ANNUAL WELLNESS VISIT  07/05/2024     FALL RISK ASSESSMENT  07/05/2024     DTAP/TDAP/TD IMMUNIZATION (2 - Td or Tdap) 03/04/2025     LIPID  04/16/2026     ADVANCE CARE PLANNING  07/05/2028     DEXA  04/16/2036     HEPATITIS C  SCREENING  Completed     IPV IMMUNIZATION  Aged Out     MENINGITIS IMMUNIZATION  Aged Out         Review of Systems   Constitutional: Positive for chills and fever.   HENT: Positive for congestion, ear pain, hearing loss and sore throat.    Eyes: Positive for pain and visual disturbance.   Respiratory: Positive for cough and shortness of breath.    Cardiovascular: Positive for chest pain and palpitations. Negative for peripheral edema.   Gastrointestinal: Positive for abdominal pain, diarrhea, heartburn and nausea. Negative for constipation.   Breasts:  Positive for tenderness. Negative for breast mass and discharge.   Genitourinary: Positive for frequency, pelvic pain and urgency. Negative for dysuria, genital sores, hematuria, vaginal bleeding and vaginal discharge.   Musculoskeletal: Positive for myalgias. Negative for arthralgias and joint swelling.   Skin: Positive for rash.   Neurological: Positive for dizziness, weakness and headaches. Negative for paresthesias.   Psychiatric/Behavioral: Negative for mood changes. The patient is not nervous/anxious.      CONSTITUTIONAL: NEGATIVE for fever, chills, change in weight  INTEGUMENTARY/SKIN: NEGATIVE for worrisome rashes, moles or lesions  EYES: NEGATIVE for vision changes or irritation  ENT/MOUTH: NEGATIVE for ear, mouth and throat problems  RESP: NEGATIVE for significant cough or SOB  BREAST: NEGATIVE for masses, tenderness or discharge  CV: NEGATIVE for chest pain, palpitations or peripheral edema  GI: NEGATIVE for nausea, abdominal pain, heartburn, or change in bowel habits  : NEGATIVE for frequency, dysuria, or hematuria  MUSCULOSKELETAL: NEGATIVE for significant arthralgias or myalgia  NEURO: NEGATIVE for weakness, dizziness or paresthesias  ENDOCRINE: NEGATIVE for temperature intolerance, skin/hair changes  HEME: NEGATIVE for bleeding problems  PSYCHIATRIC: NEGATIVE for changes in mood or affect    OBJECTIVE:   /78   Pulse 66   Temp 97.8  F (36.6  " C)   Resp 18   Ht 1.499 m (4' 11\")   Wt 73.8 kg (162 lb 9.6 oz)   LMP  (LMP Unknown)   SpO2 98%   BMI 32.84 kg/m   Estimated body mass index is 32.84 kg/m  as calculated from the following:    Height as of this encounter: 1.499 m (4' 11\").    Weight as of this encounter: 73.8 kg (162 lb 9.6 oz).  Physical Exam  GENERAL: healthy, alert and no distress  EYES: Eyes grossly normal to inspection, PERRL and conjunctivae and sclerae normal  HENT: ear canals and TM's normal, nose and mouth without ulcers or lesions  NECK: +rigth side of neck with cervical lymphadenopathy. +thyroid appears more enlarged on right side of neck   RESP: lungs clear to auscultation - no rales, rhonchi or wheezes  BREAST: Declined exam   CV: regular rate and rhythm, normal S1 S2, no S3 or S4, no murmur, click or rub, no peripheral edema and peripheral pulses strong  ABDOMEN: soft, no hepatosplenomegaly, no masses and bowel sounds normal. +TTP to right upper quadrant without rebound tenderness    (female): Declined exam   MS: no gross musculoskeletal defects noted, no edema  SKIN: no suspicious lesions or rashes  NEURO: Normal strength and tone, mentation intact and speech normal  PSYCH: mentation appears normal, affect normal/bright    Diagnostic Test Results:  Labs reviewed in Epic         EKG Interpretation:      Interpreted by RAVI Garcia CNP  Time reviewed: 1155   Symptoms at time of EKG: None   Rhythm: Normal sinus   Rate: Normal  Axis: Left Axis Deviation  Ectopy: None  Conduction: Normal  ST Segments/ T Waves: No ST-T wave changes and No acute ischemic changes  Q Waves: None  Comparison to prior: Unchanged from 4/12/2022    Clinical Impression: non-specific EKG      ASSESSMENT / PLAN:       ICD-10-CM    1. KIYA (generalized anxiety disorder)  F41.1       2. Multiple thyroid nodules  E04.2 TSH with free T4 reflex     US Thyroid     TSH with free T4 reflex      3. Encounter for Medicare annual wellness exam  Z00.00 CBC " "with platelets and differential     Comprehensive metabolic panel     UA Macroscopic with reflex to Microscopic and Culture     Lipid Profile (Chol, Trig, HDL, LDL calc)     TSH with free T4 reflex     CBC with platelets and differential     Comprehensive metabolic panel     UA Macroscopic with reflex to Microscopic and Culture     Lipid Profile (Chol, Trig, HDL, LDL calc)     TSH with free T4 reflex     UA Microscopic with Reflex to Culture      4. Fatigue, unspecified type  R53.83 TSH with free T4 reflex     EKG 12-lead complete w/read - (Clinic Performed)     TSH with free T4 reflex      5. Screening for colon cancer  Z12.11 Adult GI  Referral - Procedure Only      6. Diarrhea, unspecified type  R19.7 Enteric Bacteria and Virus Panel by MARY Stool     Ova and Parasite Screen     C. difficile Toxin B PCR with reflex to C. difficile Antigen and Toxins A/B EIA     Enteric Bacteria and Virus Panel by MARY Stool     Ova and Parasite Screen     C. difficile Toxin B PCR with reflex to C. difficile Antigen and Toxins A/B EIA      7. Generalized abdominal tenderness, rebound tenderness presence not specified  R10.817 Enteric Bacteria and Virus Panel by MARY Stool     Enteric Bacteria and Virus Panel by MARY Stool      8. RUQ abdominal pain  R10.11 US Abdomen Limited     CRP, inflammation     Lipase      9. Neck swelling  R22.1 US Carotid Right     CT Soft Tissue Neck w/o Contrast      10. Muscle ache  M79.10 CK total          COUNSELING:  Reviewed preventive health counseling, as reflected in patient instructions       Regular exercise       Healthy diet/nutrition       Vision screening       Hearing screening       Bladder control       Osteoporosis prevention/bone health       Colon cancer screening      BMI:   Estimated body mass index is 32.84 kg/m  as calculated from the following:    Height as of this encounter: 1.499 m (4' 11\").    Weight as of this encounter: 73.8 kg (162 lb 9.6 oz).   Weight management plan: " Discussed healthy diet and exercise guidelines      She reports that she has never smoked. She has never used smokeless tobacco.      Appropriate preventive services were discussed with this patient, including applicable screening as appropriate for cardiovascular disease, diabetes, osteopenia/osteoporosis, and glaucoma.  As appropriate for age/gender, discussed screening for colorectal cancer, prostate cancer, breast cancer, and cervical cancer. Checklist reviewing preventive services available has been given to the patient.    Reviewed patients plan of care and provided an AVS. The Basic Care Plan (routine screening as documented in Health Maintenance) for Ria meets the Care Plan requirement. This Care Plan has been established and reviewed with the Patient.      RAVI Garcia [facility]    Identified Health Risks:    I have reviewed Opioid Use Disorder and Substance Use Disorder risk factors and made any needed referrals.       The patient was provided with suggestions to help her develop a healthy physical lifestyle.  The patient was counseled and encouraged to consider modifying their diet and eating habits. She was provided with information on recommended healthy diet options.  The patient was provided with written information regarding signs of hearing loss.  Information on urinary incontinence and treatment options given to patient.

## 2023-07-06 ENCOUNTER — HOSPITAL ENCOUNTER (OUTPATIENT)
Dept: ULTRASOUND IMAGING | Facility: HOSPITAL | Age: 73
Discharge: HOME | End: 2023-07-06
Attending: NURSE PRACTITIONER | Admitting: NURSE PRACTITIONER
Payer: MEDICARE

## 2023-07-06 ENCOUNTER — TELEPHONE (OUTPATIENT)
Dept: FAMILY MEDICINE | Facility: OTHER | Age: 73
End: 2023-07-06

## 2023-07-06 DIAGNOSIS — R10.11 RUQ ABDOMINAL PAIN: ICD-10-CM

## 2023-07-06 DIAGNOSIS — R10.11 RUQ ABDOMINAL PAIN: Primary | ICD-10-CM

## 2023-07-06 DIAGNOSIS — K21.00 GASTROESOPHAGEAL REFLUX DISEASE WITH ESOPHAGITIS, UNSPECIFIED WHETHER HEMORRHAGE: ICD-10-CM

## 2023-07-06 PROCEDURE — 76705 ECHO EXAM OF ABDOMEN: CPT

## 2023-07-06 RX ORDER — PANTOPRAZOLE SODIUM 40 MG/1
40 TABLET, DELAYED RELEASE ORAL DAILY
Qty: 30 TABLET | Refills: 1 | Status: ON HOLD | OUTPATIENT
Start: 2023-07-06 | End: 2023-09-29

## 2023-07-06 NOTE — CONFIDENTIAL NOTE
I also ordered Protonix to help with the burning she is feeling her her throat. She should take Protonix in the am on an empty stomach 30 minutes prior to first meal. Please advise.

## 2023-07-07 ENCOUNTER — APPOINTMENT (OUTPATIENT)
Dept: LAB | Facility: OTHER | Age: 73
End: 2023-07-07
Payer: MEDICARE

## 2023-07-07 ENCOUNTER — TELEPHONE (OUTPATIENT)
Dept: FAMILY MEDICINE | Facility: OTHER | Age: 73
End: 2023-07-07

## 2023-07-07 DIAGNOSIS — Z12.11 ENCOUNTER FOR SCREENING COLONOSCOPY: Primary | ICD-10-CM

## 2023-07-07 DIAGNOSIS — E78.2 MIXED HYPERLIPIDEMIA: Primary | ICD-10-CM

## 2023-07-07 PROCEDURE — 87506 IADNA-DNA/RNA PROBE TQ 6-11: CPT | Mod: ZL | Performed by: NURSE PRACTITIONER

## 2023-07-07 PROCEDURE — 87209 SMEAR COMPLEX STAIN: CPT | Mod: ZL | Performed by: NURSE PRACTITIONER

## 2023-07-07 RX ORDER — BISACODYL 5 MG/1
10 TABLET, DELAYED RELEASE ORAL ONCE
Qty: 2 TABLET | Refills: 0 | Status: SHIPPED | OUTPATIENT
Start: 2023-07-07 | End: 2023-07-07

## 2023-07-07 NOTE — TELEPHONE ENCOUNTER
Screening Questions for the Scheduling of Screening Colonoscopies     (If Colonoscopy is diagnostic, Provider should review the chart before scheduling.)    Are you younger than 50 or older than 80?  No    Do you take aspirin or fish oil?  No (if yes, tell patient to stop 1 week prior to Colonoscopy)    Do you take warfarin (Coumadin), clopidogrel (Plavix), apixaban (Eliquis), dabigatram (Pradaxa), rivaroxaban (Xarelto) or any blood thinner? No    Do you use oxygen at home?  No    Do you have kidney disease? No    Are you on dialysis? No    Have you had a stroke or heart attack in the last year? No    Have you had a stent in your heart or any blood vessel in the last year? No    Have you had a transplant of any organ?  No    Have you had a colonoscopy or upper endoscopy (EGD) before?  YES. Date-.         Date of scheduled Colonoscopy: 9/29/23    [facility]

## 2023-07-07 NOTE — TELEPHONE ENCOUNTER
----- Message from RAVI Garcia CNP sent at 7/6/2023  8:20 AM CDT -----  Stable labs. Cholesterol and bad cholesterol (LDL) slightly elevated. Recommendation is to start on a low dose cholesterol medication with a 13.1% ten year risk factor for stroke or heart attack. Please call.     The 10-year ASCVD risk score (Marko ESTEVEZ, et al., 2019) is: 13.1%    Values used to calculate the score:      Age: 72 years      Sex: Female      Is Non- : No      Diabetic: No      Tobacco smoker: No      Systolic Blood Pressure: 138 mmHg      Is BP treated: No      HDL Cholesterol: 69 mg/dL      Total Cholesterol: 200 mg/dL

## 2023-07-07 NOTE — TELEPHONE ENCOUNTER
Screening Colonoscopy - Meet & Greet scheduled on 9/29/23 with Dr Brown.  Colonoscopy Prep instruction booklet sent by mail today.

## 2023-07-10 LAB — O+P STL MICRO: NEGATIVE

## 2023-07-10 RX ORDER — ATORVASTATIN CALCIUM 40 MG/1
40 TABLET, FILM COATED ORAL DAILY
Qty: 90 TABLET | Refills: 1 | Status: ON HOLD | OUTPATIENT
Start: 2023-07-10 | End: 2023-09-29

## 2023-07-14 ENCOUNTER — TELEPHONE (OUTPATIENT)
Dept: NUCLEAR MEDICINE | Facility: HOSPITAL | Age: 73
End: 2023-07-14

## 2023-07-14 ENCOUNTER — HOSPITAL ENCOUNTER (OUTPATIENT)
Dept: CT IMAGING | Facility: HOSPITAL | Age: 73
Discharge: HOME | End: 2023-07-14
Attending: NURSE PRACTITIONER
Payer: MEDICARE

## 2023-07-14 ENCOUNTER — HOSPITAL ENCOUNTER (OUTPATIENT)
Dept: ULTRASOUND IMAGING | Facility: HOSPITAL | Age: 73
Discharge: HOME | End: 2023-07-14
Attending: NURSE PRACTITIONER
Payer: MEDICARE

## 2023-07-14 DIAGNOSIS — R22.1 NECK SWELLING: ICD-10-CM

## 2023-07-14 DIAGNOSIS — E04.2 MULTIPLE THYROID NODULES: ICD-10-CM

## 2023-07-14 PROCEDURE — 70490 CT SOFT TISSUE NECK W/O DYE: CPT | Mod: MG

## 2023-07-14 PROCEDURE — 76536 US EXAM OF HEAD AND NECK: CPT

## 2023-07-14 PROCEDURE — 93880 EXTRACRANIAL BILAT STUDY: CPT

## 2023-07-14 NOTE — TELEPHONE ENCOUNTER
Left a vague appt reminder call on answering machine regarding appt scheduled for 7/17 at 1300. Left call back number if patient has questions regarding preparation.

## 2023-07-17 ENCOUNTER — HOSPITAL ENCOUNTER (OUTPATIENT)
Dept: NUCLEAR MEDICINE | Facility: HOSPITAL | Age: 73
Discharge: HOME | End: 2023-07-17
Attending: NURSE PRACTITIONER
Payer: MEDICARE

## 2023-07-17 DIAGNOSIS — R10.11 RUQ ABDOMINAL PAIN: ICD-10-CM

## 2023-07-17 PROCEDURE — 343N000001 HC RX 343: Performed by: RADIOLOGY

## 2023-07-17 PROCEDURE — A9537 TC99M MEBROFENIN: HCPCS | Performed by: RADIOLOGY

## 2023-07-17 PROCEDURE — 78227 HEPATOBIL SYST IMAGE W/DRUG: CPT | Mod: MG

## 2023-07-17 RX ORDER — KIT FOR THE PREPARATION OF TECHNETIUM TC 99M MEBROFENIN 45 MG/10ML
4-6 INJECTION, POWDER, LYOPHILIZED, FOR SOLUTION INTRAVENOUS ONCE
Status: COMPLETED | OUTPATIENT
Start: 2023-07-17 | End: 2023-07-17

## 2023-07-17 RX ADMIN — MEBROFENIN 5.29 MILLICURIE: 45 INJECTION, POWDER, LYOPHILIZED, FOR SOLUTION INTRAVENOUS at 13:06

## 2023-07-18 DIAGNOSIS — E04.9 GOITER: Primary | ICD-10-CM

## 2023-07-24 ENCOUNTER — TELEPHONE (OUTPATIENT)
Dept: MAMMOGRAPHY | Facility: OTHER | Age: 73
End: 2023-07-24

## 2023-07-24 ENCOUNTER — ANCILLARY PROCEDURE (OUTPATIENT)
Dept: MAMMOGRAPHY | Facility: OTHER | Age: 73
End: 2023-07-24
Attending: NURSE PRACTITIONER
Payer: MEDICARE

## 2023-07-24 DIAGNOSIS — Z12.31 VISIT FOR SCREENING MAMMOGRAM: ICD-10-CM

## 2023-07-24 PROCEDURE — 77067 SCR MAMMO BI INCL CAD: CPT | Mod: TC

## 2023-09-21 RX ORDER — BISACODYL 5 MG/1
10 TABLET, DELAYED RELEASE ORAL ONCE
Qty: 2 TABLET | Refills: 0 | Status: SHIPPED | OUTPATIENT
Start: 2023-09-21 | End: 2023-09-21

## 2023-09-21 NOTE — OR NURSING
Patient denies current use of NSAIDs or ASA, Also not taking current prescribed atorvastatin or pantoprazole & rarely takes calcium + Vit D.  No medications to take day of procedure.  Bowel Prep Rx resent to [facility] in [place] & Colonoscopy Bowel Prep Instruction Booklet resent by mail today.

## 2023-09-26 ENCOUNTER — ANESTHESIA EVENT (OUTPATIENT)
Dept: SURGERY | Facility: HOSPITAL | Age: 73
End: 2023-09-26
Payer: MEDICARE

## 2023-09-26 NOTE — ANESTHESIA PREPROCEDURE EVALUATION
"Anesthesia Pre-Procedure Evaluation    Patient: Ria Warren   MRN: [id number] : 1950        Procedure : Procedure(s):  COLONOSCOPY          Past Medical History:   Diagnosis Date     Asymptomatic menopausal state     No Comments Provided     Bitten by cat     ,Hospitalized     Encounter for full-term uncomplicated delivery      2, Para 1with 1 spontaneous      Encounter for screening for malignant neoplasm of colon     No Comments Provided     Generalized anxiety disorder     No Comments Provided     Low back pain     10/10/2006,Right. Under investigation     Lower abdominal pain     10/10/2006,mid to lower quadrant. Under investigation     Nontoxic goiter     No Comments Provided     Other chest pain     No Comments Provided     Uterine mass       Past Surgical History:   Procedure Laterality Date     APPENDECTOMY      . Surgeon told her she removed her appendix when she had her hysterectomy     COLONOSCOPY      2012     COLONOSCOPY  2017,follow up  tubular adenoma     HYSTERECTOMY       OTHER SURGICAL HISTORY      62125.0,PAST SURGICAL HISTORY, Hospitalized for cat bite~ Lymph node resection x 3 at left axilla for benign disease~Notes prior complications from anesthesia:     TONSILLECTOMY      No Comments Provided      Allergies   Allergen Reactions     Azithromycin      Other reaction(s): Vomiting     Penicillins      Other reaction(s): *Unknown  Any \"cillins\" Skin burn from inside out /told to not use        Social History     Tobacco Use     Smoking status: Never     Smokeless tobacco: Never   Substance Use Topics     Alcohol use: Yes     Alcohol/week: 1.0 standard drink of alcohol     Types: 1 Glasses of wine per week      Wt Readings from Last 1 Encounters:   23 73.8 kg (162 lb 9.6 oz)        Anesthesia Evaluation   Pt has had prior anesthetic. Type: MAC and General.        ROS/MED HX  ENT/Pulmonary: Comment: Chronic cough " "from postnasal gtt    Pt states she has trouble swallowing    (+)     JOSE CARLOS risk factors, snores loudly,   daytime somnolence,  allergic rhinitis (inhaler prescribed for allergies, has never needed to use),                            Neurologic:     (+)      migraines (none since menopause),                          Cardiovascular: Comment: Climbs 3 flights of steps carrying full laundry basket  - neg cardiovascular ROS     METS/Exercise Tolerance: >4 METS    Hematologic:  - neg hematologic  ROS     Musculoskeletal: Comment: Pt states there are times when she \"feels like her head is going to fall off\" states neck is sore today      GI/Hepatic:     (+) GERD, Asymptomatic on medication,      bowel prep,            Renal/Genitourinary:  - neg Renal ROS     Endo: Comment: Multiple thyroid nodules     (+)          thyroid problem, goiter,    Obesity,       Psychiatric/Substance Use:     (+) psychiatric history anxiety       Infectious Disease:  - neg infectious disease ROS     Malignancy:  - neg malignancy ROS     Other:            Physical Exam    Airway        Mallampati: II   TM distance: > 3 FB   Neck ROM: limited   Mouth opening: > 3 cm    Respiratory Devices and Support         Dental           Cardiovascular             Pulmonary               OUTSIDE LABS:  CBC:   Lab Results   Component Value Date    WBC 6.2 07/05/2023    WBC 7.5 04/12/2022    HGB 12.7 07/05/2023    HGB 12.7 05/24/2022    HCT 39.3 07/05/2023    HCT 37.6 04/12/2022     07/05/2023     04/12/2022     BMP:   Lab Results   Component Value Date     07/05/2023     04/12/2022    POTASSIUM 4.2 07/05/2023    POTASSIUM 3.5 04/12/2022    CHLORIDE 101 07/05/2023    CHLORIDE 108 04/12/2022    CO2 27 07/05/2023    CO2 27 04/12/2022    BUN 13.5 07/05/2023    BUN 19 04/12/2022    CR 0.70 07/05/2023    CR 0.74 04/12/2022    GLC 84 07/05/2023     (H) 04/12/2022     COAGS: No results found for: PTT, INR, FIBR  POC: No results found " for: BGM, HCG, HCGS  HEPATIC:   Lab Results   Component Value Date    ALBUMIN 4.2 07/05/2023    PROTTOTAL 7.2 07/05/2023    ALT 13 07/05/2023    AST 21 07/05/2023    ALKPHOS 63 07/05/2023    BILITOTAL 0.4 07/05/2023     OTHER:   Lab Results   Component Value Date    ERIK 9.7 07/05/2023    LIPASE 30 07/05/2023    TSH 0.42 07/05/2023    T4 0.90 03/04/2015       Anesthesia Plan    ASA Status:  3    NPO Status:  NPO status:: 0710 finished prep; last food 2 days ago in evening.   Anesthesia Type: MAC.     - Reason for MAC: straight local not clinically adequate              Consents    Anesthesia Plan(s) and associated risks, benefits, and realistic alternatives discussed. Questions answered and patient/representative(s) expressed understanding.     - Discussed:     - Discussed with:  Patient      - Extended Intubation/Ventilatory Support Discussed: No.      - Patient is DNR/DNI Status: No     Use of blood products discussed: No .     Postoperative Care            Comments:    Other Comments: Day of HP      Risks and benefits of MAC anesthetic discussed including dental damage, aspiration, loss of airway, conversion to general anesthetic, CV complications, MI, stroke, death. Pt wishes to proceed.               RAVI Nj CRNA

## 2023-09-28 NOTE — DISCHARGE INSTRUCTIONS
You had no colon polyps today.  We invite you to return in 5 years for your next screening colonoscopy.     Post-Anesthesia Patient Instructions    IMMEDIATELY FOLLOWING SURGERY:  Do not drive or operate machinery for the first twenty four hours after surgery.  Do not make any important decisions for twenty four hours after surgery or while taking narcotic pain medications or sedatives.  If you develop intractable nausea and vomiting or a severe headache please notify your doctor immediately.    FOLLOW-UP:  Please make an appointment with your surgeon as instructed. You do not need to follow up with anesthesia unless specifically instructed to do so.    WOUND CARE INSTRUCTIONS (if applicable):  Keep a dry clean dressing on the anesthesia/puncture wound site if there is drainage.  Once the wound has quit draining you may leave it open to air.  Generally you should leave the bandage intact for twenty four hours unless there is drainage.  If the epidural site drains for more than 36-48 hours please call the anesthesia department.    QUESTIONS?:  Please feel free to call your physician or the hospital  if you have any questions, and they will be happy to assist you.       Colonoscopy: What to Expect at Home  Your Recovery  After a colonoscopy, you'll stay at the clinic until you wake up. Then you can go home. But you'll need to arrange for a ride. Your doctor will tell you when you can eat and do your other usual activities.  Your doctor will talk to you about when you'll need your next colonoscopy. Your doctor can help you decide how often you need to be checked. This will depend on the results of your test and your risk for colorectal cancer.  After the test, you may be bloated or have gas pains. You may need to pass gas. If a biopsy was done or a polyp was removed, you may have streaks of blood in your stool (feces) for a few days. Problems such as heavy rectal bleeding may not occur until several weeks  after the test. This isn't common. But it can happen after polyps are removed.  This care sheet gives you a general idea about how long it will take for you to recover. But each person recovers at a different pace. Follow the steps below to get better as quickly as possible.  How can you care for yourself at home?  Activity    Rest when you feel tired.     You can do your normal activities when it feels okay to do so.   Diet    Follow your doctor's directions for eating.     Unless your doctor has told you not to, drink plenty of fluids. This helps to replace the fluids that were lost during the colon prep.     Do not drink alcohol.   Medicines    Your doctor will tell you if and when you can restart your medicines. You will also be given instructions about taking any new medicines.     If you stopped taking aspirin or some other blood thinner, your doctor will tell you when to start taking it again.     If polyps were removed or a biopsy was done during the test, your doctor may tell you not to take aspirin or other anti-inflammatory medicines for a few days. These include ibuprofen (Advil, Motrin) and naproxen (Aleve).   Other instructions    For your safety, do not drive or operate machinery until the medicine wears off and you can think clearly. Your doctor may tell you not to drive or operate machinery until the day after your test.     Do not sign legal documents or make major decisions until the medicine wears off and you can think clearly. The anesthesia can make it hard for you to fully understand what you are agreeing to.   Follow-up care is a key part of your treatment and safety. Be sure to make and go to all appointments, and call your doctor if you are having problems. It's also a good idea to know your test results and keep a list of the medicines you take.  When should you call for help?   Call 911 anytime you think you may need emergency care. For example, call if:    You passed out (lost  "consciousness).     You pass maroon or bloody stools.     You have trouble breathing.   Call your doctor now or seek immediate medical care if:    You have pain that does not get better after you take pain medicine.     You are sick to your stomach or cannot drink fluids.     You have new or worse belly pain.     You have blood in your stools.     You have a fever.     You cannot pass stools or gas.   Watch closely for changes in your health, and be sure to contact your doctor if you have any problems.  Where can you learn more?  Go to https://www.[employer].com.net/patiented  Enter E264 in the search box to learn more about \"Colonoscopy: What to Expect at Home.\"  Current as of: March 1, 2023               Content Version: 13.7    4149-0161 [employer].   Care instructions adapted under license by your healthcare professional. If you have questions about a medical condition or this instruction, always ask your healthcare professional. [employer] disclaims any warranty or liability for your use of this information.      "

## 2023-09-29 ENCOUNTER — ANESTHESIA (OUTPATIENT)
Dept: SURGERY | Facility: HOSPITAL | Age: 73
End: 2023-09-29
Payer: MEDICARE

## 2023-09-29 ENCOUNTER — HOSPITAL ENCOUNTER (OUTPATIENT)
Facility: HOSPITAL | Age: 73
Discharge: HOME | End: 2023-09-29
Attending: SURGERY | Admitting: SURGERY
Payer: MEDICARE

## 2023-09-29 VITALS
DIASTOLIC BLOOD PRESSURE: 81 MMHG | WEIGHT: 158.8 LBS | OXYGEN SATURATION: 99 % | HEIGHT: 61 IN | RESPIRATION RATE: 16 BRPM | SYSTOLIC BLOOD PRESSURE: 107 MMHG | BODY MASS INDEX: 29.98 KG/M2 | TEMPERATURE: 98 F | HEART RATE: 74 BPM

## 2023-09-29 PROCEDURE — 999N000141 HC STATISTIC PRE-PROCEDURE NURSING ASSESSMENT: Performed by: SURGERY

## 2023-09-29 PROCEDURE — 45378 DIAGNOSTIC COLONOSCOPY: CPT | Performed by: NURSE ANESTHETIST, CERTIFIED REGISTERED

## 2023-09-29 PROCEDURE — 250N000009 HC RX 250: Performed by: NURSE ANESTHETIST, CERTIFIED REGISTERED

## 2023-09-29 PROCEDURE — 710N000012 HC RECOVERY PHASE 2, PER MINUTE: Performed by: SURGERY

## 2023-09-29 PROCEDURE — 360N000075 HC SURGERY LEVEL 2, PER MIN: Performed by: SURGERY

## 2023-09-29 PROCEDURE — 258N000003 HC RX IP 258 OP 636: Performed by: NURSE ANESTHETIST, CERTIFIED REGISTERED

## 2023-09-29 PROCEDURE — 99100 ANES PT EXTEME AGE<1 YR&>70: CPT | Performed by: NURSE ANESTHETIST, CERTIFIED REGISTERED

## 2023-09-29 PROCEDURE — G0105 COLORECTAL SCRN; HI RISK IND: HCPCS | Performed by: SURGERY

## 2023-09-29 PROCEDURE — 250N000011 HC RX IP 250 OP 636: Performed by: NURSE ANESTHETIST, CERTIFIED REGISTERED

## 2023-09-29 PROCEDURE — 370N000017 HC ANESTHESIA TECHNICAL FEE, PER MIN: Performed by: SURGERY

## 2023-09-29 RX ORDER — SODIUM CHLORIDE, SODIUM LACTATE, POTASSIUM CHLORIDE, CALCIUM CHLORIDE 600; 310; 30; 20 MG/100ML; MG/100ML; MG/100ML; MG/100ML
INJECTION, SOLUTION INTRAVENOUS CONTINUOUS
Status: DISCONTINUED | OUTPATIENT
Start: 2023-09-29 | End: 2023-09-29 | Stop reason: HOSPADM

## 2023-09-29 RX ORDER — LABETALOL 20 MG/4 ML (5 MG/ML) INTRAVENOUS SYRINGE
10
Status: DISCONTINUED | OUTPATIENT
Start: 2023-09-29 | End: 2023-09-29 | Stop reason: HOSPADM

## 2023-09-29 RX ORDER — NALOXONE HYDROCHLORIDE 0.4 MG/ML
0.4 INJECTION, SOLUTION INTRAMUSCULAR; INTRAVENOUS; SUBCUTANEOUS
Status: DISCONTINUED | OUTPATIENT
Start: 2023-09-29 | End: 2023-09-29 | Stop reason: HOSPADM

## 2023-09-29 RX ORDER — ONDANSETRON 4 MG/1
4 TABLET, ORALLY DISINTEGRATING ORAL EVERY 30 MIN PRN
Status: DISCONTINUED | OUTPATIENT
Start: 2023-09-29 | End: 2023-09-29 | Stop reason: HOSPADM

## 2023-09-29 RX ORDER — PROPOFOL 10 MG/ML
INJECTION, EMULSION INTRAVENOUS CONTINUOUS PRN
Status: DISCONTINUED | OUTPATIENT
Start: 2023-09-29 | End: 2023-09-29

## 2023-09-29 RX ORDER — NALOXONE HYDROCHLORIDE 0.4 MG/ML
0.2 INJECTION, SOLUTION INTRAMUSCULAR; INTRAVENOUS; SUBCUTANEOUS
Status: DISCONTINUED | OUTPATIENT
Start: 2023-09-29 | End: 2023-09-29 | Stop reason: HOSPADM

## 2023-09-29 RX ORDER — LIDOCAINE HYDROCHLORIDE 20 MG/ML
INJECTION, SOLUTION INFILTRATION; PERINEURAL PRN
Status: DISCONTINUED | OUTPATIENT
Start: 2023-09-29 | End: 2023-09-29

## 2023-09-29 RX ORDER — FLUMAZENIL 0.1 MG/ML
0.2 INJECTION, SOLUTION INTRAVENOUS
Status: DISCONTINUED | OUTPATIENT
Start: 2023-09-29 | End: 2023-09-29 | Stop reason: HOSPADM

## 2023-09-29 RX ORDER — MULTIPLE VITAMINS W/ MINERALS TAB 9MG-400MCG
1 TAB ORAL DAILY
COMMUNITY

## 2023-09-29 RX ORDER — FENTANYL CITRATE 50 UG/ML
50 INJECTION, SOLUTION INTRAMUSCULAR; INTRAVENOUS EVERY 5 MIN PRN
Status: DISCONTINUED | OUTPATIENT
Start: 2023-09-29 | End: 2023-09-29 | Stop reason: HOSPADM

## 2023-09-29 RX ORDER — LIDOCAINE 40 MG/G
CREAM TOPICAL
Status: DISCONTINUED | OUTPATIENT
Start: 2023-09-29 | End: 2023-09-29 | Stop reason: HOSPADM

## 2023-09-29 RX ORDER — ONDANSETRON 2 MG/ML
4 INJECTION INTRAMUSCULAR; INTRAVENOUS EVERY 30 MIN PRN
Status: DISCONTINUED | OUTPATIENT
Start: 2023-09-29 | End: 2023-09-29 | Stop reason: HOSPADM

## 2023-09-29 RX ORDER — HYDRALAZINE HYDROCHLORIDE 20 MG/ML
2.5-5 INJECTION INTRAMUSCULAR; INTRAVENOUS EVERY 10 MIN PRN
Status: DISCONTINUED | OUTPATIENT
Start: 2023-09-29 | End: 2023-09-29 | Stop reason: HOSPADM

## 2023-09-29 RX ADMIN — PROPOFOL 200 MCG/KG/MIN: 10 INJECTION, EMULSION INTRAVENOUS at 11:07

## 2023-09-29 RX ADMIN — SODIUM CHLORIDE, POTASSIUM CHLORIDE, SODIUM LACTATE AND CALCIUM CHLORIDE: 600; 310; 30; 20 INJECTION, SOLUTION INTRAVENOUS at 10:37

## 2023-09-29 RX ADMIN — LIDOCAINE HYDROCHLORIDE 100 MG: 20 INJECTION, SOLUTION INFILTRATION; PERINEURAL at 11:06

## 2023-09-29 ASSESSMENT — ACTIVITIES OF DAILY LIVING (ADL): ADLS_ACUITY_SCORE: 35

## 2023-09-29 NOTE — ANESTHESIA CARE TRANSFER NOTE
Patient: Ria Warren    Procedure: Procedure(s):  COLONOSCOPY       Diagnosis: Encounter for screening colonoscopy [Z12.11]  Diagnosis Additional Information: No value filed.    Anesthesia Type:   MAC     Note:    Oropharynx: oropharynx clear of all foreign objects and spontaneously breathing  Level of Consciousness: drowsy  Oxygen Supplementation: room air    Independent Airway: airway patency satisfactory and stable  Dentition: dentition unchanged  Vital Signs Stable: post-procedure vital signs reviewed and stable  Report to RN Given: handoff report given  Patient transferred to: Phase II    Handoff Report: Identifed the Patient, Identified the Reponsible Provider, Reviewed the pertinent medical history, Discussed the surgical course, Reviewed Intra-OP anesthesia mangement and issues during anesthesia, Set expectations for post-procedure period and Allowed opportunity for questions and acknowledgement of understanding    Vitals:  Vitals Value Taken Time   BP     Temp     Pulse     Resp     SpO2         Electronically Signed By: RAVI Thurman CRNA  September 29, 2023  11:30 AM

## 2023-09-29 NOTE — H&P
Surgery Consult Clinic Note      RE: Ria Warren  : 1950        Chief Complaint:  Colon cancer screening  Personal history of colon polyps    History of Present Illness:  I am seeing Ria Warren at the request of Tere Barrett NP for evaluation regarding meet and greet screening colonoscopy.  She has had 3-4  previous colonoscopies, we have reports from  (1 tubulovillous adenoma and 1 adenoma) and  (1 tubular adenoma).  Her maternal grandfather had colon cancer.  She denies blood in stool, changes in bowel habits, weight loss, abdominal pain.  Previous abdominal surgeries include hysterectomy.   She has no questions regarding  bowel prep.  Reports passing clear yellow liquid stools today.     She specifically denies fevers, chills, nausea, vomiting, chest pain, shortness of breath, palpitations, sore throat, cough, or generalized feeling ill.       Medical history:  Past Medical History:   Diagnosis Date    Asymptomatic menopausal state     No Comments Provided    Bitten by cat     ,Hospitalized    Encounter for full-term uncomplicated delivery      2, Para 1with 1 spontaneous     Encounter for screening for malignant neoplasm of colon     No Comments Provided    Generalized anxiety disorder     No Comments Provided    Low back pain     10/10/2006,Right. Under investigation    Lower abdominal pain     10/10/2006,mid to lower quadrant. Under investigation    Nontoxic goiter     No Comments Provided    Other chest pain     No Comments Provided    Uterine mass        Surgical history:  Past Surgical History:   Procedure Laterality Date    APPENDECTOMY      . Surgeon told her she removed her appendix when she had her hysterectomy    COLONOSCOPY      2012    COLONOSCOPY  2017,follow up  tubular adenoma    HYSTERECTOMY      OTHER SURGICAL HISTORY      28326.0,PAST SURGICAL HISTORY, Hospitalized for cat bite~ Lymph node  resection x 3 at left axilla for benign disease~Notes prior complications from anesthesia:    TONSILLECTOMY      No Comments Provided       Family history:  Family History   Problem Relation Age of Onset    Breast Cancer Mother         Cancer-breast,50's    Colon Cancer Maternal Grandfather         Cancer-colon,Colon    Diabetes Maternal Grandfather         Diabetes    Heart Failure Maternal Grandmother         Heart failure    Other - See Comments Other         Maternal side heart disease    Thyroid Disease Other         Thyroid Disease,multiple women with goiters    Asthma Father     Asthma Paternal Uncle     Allergies Daughter     Blood Disease No family hx of         Blood Disease,no blood clotting disorders    Ovarian Cancer No family hx of         Cancer-ovarian       Medications:  Prior to Admission medications    Medication Sig Start Date End Date Taking? Authorizing Provider   atorvastatin (LIPITOR) 40 MG tablet Take 1 tablet (40 mg) by mouth daily  Patient not taking: Reported on 9/21/2023 7/10/23   César Lara MD   Calcium Carb-Cholecalciferol (CALCIUM 600 + D PO)     Reported, Patient   pantoprazole (PROTONIX) 40 MG EC tablet Take 1 tablet (40 mg) by mouth daily  Patient not taking: Reported on 9/21/2023 7/6/23   Emmy Barrett APRN CNP       Allergies:  The patient is allergic to azithromycin, eggshell membrane (chicken) [egg shells], and penicillins.  .  Social history:  Social History     Tobacco Use    Smoking status: Never    Smokeless tobacco: Never   Substance Use Topics    Alcohol use: Yes     Alcohol/week: 1.0 standard drink of alcohol     Types: 1 Glasses of wine per week     Marital status: .    Review of Systems:    Constitutional: Negative for fever, chills.   HENT: Negative for ear pain, congestion, sore throat, and ear discharge.    Eyes: Negative for blurred vision, double vision.   Respiratory: Negative for cough, hemoptysis, shortness of breath, wheezing and stridor.  "   Cardiovascular: Negative for chest pain, palpitations and orthopnea.   Gastrointestinal: Negative for heartburn, nausea, vomiting, abdominal pain and blood in stool.   Genitourinary: Negative for urgency, frequency   Musculoskeletal: Negative for myalgias, back pain and joint pain.   Neurological: Negative for tingling, speech change and headaches.   Endo/Heme/Allergies: Does not bruise/bleed easily.   Psychiatric/Behavioral: Negative for depression, suicidal ideas and hallucinations. The patient is not nervous/anxious.    Physical Examination:  LMP  (LMP Unknown)   /96   Pulse 81   Temp 97.3  F (36.3  C) (Tympanic)   Resp 16   Ht 1.549 m (5' 1\")   Wt 72 kg (158 lb 12.8 oz)   LMP  (LMP Unknown)   SpO2 97%   BMI 30.00 kg/m     General: Alert and orientedx4, no acute distress, well-developed/well-nourished, ambulating without assistance  HEENT: normocephalic atraumatic, extraocular movements intact, sclerae anicteric, Trachea midline  Chest:   Clear to auscultation bilaterally.  Cardiac: S1S2 , regular rate and rhythm without additional sounds  Abdomen: Soft, non-tender, non-distended  Extremities: Cursory exam unremarkable.  No peripheral edema noted.  Skin: Warm, dry, less than 2 sec cap refill  Neuro: CN 2-12 grossly intact, no focal deficit, GCS 15  Psych: Pleasant, calm, asks appropriate questions      Assessment/Plan:  #1 Colonoscopy    Ria Warren and I had a discussion about colonoscopies.  The indications, risks, benefits, althernatives and technical aspects of whole colon colonoscopy were outlined with risks including, but not limited to, perforation, bleeding and inability to visualize entire colon.  Management of each was reviewed including the risk for life saving surgery and possible admittance to the hospital.  The need of mechanical preparation of the colon was reviewed along with the use of monitored anesthetic care.  Her questions were asked and answered.  We will proceed " with colonoscopy with Dr. Brown as scheduled.  Dorothy Castillo [facility] and [address]    [id number]    Referring Provider:  No referring provider defined for this encounter.     Primary Care Provider:  Emmy Barrett

## 2023-09-29 NOTE — ANESTHESIA POSTPROCEDURE EVALUATION
Patient: Ria Warren    Procedure: Procedure(s):  COLONOSCOPY       Anesthesia Type:  MAC    Note:  Disposition: Outpatient   Postop Pain Control: Uneventful            Sign Out: Well controlled pain   PONV: No   Neuro/Psych: Uneventful            Sign Out: Acceptable/Baseline neuro status   Airway/Respiratory: Uneventful            Sign Out: Acceptable/Baseline resp. status   CV/Hemodynamics: Uneventful            Sign Out: Acceptable CV status; No obvious hypovolemia; No obvious fluid overload   Other NRE: NONE   DID A NON-ROUTINE EVENT OCCUR? No         Last vitals:  Vitals Value Taken Time   /81 09/29/23 1159   Temp 98  F (36.7  C) 09/29/23 1127   Pulse 74 09/29/23 1159   Resp 16 09/29/23 1155   SpO2 100 % 09/29/23 1205   Vitals shown include unvalidated device data.    Electronically Signed By: RAVI Thurman CRNA  September 29, 2023  12:09 PM

## 2023-09-29 NOTE — OP NOTE
Ria Warren MRN# [id number]   YOB: 1950 Age: 73 year old      Date of Admission:  9/29/2023  Date of Service:   9/29/23    Primary care provider: Emmy Barrett    PREOPERATIVE DIAGNOSIS:  Colon Cancer Screening        POSTOPERATIVE DIAGNOSIS:  Sigmoid diverticulosis          PROCEDURE:  Colonoscopy            INDICATIONS:  Screening colonoscopy.      Specimen: * No specimens in log *    SURGEON: Abner Brown MD    DESCRIPTION OF PROCEDURE: Ria Warren was brought into the endoscopy suite and placed in the left lateral decubitus position. After preprocedural pause and attended monitored anesthesia was administered, the external anus was inspected and was normal. Digital rectal exam was normal. The colonoscope was inserted and advanced under direct visualization to the level of the cecum which was identified by the appendiceal orifice and the ileocecal valve. The prep was excellent.. Upon slow withdrawal of the colonoscope, approximately 95% of the mucosa was directly visualized. There was moderate sigmoid diverticulosis.  The rest of the colon was without mucosal abnormality. There was no evidence of further polyps, inflammation, bleeding or AVMs. Retroflexion of the rectum was normal. The extra air was removed from the colon, and the colonoscope withdrawn. The patient tolerated the procedure well and was taken to postanesthesia care unit.     We invite the patient to return in 5 years for follow up screening evaluation.    Abner Brown MD

## 2023-12-26 ENCOUNTER — PATIENT OUTREACH (OUTPATIENT)
Dept: GASTROENTEROLOGY | Facility: CLINIC | Age: 73
End: 2023-12-26
Payer: MEDICARE

## 2024-04-04 ENCOUNTER — TELEPHONE (OUTPATIENT)
Dept: OTOLARYNGOLOGY | Facility: OTHER | Age: 74
End: 2024-04-04

## 2024-04-04 DIAGNOSIS — E04.1 THYROID NODULE: Primary | ICD-10-CM

## 2024-04-04 NOTE — TELEPHONE ENCOUNTER
Spoke to patient via phone regarding upcoming appointment. Ria, would like to cancel appointment 4/5/24 with Kendra Carrasco, due to schedule conflict.  Patient agreed to have a Thyroid ultrasound done and then follow up with Dr. Rojas on 4/11/24.  Patient does not wish to have a FNA of thyroid nodule at this time, as suggested by, Dr. Rojas.  Patient reported she has had one done in the past had swelling/breathing issues.  She reported she has been having sinus problems. Coughs up tons of yellow phlegm that feels like it chokes her. Having issues with swallowing.   Left ear pain, radiates down to her neck and throat. Ria, has gone to [facility] in the past to see a Specialist for her thyroid.  She reported, the  no longer works there and she has not gone back for 3-4 years. She is aware someone will call her to schedule the thyroid ultrasound.

## 2024-04-09 ENCOUNTER — HOSPITAL ENCOUNTER (OUTPATIENT)
Dept: ULTRASOUND IMAGING | Facility: HOSPITAL | Age: 74
Discharge: HOME | End: 2024-04-09
Attending: OTOLARYNGOLOGY | Admitting: OTOLARYNGOLOGY
Payer: MEDICARE

## 2024-04-09 DIAGNOSIS — E04.1 THYROID NODULE: ICD-10-CM

## 2024-04-09 PROCEDURE — 76536 US EXAM OF HEAD AND NECK: CPT

## 2024-04-09 NOTE — PROGRESS NOTES
Otolaryngology Consultation    Patient: Ria Warren  : 1950    Patient presents with:  Ent Problem  Ear Problem: Thyroid goiter- sinus, throat and ear issues- Tere Barrett CNP referring      HPI:  Ria Warren is a 73 year old female seen today at the request of Emmy Barrett  for evaluation of a thyroid nodule and nasal concerns.      She has been going to [place] for a multinodular goiter.  Formerly on synthroid, stopped by endocrine several years ago.  Significant family hx of thyroid goiter paternal side.  She recalls that her grandmother travelled to [place] for a thyroidectomy by train.   Her brother is also considering thyroidectomy due to goiter.     No family hx of thyroid cancer.  No personal history of high dose radiation exposure.    Distant fna at about 20 yoa, with described post procedure hemorrhage with shortness of breath and swelling.     She notes compressive throat symptoms when supine, but this has been present for years.    She has noted progressive solid worse than liquid dysphagia.  She denies problematic heartburn    No weight loss.  She has had weight gain occasional hot and cold intolerance night sweats and hair loss    No dysphonia    No new onset tremor    She has had an irritated throat for over one year, describing it as just not feeling right.  Over the past week she developed a severe sore throat.   No known fever.  Mild nausea.    She also has left ear sensitivity since developing covid in .  She notes left hearing loss, no bothersome tinnitus or vertigo.       The thyroid ultrasound was personally reviewed.  Dated 2023 which shows a left larger than right multinodular goiter.  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      Thyroid labs were reviewed.  TSH None recent normal on 3/9/2022 at 0.77.    Calcium dated 2023 is 9.7      Retired teacher    Current Outpatient Rx   Medication Sig Dispense Refill    Calcium Carb-Cholecalciferol (CALCIUM 600 + D PO)       cholecalciferol (VITAMIN D3) 25 mcg (1000 units) capsule Take 1 capsule by mouth daily      multivitamin w/minerals (MULTI-VITAMIN) tablet Take 1 tablet by mouth daily         Allergies: Azithromycin, Eggshell membrane (chicken) [egg shells], and Penicillins     Past Medical History:   Diagnosis Date    Asymptomatic menopausal state     No Comments Provided    Bitten by cat     ,Hospitalized    Encounter for full-term uncomplicated delivery      2, Para 1with 1 spontaneous     Encounter for screening for malignant neoplasm of colon     No Comments Provided    Generalized anxiety disorder     No Comments Provided    Low back pain     10/10/2006,Right. Under investigation    Lower abdominal pain     10/10/2006,mid to lower quadrant. Under investigation    Nontoxic goiter     No Comments Provided    Other chest pain     No Comments Provided    Uterine mass        Past Surgical History:   Procedure Laterality Date    APPENDECTOMY      . Surgeon told her she removed her appendix when she had her hysterectomy    COLONOSCOPY      2012    COLONOSCOPY  2017,follow up  tubular adenoma    COLONOSCOPY N/A 2023    Procedure: COLONOSCOPY;  Surgeon: Abner Brown MD;  Location: [facility]  "OR    HYSTERECTOMY  2022    OTHER SURGICAL HISTORY      52124.0,PAST SURGICAL HISTORY,2004 Hospitalized for cat bite~1990 Lymph node resection x 3 at left axilla for benign disease~Notes prior complications from anesthesia:    TONSILLECTOMY      No Comments Provided       ENT family history reviewed    Social History     Tobacco Use    Smoking status: Never    Smokeless tobacco: Never   Substance Use Topics    Alcohol use: Yes     Alcohol/week: 1.0 standard drink of alcohol     Types: 1 Glasses of wine per week    Drug use: Never     Comment: Drug use: No       Review of Systems  ROS: 10 point ROS neg other than the symptoms noted above in the HPI and hot and cold intolerance night sweats fever chills hair loss neck pain blurred vision eye floaters heartburn diarrhea, chest pain    Physical Exam  /79 (BP Location: Right arm, Patient Position: Sitting, Cuff Size: Adult Large)   Pulse 76   Temp 98  F (36.7  C) (Temporal)   Resp 18   Ht 1.549 m (5' 0.98\")   Wt 72 kg (158 lb 11.7 oz)   LMP  (LMP Unknown)   SpO2 98%   BMI 30.01 kg/m      General - The patient is well nourished and well developed, and appears to have good nutritional status.  Alert and oriented to person and place, answers questions and cooperates with examination appropriately.    Head and Face - Normocephalic and atraumatic, with no gross asymmetry noted.  The facial nerve is intact, with strong symmetric movements.  Voice and Breathing - The patient was breathing comfortably without the use of accessory muscles. There was no wheezing, stridor, or stertor.  The patients voice was clear and strong, and had appropriate pitch and quality.  Ears - examined under microscopy bilaterally, canals excoriated second Q-tip use.  Cerumen removed from the left canal with an alligator .  The external auditory canals are patent, the tympanic membranes are intact without effusion, retraction or mass.  Bony landmarks are intact.  Eyes - Extraocular " movements intact, and the pupils were reactive to light.  Sclera were not icteric or injected, conjunctiva were pink and moist.  Mouth - Examination of the oral cavity showed pink, healthy oral mucosa. No lesions or ulcerations noted.  The tongue was mobile and midline, and the dentition were in good condition.    Throat - The walls of the oropharynx were smooth, pink, moist, symmetric, and had no lesions or ulcerations.  The tonsillar pillars and soft palate were symmetric.  The uvula was midline on elevation.  Tonsillar fossa clear.  No mass or exudates, mild erythema bilateral fossa  Neck -   Visible and palpable bilateral goiter with a large left nodule over 4 cm.   Multiple nodules palpated in the left and right lobes, none are grossly fixed.  Nontender.  Palpation of the occipital, submental, submandibular, internal jugular chain, and supraclavicular nodes did not demonstrate any abnormal lymph nodes but the central neck palpation is limited due to goiter.    The trachea was mobile and deviated right.   Nose - External contour is symmetric, no gross deflection or scars.  Nasal mucosa is pink and moist with no abnormal mucus.  The septum was intact, turbinates of normal size and position.  No polyps, masses, or purulence noted on examination.    Attempts at mirror laryngoscopy were not possible due to gag reflex.  Therefore I proceeded with a fiberoptic examination after informed consent.  First I sprayed both sides of the nose with a mixture of lidocaine and neosynephrine.  I then passed the scope through the nasal cavity.  The nasal cavity was unremarkable.  The nasopharynx was mucosally covered and symmetric.  The eustachian tube openings were unobstructed.  Going further, the base of tongue was free of lesions, and the vallecula was open.  The epiglottis was smooth and mucosally covered.  The supraglottic larynx was then clearly visualized.  The vocal cords moved smoothly and symmetrically.  Photos in epic.  The pyriform sinuses were open and without wilma mass or pooling of secretions, and the limited view of the subglottis did not show any lesions or stenosis.  The patient tolerated the procedure well.      Impression and Plan- Ria Warren is a 73 year old female with:    ICD-10-CM    1. Thyroid nodule  E04.1 lidocaine 2%-oxymetazoline 0.025% nasal solution 2 spray      2. Acute pharyngitis, unspecified etiology  J02.9 Group A Streptococcus PCR Throat Swab      3. Bilateral thyroid goiter  E04.9 Adult ENT  Referral     TSH with free T4 reflex      4. Esophageal dysphagia  R13.19       5. Hearing loss of left ear, unspecified hearing loss type  H91.92             Fnab left 2.6 cm nodule, right 1.5 cm nodule recommended, she declined    TSH with reflex    Follow-up with Dr. Bruce at the [facility] for total thyroidectomy.  She is interested in surgery.      Follow-up for audiogram.  Normal ear exam.     I will call in antibiotics if indicated based on strep culture    Thyroid labs are pending.      Thyroid anatomy was discussed, and thyroid surgery was briefly discussed.      Stop q tips    Total exam time spent on the day of the visit including review of the chart, reviewing pertinent prior imaging and notes, obtaining a detailed history and physical exam, education, discussion with the patient and documenting in epic was over 60 minutes .  This did not include procedure time.      Ayesha Rojas D.O.  Otolaryngology/Head and Neck Surgery  Allergy

## 2024-04-11 ENCOUNTER — OFFICE VISIT (OUTPATIENT)
Dept: OTOLARYNGOLOGY | Facility: OTHER | Age: 74
End: 2024-04-11
Attending: OTOLARYNGOLOGY
Payer: MEDICARE

## 2024-04-11 VITALS
DIASTOLIC BLOOD PRESSURE: 79 MMHG | BODY MASS INDEX: 29.97 KG/M2 | OXYGEN SATURATION: 98 % | RESPIRATION RATE: 18 BRPM | WEIGHT: 158.73 LBS | TEMPERATURE: 98 F | HEIGHT: 61 IN | HEART RATE: 76 BPM | SYSTOLIC BLOOD PRESSURE: 120 MMHG

## 2024-04-11 DIAGNOSIS — E04.1 THYROID NODULE: Primary | ICD-10-CM

## 2024-04-11 DIAGNOSIS — E04.9 GOITER: ICD-10-CM

## 2024-04-11 DIAGNOSIS — J02.9 ACUTE PHARYNGITIS, UNSPECIFIED ETIOLOGY: ICD-10-CM

## 2024-04-11 DIAGNOSIS — R13.19 ESOPHAGEAL DYSPHAGIA: ICD-10-CM

## 2024-04-11 DIAGNOSIS — H91.92 HEARING LOSS OF LEFT EAR, UNSPECIFIED HEARING LOSS TYPE: ICD-10-CM

## 2024-04-11 LAB — GROUP A STREP BY PCR: NOT DETECTED

## 2024-04-11 PROCEDURE — 99205 OFFICE O/P NEW HI 60 MIN: CPT | Mod: 25 | Performed by: OTOLARYNGOLOGY

## 2024-04-11 PROCEDURE — 92504 EAR MICROSCOPY EXAMINATION: CPT | Performed by: OTOLARYNGOLOGY

## 2024-04-11 PROCEDURE — 31575 DIAGNOSTIC LARYNGOSCOPY: CPT | Performed by: OTOLARYNGOLOGY

## 2024-04-11 PROCEDURE — G0463 HOSPITAL OUTPT CLINIC VISIT: HCPCS | Mod: 25

## 2024-04-11 PROCEDURE — 87651 STREP A DNA AMP PROBE: CPT | Mod: ZL | Performed by: OTOLARYNGOLOGY

## 2024-04-11 RX ORDER — POLYETHYLENE GLYCOL-3350 AND ELECTROLYTES 236; 6.74; 5.86; 2.97; 22.74 G/274.31G; G/274.31G; G/274.31G; G/274.31G; G/274.31G
POWDER, FOR SOLUTION ORAL
COMMUNITY
Start: 2023-09-28

## 2024-04-11 ASSESSMENT — PAIN SCALES - GENERAL: PAINLEVEL: EXTREME PAIN (9)

## 2024-04-11 NOTE — LETTER
2024         RE: Ria Warren  [address]  [address]  [place] [address]        Dear Colleague,    Thank you for referring your patient, Ria Warren, to the [facility]. Please see a copy of my visit note below.      Otolaryngology Consultation    Patient: Ria Warren  : 1950    Patient presents with:  Ent Problem  Ear Problem: Thyroid goiter- sinus, throat and ear issues- Tere Barrett CNP referring      HPI:  Ria Warren is a 73 year old female seen today at the request of Emmy Barrett  for evaluation of a thyroid nodule and nasal concerns.      She has been going to [place] for a multinodular goiter.  Formerly on synthroid, stopped by endocrine several years ago.  Significant family hx of thyroid goiter paternal side.  She recalls that her grandmother travelled to [place] for a thyroidectomy by train.   Her brother is also considering thyroidectomy due to goiter.     No family hx of thyroid cancer.  No personal history of high dose radiation exposure.    Distant fna at about 20 yoa, with described post procedure hemorrhage with shortness of breath and swelling.     She notes compressive throat symptoms when supine, but this has been present for years.    She has noted progressive solid worse than liquid dysphagia.  She denies problematic heartburn    No weight loss.  She has had weight gain occasional hot and cold intolerance night sweats and hair loss    No dysphonia    No new onset tremor    She has had an irritated throat for over one year, describing it as just not feeling right.  Over the past week she developed a severe sore throat.   No known fever.  Mild nausea.    She also has left ear sensitivity since developing covid in .  She notes left hearing loss, no bothersome tinnitus or vertigo.       The thyroid ultrasound was personally reviewed.  Dated 2023 which shows a left larger than right multinodular goiter.  Left  Lobe:  multiple nodules, suspicious include    Lower lobe 5.4 cm mixed TR2  Left mid lobe 2.6 cm, mixed, ill defined, TR4    Right lobe:  Inferior 2.0, TR2  Inferior lobe 1.5 cm TR5, solid  Mid lobe 2.6 cm mixed, TR4        CT neck dated 2023 excision of a large multinodular goiter with a substernal extension on the left causing right tracheal deviation    CT neck shows a large left multinodular goiter with significant substernal extension and significant tracheal deviation to the right there is also narrowing of the esophagus secondary to mass effect  Calcifications are noted within the left gland    there is a right multinodular goiter   no concerning lymphadenopathy   the remainder of the CT is negative.      Thyroid labs were reviewed.  TSH None recent normal on 3/9/2022 at 0.77.    Calcium dated 2023 is 9.7      Retired teacher    Current Outpatient Rx   Medication Sig Dispense Refill     Calcium Carb-Cholecalciferol (CALCIUM 600 + D PO)        cholecalciferol (VITAMIN D3) 25 mcg (1000 units) capsule Take 1 capsule by mouth daily       multivitamin w/minerals (MULTI-VITAMIN) tablet Take 1 tablet by mouth daily         Allergies: Azithromycin, Eggshell membrane (chicken) [egg shells], and Penicillins     Past Medical History:   Diagnosis Date     Asymptomatic menopausal state     No Comments Provided     Bitten by cat     ,Hospitalized     Encounter for full-term uncomplicated delivery      2, Para 1with 1 spontaneous      Encounter for screening for malignant neoplasm of colon     No Comments Provided     Generalized anxiety disorder     No Comments Provided     Low back pain     10/10/2006,Right. Under investigation     Lower abdominal pain     10/10/2006,mid to lower quadrant. Under investigation     Nontoxic goiter     No Comments Provided     Other chest pain     No Comments Provided     Uterine mass        Past Surgical History:   Procedure Laterality Date     APPENDECTOMY       "2022. Surgeon told her she removed her appendix when she had her hysterectomy     COLONOSCOPY      9/13/2012     COLONOSCOPY  05/17/2017 05/17/2017,follow up 2022 tubular adenoma     COLONOSCOPY N/A 9/29/2023    Procedure: COLONOSCOPY;  Surgeon: Abner Brown MD;  Location: [facility]     HYSTERECTOMY  2022     OTHER SURGICAL HISTORY      77572.0,PAST SURGICAL HISTORY,2004 Hospitalized for cat bite~1990 Lymph node resection x 3 at left axilla for benign disease~Notes prior complications from anesthesia:     TONSILLECTOMY      No Comments Provided       ENT family history reviewed    Social History     Tobacco Use     Smoking status: Never     Smokeless tobacco: Never   Substance Use Topics     Alcohol use: Yes     Alcohol/week: 1.0 standard drink of alcohol     Types: 1 Glasses of wine per week     Drug use: Never     Comment: Drug use: No       Review of Systems  ROS: 10 point ROS neg other than the symptoms noted above in the HPI and hot and cold intolerance night sweats fever chills hair loss neck pain blurred vision eye floaters heartburn diarrhea, chest pain    Physical Exam  /79 (BP Location: Right arm, Patient Position: Sitting, Cuff Size: Adult Large)   Pulse 76   Temp 98  F (36.7  C) (Temporal)   Resp 18   Ht 1.549 m (5' 0.98\")   Wt 72 kg (158 lb 11.7 oz)   LMP  (LMP Unknown)   SpO2 98%   BMI 30.01 kg/m      General - The patient is well nourished and well developed, and appears to have good nutritional status.  Alert and oriented to person and place, answers questions and cooperates with examination appropriately.    Head and Face - Normocephalic and atraumatic, with no gross asymmetry noted.  The facial nerve is intact, with strong symmetric movements.  Voice and Breathing - The patient was breathing comfortably without the use of accessory muscles. There was no wheezing, stridor, or stertor.  The patients voice was clear and strong, and had appropriate pitch and quality.  Ears - examined " under microscopy bilaterally, canals excoriated second Q-tip use.  Cerumen removed from the left canal with an alligator .  The external auditory canals are patent, the tympanic membranes are intact without effusion, retraction or mass.  Bony landmarks are intact.  Eyes - Extraocular movements intact, and the pupils were reactive to light.  Sclera were not icteric or injected, conjunctiva were pink and moist.  Mouth - Examination of the oral cavity showed pink, healthy oral mucosa. No lesions or ulcerations noted.  The tongue was mobile and midline, and the dentition were in good condition.    Throat - The walls of the oropharynx were smooth, pink, moist, symmetric, and had no lesions or ulcerations.  The tonsillar pillars and soft palate were symmetric.  The uvula was midline on elevation.  Tonsillar fossa clear.  No mass or exudates, mild erythema bilateral fossa  Neck -   Visible and palpable bilateral goiter with a large left nodule over 4 cm.   Multiple nodules palpated in the left and right lobes, none are grossly fixed.  Nontender.  Palpation of the occipital, submental, submandibular, internal jugular chain, and supraclavicular nodes did not demonstrate any abnormal lymph nodes but the central neck palpation is limited due to goiter.    The trachea was mobile and deviated right.   Nose - External contour is symmetric, no gross deflection or scars.  Nasal mucosa is pink and moist with no abnormal mucus.  The septum was intact, turbinates of normal size and position.  No polyps, masses, or purulence noted on examination.    Attempts at mirror laryngoscopy were not possible due to gag reflex.  Therefore I proceeded with a fiberoptic examination after informed consent.  First I sprayed both sides of the nose with a mixture of lidocaine and neosynephrine.  I then passed the scope through the nasal cavity.  The nasal cavity was unremarkable.  The nasopharynx was mucosally covered and symmetric.  The eustachian  tube openings were unobstructed.  Going further, the base of tongue was free of lesions, and the vallecula was open.  The epiglottis was smooth and mucosally covered.  The supraglottic larynx was then clearly visualized.  The vocal cords moved smoothly and symmetrically.  Photos in epic. The pyriform sinuses were open and without wilma mass or pooling of secretions, and the limited view of the subglottis did not show any lesions or stenosis.  The patient tolerated the procedure well.      Impression and Plan- Ria Warren is a 73 year old female with:    ICD-10-CM    1. Thyroid nodule  E04.1 lidocaine 2%-oxymetazoline 0.025% nasal solution 2 spray      2. Acute pharyngitis, unspecified etiology  J02.9 Group A Streptococcus PCR Throat Swab      3. Bilateral thyroid goiter  E04.9 Adult ENT  Referral     TSH with free T4 reflex      4. Esophageal dysphagia  R13.19       5. Hearing loss of left ear, unspecified hearing loss type  H91.92             Fnab left 2.6 cm nodule, right 1.5 cm nodule recommended, she declined    TSH with reflex    Follow-up with Dr. Bruce at the [facility] for [facility] thyroidectomy.  She is interested in surgery.      Follow-up for audiogram.  Normal ear exam.     I will call in antibiotics if indicated based on strep culture    Thyroid labs are pending.      Thyroid anatomy was discussed, and thyroid surgery was briefly discussed.      Stop q tips    Total exam time spent on the day of the visit including review of the chart, reviewing pertinent prior imaging and notes, obtaining a detailed history and physical exam, education, discussion with the patient and documenting in epic was over 60 minutes .  This did not include procedure time.      Ayesha Rojas D.O.  Otolaryngology/Head and Neck Surgery  Allergy          Again, thank you for allowing me to participate in the care of your patient.        Sincerely,        Ayesha Rojas MD

## 2024-04-11 NOTE — PATIENT INSTRUCTIONS
Thank you for allowing Dr. Rojas and our ENT team to participate in your care.  If your medications are too expensive, please give the nurse a call.  We can possibly change this medication.  If you have a scheduling or an appointment question please contact our Health Unit Coordinator at their direct line [phone].   ALL nursing questions or concerns can be directed to your ENT nurse, Ulices, at: [phone]    Follow-up with Dr. Bruce at the [facility].   May also contact Dr. Emeka Tellez Endocrine surgeon  Follow-up for audiogram  Follow-up for barium swallow for esophageal dysphagia   I will call in antibiotics if indicated based on strep culture

## 2024-04-16 ENCOUNTER — HOSPITAL ENCOUNTER (EMERGENCY)
Facility: HOSPITAL | Age: 74
Discharge: HOME | End: 2024-04-16
Payer: MEDICARE

## 2024-04-16 VITALS
RESPIRATION RATE: 18 BRPM | WEIGHT: 166.4 LBS | OXYGEN SATURATION: 97 % | HEIGHT: 61 IN | TEMPERATURE: 97.6 F | HEART RATE: 79 BPM | BODY MASS INDEX: 31.42 KG/M2 | SYSTOLIC BLOOD PRESSURE: 125 MMHG | DIASTOLIC BLOOD PRESSURE: 83 MMHG

## 2024-04-16 DIAGNOSIS — H66.91 ACUTE RIGHT OTITIS MEDIA: ICD-10-CM

## 2024-04-16 DIAGNOSIS — B33.8 RSV INFECTION: ICD-10-CM

## 2024-04-16 LAB
FLUAV RNA SPEC QL NAA+PROBE: NEGATIVE
FLUBV RNA RESP QL NAA+PROBE: NEGATIVE
GROUP A STREP BY PCR: NOT DETECTED
RSV RNA SPEC NAA+PROBE: POSITIVE
SARS-COV-2 RNA RESP QL NAA+PROBE: NEGATIVE

## 2024-04-16 PROCEDURE — 99213 OFFICE O/P EST LOW 20 MIN: CPT

## 2024-04-16 PROCEDURE — 87637 SARSCOV2&INF A&B&RSV AMP PRB: CPT

## 2024-04-16 PROCEDURE — G0463 HOSPITAL OUTPT CLINIC VISIT: HCPCS

## 2024-04-16 PROCEDURE — 87651 STREP A DNA AMP PROBE: CPT

## 2024-04-16 RX ORDER — CEFDINIR 300 MG/1
300 CAPSULE ORAL 2 TIMES DAILY
Qty: 14 CAPSULE | Refills: 0 | Status: SHIPPED | OUTPATIENT
Start: 2024-04-16 | End: 2024-04-23

## 2024-04-16 ASSESSMENT — ENCOUNTER SYMPTOMS
FATIGUE: 1
SHORTNESS OF BREATH: 0
COUGH: 1
ACTIVITY CHANGE: 1
SORE THROAT: 1
FEVER: 1

## 2024-04-16 NOTE — ED PROVIDER NOTES
"  History     Chief Complaint   Patient presents with    Pharyngitis    Fatigue    Generalized Body Aches    Cough     HPI  Ria Warren is a 73 year old female who presents to the urgent care with a 7 day history of congestion, facial pressure, cough, sore throat, and fatigue. Also has developed bilateral ear pain, worse on the right. She denies chest pain, shortness of breath, and n/v/d. No recent abx or OTC medications. Non smoker. No hx of asthma or COPD.    Allergies:  Allergies   Allergen Reactions    Azithromycin      Other reaction(s): Vomiting    Eggshell Membrane (Chicken) [Egg Shells] Other (See Comments)     Tingling in chest after eating eggs and diarrhea     Penicillins      Other reaction(s): *Unknown  Any \"cillins\" Skin burn from inside out /told to not use         Problem List:    Patient Active Problem List    Diagnosis Date Noted    OME (otitis media with effusion), right 01/23/2020     Priority: Medium    Chronic sinusitis, unspecified location 01/23/2020     Priority: Medium    Special screening for malignant neoplasms, colon 05/16/2017     Priority: Medium    Irritable bowel syndrome 04/27/2017     Priority: Medium    Seasonal allergic rhinitis 04/27/2017     Priority: Medium    Multiple thyroid nodules 04/06/2015     Priority: Medium     Overview:   Seen by Endocrine 4/15, recommend annual exam and ultrasound      KYIA (generalized anxiety disorder) 03/05/2015     Priority: Medium    Atrophic vaginitis 05/03/2012     Priority: Medium        Past Medical History:    Past Medical History:   Diagnosis Date    Asymptomatic menopausal state     Bitten by cat     Encounter for full-term uncomplicated delivery     Encounter for screening for malignant neoplasm of colon     Generalized anxiety disorder     Low back pain     Lower abdominal pain     Nontoxic goiter     Other chest pain     Uterine mass        Past Surgical History:    Past Surgical History:   Procedure Laterality Date    " APPENDECTOMY      2022. Surgeon told her she removed her appendix when she had her hysterectomy    COLONOSCOPY      9/13/2012    COLONOSCOPY  05/17/2017 05/17/2017,follow up 2022 tubular adenoma    COLONOSCOPY N/A 9/29/2023    Procedure: COLONOSCOPY;  Surgeon: Abner Brown MD;  Location: [facility]    HYSTERECTOMY  2022    OTHER SURGICAL HISTORY      47532.0,PAST SURGICAL HISTORY,2004 Hospitalized for cat bite~1990 Lymph node resection x 3 at left axilla for benign disease~Notes prior complications from anesthesia:    TONSILLECTOMY      No Comments Provided       Family History:    Family History   Problem Relation Age of Onset    Breast Cancer Mother         Cancer-breast,50's    Colon Cancer Maternal Grandfather         Cancer-colon,Colon    Diabetes Maternal Grandfather         Diabetes    Heart Failure Maternal Grandmother         Heart failure    Other - See Comments Other         Maternal side heart disease    Thyroid Disease Other         Thyroid Disease,multiple women with goiters    Asthma Father     Asthma Paternal Uncle     Allergies Daughter     Blood Disease No family hx of         Blood Disease,no blood clotting disorders    Ovarian Cancer No family hx of         Cancer-ovarian       Social History:  Marital Status:   [2]  Social History     Tobacco Use    Smoking status: Never    Smokeless tobacco: Never   Substance Use Topics    Alcohol use: Yes     Alcohol/week: 1.0 standard drink of alcohol     Types: 1 Glasses of wine per week    Drug use: Never     Comment: Drug use: No        Medications:    cefdinir (OMNICEF) 300 MG capsule  Calcium Carb-Cholecalciferol (CALCIUM 600 + D PO)  cholecalciferol (VITAMIN D3) 25 mcg (1000 units) capsule  GAVILYTE-G 236 g suspension  GENTLE LAXATIVE 5 MG EC tablet  multivitamin w/minerals (MULTI-VITAMIN) tablet          Review of Systems   Constitutional:  Positive for activity change, fatigue and fever.   HENT:  Positive for ear pain and sore throat.   "  Respiratory:  Positive for cough. Negative for shortness of breath.    Cardiovascular:  Negative for chest pain.   All other systems reviewed and are negative.      Physical Exam   BP: 125/83  Pulse: 79  Temp: 97.6  F (36.4  C)  Resp: 18  Height: 154.9 cm (5' 1\")  Weight: 75.5 kg (166 lb 6.4 oz)  SpO2: 97 %      Physical Exam  Vitals and nursing note reviewed.   Constitutional:       General: She is not in acute distress.     Appearance: She is well-developed. She is ill-appearing. She is not toxic-appearing.   HENT:      Right Ear: A middle ear effusion is present. Tympanic membrane is erythematous.      Left Ear: Tympanic membrane is not erythematous.      Mouth/Throat:      Pharynx: Posterior oropharyngeal erythema present. No oropharyngeal exudate.   Cardiovascular:      Rate and Rhythm: Normal rate and regular rhythm.      Heart sounds: Normal heart sounds.   Pulmonary:      Effort: Pulmonary effort is normal. No respiratory distress.      Breath sounds: Normal breath sounds. No wheezing, rhonchi or rales.   Lymphadenopathy:      Cervical: Cervical adenopathy present.   Neurological:      Mental Status: She is alert.         ED Course        Procedures        Results for orders placed or performed during the hospital encounter of 04/16/24 (from the past 24 hour(s))   Symptomatic Influenza A/B, RSV, & SARS-CoV2 PCR (COVID-19) Nasopharyngeal    Specimen: Nasopharyngeal; Swab   Result Value Ref Range    Influenza A PCR Negative Negative    Influenza B PCR Negative Negative    RSV PCR Positive (A) Negative    SARS CoV2 PCR Negative Negative    Narrative    Testing was performed using the Xpert Xpress CoV2/Flu/RSV Assay on the [employer] Gene[employer]pert Instrument. This test should be ordered for the detection of SARS-CoV-2, influenza, and RSV viruses in individuals who meet clinical and/or epidemiological criteria. Test performance is unknown in asymptomatic patients. This test is for in vitro diagnostic use under the FDA " EUA for laboratories certified under CLIA to perform high or moderate complexity testing. This test has not been FDA cleared or approved. A negative result does not rule out the presence of PCR inhibitors in the specimen or target RNA in concentration below the limit of detection for the assay. If only one viral target is positive but coinfection with multiple targets is suspected, the sample should be re-tested with another FDA cleared, approved, or authorized test, if coinfection would change clinical management. This test was validated by the [facility]. These laboratories are certified under the Clinical Laboratory Improvement Amendments of 1988 (CLIA-88) as qualified to perform high complexity laboratory testing.   Group A Streptococcus PCR Throat Swab    Specimen: Throat; Swab   Result Value Ref Range    Group A strep by PCR Not Detected Not Detected    Narrative    The Xpert Xpress Strep A test, performed on the [employer], is a rapid, qualitative in vitro diagnostic test for the detection of Streptococcus pyogenes (Group A ß-hemolytic Streptococcus, Strep A) in throat swab specimens from patients with signs and symptoms of pharyngitis. The Xpert Xpress Strep A test can be used as an aid in the diagnosis of Group A Streptococcal pharyngitis. The assay is not intended to monitor treatment for Group A Streptococcus infections. The Xpert Xpress Strep A test utilizes an automated real-time polymerase chain reaction (PCR) to detect Streptococcus pyogenes DNA.       Medications - No data to display    Assessments & Plan (with Medical Decision Making)     I have reviewed the nursing notes.    I have reviewed the findings, diagnosis, plan and need for follow up with the patient.  Ria Warren is a 73 year old female who presents to the urgent care with a 7 day history of congestion, facial pressure, cough, sore throat, and fatigue. Also has developed bilateral ear pain,  worse on the right. She denies chest pain, shortness of breath, and n/v/d. No recent abx or OTC medications. Non smoker. No hx of asthma or COPD.    MDM: vital signs normal, afebrile. Lungs clear, heart tones regular. Able to speak in complete sentences without difficulty. Erythema to right TM. No mastoid tenderness or erythema. Non toxic in appearance with no noted distress. RSV positive. Strep  negative. Cefdinir prescribed for right otitis media. Supportive measures and return precautions discussed. She is in agreement with plan.     (H66.91) Acute right otitis media, (B33.8) RSV infection  Plan: Antibiotics 2 times daily for 7 days. Yogurt or probiotic while taking. Complete all antibiotics.   Push fluids.   Tylenol and ibuprofen as needed.   Return with any chest pain, shortness of breath, or other concerns.   Follow up in the clinic as needed. Understanding verbalized.       Discharge Medication List as of 4/16/2024 11:29 AM        START taking these medications    Details   cefdinir (OMNICEF) 300 MG capsule Take 1 capsule (300 mg) by mouth 2 times daily for 7 days, Disp-14 capsule, R-0, E-Prescribe             Final diagnoses:   Acute right otitis media   RSV infection       4/16/2024   [facility]       Eva Law NP  04/16/24 1213

## 2024-04-16 NOTE — ED TRIAGE NOTES
Pt presents with c/o sore throat, fatigue, and body aches. Sx started a week ago. Denies known exposures. Denies hx COPD, asthma. Endorses hx of seasonal allergies. No otc meds.

## 2024-04-16 NOTE — DISCHARGE INSTRUCTIONS
Antibiotics 2 times daily for 7 days. Yogurt or probiotic while taking. Complete all antibiotics.   Push fluids.   Tylenol and ibuprofen as needed.   Return with any chest pain, shortness of breath, or other concerns.   Follow up in the clinic as needed.

## 2024-05-06 ENCOUNTER — APPOINTMENT (OUTPATIENT)
Dept: CT IMAGING | Facility: HOSPITAL | Age: 74
End: 2024-05-06
Attending: PHYSICIAN ASSISTANT
Payer: MEDICARE

## 2024-05-06 ENCOUNTER — HOSPITAL ENCOUNTER (EMERGENCY)
Facility: HOSPITAL | Age: 74
Discharge: HOME | End: 2024-05-06
Attending: PHYSICIAN ASSISTANT | Admitting: PHYSICIAN ASSISTANT
Payer: MEDICARE

## 2024-05-06 ENCOUNTER — APPOINTMENT (OUTPATIENT)
Dept: MRI IMAGING | Facility: HOSPITAL | Age: 74
End: 2024-05-06
Attending: PHYSICIAN ASSISTANT
Payer: MEDICARE

## 2024-05-06 VITALS
DIASTOLIC BLOOD PRESSURE: 78 MMHG | RESPIRATION RATE: 23 BRPM | OXYGEN SATURATION: 96 % | WEIGHT: 160.1 LBS | HEART RATE: 70 BPM | BODY MASS INDEX: 31.43 KG/M2 | HEIGHT: 60 IN | SYSTOLIC BLOOD PRESSURE: 132 MMHG | TEMPERATURE: 96.3 F

## 2024-05-06 DIAGNOSIS — R42 VERTIGO: ICD-10-CM

## 2024-05-06 LAB
ANION GAP SERPL CALCULATED.3IONS-SCNC: 11 MMOL/L (ref 7–15)
ATRIAL RATE - MUSE: 63 BPM
BASOPHILS # BLD AUTO: 0 10E3/UL (ref 0–0.2)
BASOPHILS NFR BLD AUTO: 0 %
BUN SERPL-MCNC: 16.8 MG/DL (ref 8–23)
CALCIUM SERPL-MCNC: 9.3 MG/DL (ref 8.8–10.2)
CHLORIDE SERPL-SCNC: 103 MMOL/L (ref 98–107)
CREAT SERPL-MCNC: 0.55 MG/DL (ref 0.51–0.95)
DEPRECATED HCO3 PLAS-SCNC: 27 MMOL/L (ref 22–29)
DIASTOLIC BLOOD PRESSURE - MUSE: NORMAL MMHG
EGFRCR SERPLBLD CKD-EPI 2021: >90 ML/MIN/1.73M2
EOSINOPHIL # BLD AUTO: 0.1 10E3/UL (ref 0–0.7)
EOSINOPHIL NFR BLD AUTO: 1 %
ERYTHROCYTE [DISTWIDTH] IN BLOOD BY AUTOMATED COUNT: 13 % (ref 10–15)
GLUCOSE SERPL-MCNC: 95 MG/DL (ref 70–99)
HCT VFR BLD AUTO: 37.9 % (ref 35–47)
HGB BLD-MCNC: 12.6 G/DL (ref 11.7–15.7)
HOLD SPECIMEN: NORMAL
HOLD SPECIMEN: NORMAL
IMM GRANULOCYTES # BLD: 0 10E3/UL
IMM GRANULOCYTES NFR BLD: 0 %
INTERPRETATION ECG - MUSE: NORMAL
LYMPHOCYTES # BLD AUTO: 2 10E3/UL (ref 0.8–5.3)
LYMPHOCYTES NFR BLD AUTO: 31 %
MAGNESIUM SERPL-MCNC: 1.9 MG/DL (ref 1.7–2.3)
MCH RBC QN AUTO: 30.4 PG (ref 26.5–33)
MCHC RBC AUTO-ENTMCNC: 33.2 G/DL (ref 31.5–36.5)
MCV RBC AUTO: 91 FL (ref 78–100)
MONOCYTES # BLD AUTO: 0.4 10E3/UL (ref 0–1.3)
MONOCYTES NFR BLD AUTO: 6 %
NEUTROPHILS # BLD AUTO: 4 10E3/UL (ref 1.6–8.3)
NEUTROPHILS NFR BLD AUTO: 62 %
NRBC # BLD AUTO: 0 10E3/UL
NRBC BLD AUTO-RTO: 0 /100
P AXIS - MUSE: 55 DEGREES
PLATELET # BLD AUTO: 211 10E3/UL (ref 150–450)
POTASSIUM SERPL-SCNC: 3.9 MMOL/L (ref 3.4–5.3)
PR INTERVAL - MUSE: 128 MS
QRS DURATION - MUSE: 84 MS
QT - MUSE: 440 MS
QTC - MUSE: 450 MS
R AXIS - MUSE: -47 DEGREES
RBC # BLD AUTO: 4.15 10E6/UL (ref 3.8–5.2)
SODIUM SERPL-SCNC: 141 MMOL/L (ref 135–145)
SYSTOLIC BLOOD PRESSURE - MUSE: NORMAL MMHG
T AXIS - MUSE: 50 DEGREES
TROPONIN T SERPL HS-MCNC: <6 NG/L
VENTRICULAR RATE- MUSE: 63 BPM
WBC # BLD AUTO: 6.4 10E3/UL (ref 4–11)

## 2024-05-06 PROCEDURE — 255N000002 HC RX 255 OP 636: Performed by: RADIOLOGY

## 2024-05-06 PROCEDURE — 96361 HYDRATE IV INFUSION ADD-ON: CPT

## 2024-05-06 PROCEDURE — 85025 COMPLETE CBC W/AUTO DIFF WBC: CPT | Performed by: PHYSICIAN ASSISTANT

## 2024-05-06 PROCEDURE — 70553 MRI BRAIN STEM W/O & W/DYE: CPT | Mod: MA,XS

## 2024-05-06 PROCEDURE — 93010 ELECTROCARDIOGRAM REPORT: CPT | Performed by: INTERNAL MEDICINE

## 2024-05-06 PROCEDURE — 250N000011 HC RX IP 250 OP 636: Performed by: PHYSICIAN ASSISTANT

## 2024-05-06 PROCEDURE — 36415 COLL VENOUS BLD VENIPUNCTURE: CPT | Performed by: PHYSICIAN ASSISTANT

## 2024-05-06 PROCEDURE — 70450 CT HEAD/BRAIN W/O DYE: CPT | Mod: MA,XS

## 2024-05-06 PROCEDURE — 83735 ASSAY OF MAGNESIUM: CPT | Performed by: PHYSICIAN ASSISTANT

## 2024-05-06 PROCEDURE — A9585 GADOBUTROL INJECTION: HCPCS | Performed by: RADIOLOGY

## 2024-05-06 PROCEDURE — 258N000003 HC RX IP 258 OP 636: Performed by: PHYSICIAN ASSISTANT

## 2024-05-06 PROCEDURE — 99284 EMERGENCY DEPT VISIT MOD MDM: CPT | Performed by: PHYSICIAN ASSISTANT

## 2024-05-06 PROCEDURE — 250N000011 HC RX IP 250 OP 636: Performed by: RADIOLOGY

## 2024-05-06 PROCEDURE — 93005 ELECTROCARDIOGRAM TRACING: CPT

## 2024-05-06 PROCEDURE — 99285 EMERGENCY DEPT VISIT HI MDM: CPT | Mod: 25

## 2024-05-06 PROCEDURE — 70496 CT ANGIOGRAPHY HEAD: CPT | Mod: MA

## 2024-05-06 PROCEDURE — 84484 ASSAY OF TROPONIN QUANT: CPT | Performed by: PHYSICIAN ASSISTANT

## 2024-05-06 PROCEDURE — 96375 TX/PRO/DX INJ NEW DRUG ADDON: CPT | Mod: XU

## 2024-05-06 PROCEDURE — 80048 BASIC METABOLIC PNL TOTAL CA: CPT | Performed by: PHYSICIAN ASSISTANT

## 2024-05-06 PROCEDURE — 96374 THER/PROPH/DIAG INJ IV PUSH: CPT | Mod: XU

## 2024-05-06 RX ORDER — MECLIZINE HYDROCHLORIDE 25 MG/1
25 TABLET ORAL 3 TIMES DAILY PRN
Qty: 20 TABLET | Refills: 0 | Status: SHIPPED | OUTPATIENT
Start: 2024-05-06

## 2024-05-06 RX ORDER — LORAZEPAM 2 MG/ML
0.5 INJECTION INTRAMUSCULAR ONCE
Status: COMPLETED | OUTPATIENT
Start: 2024-05-06 | End: 2024-05-06

## 2024-05-06 RX ORDER — DIPHENHYDRAMINE HYDROCHLORIDE 50 MG/ML
50 INJECTION INTRAMUSCULAR; INTRAVENOUS ONCE
Status: COMPLETED | OUTPATIENT
Start: 2024-05-06 | End: 2024-05-06

## 2024-05-06 RX ORDER — GADOBUTROL 604.72 MG/ML
7.5 INJECTION INTRAVENOUS ONCE
Status: COMPLETED | OUTPATIENT
Start: 2024-05-06 | End: 2024-05-06

## 2024-05-06 RX ORDER — IOPAMIDOL 755 MG/ML
67 INJECTION, SOLUTION INTRAVASCULAR ONCE
Status: COMPLETED | OUTPATIENT
Start: 2024-05-06 | End: 2024-05-06

## 2024-05-06 RX ORDER — ONDANSETRON 2 MG/ML
4 INJECTION INTRAMUSCULAR; INTRAVENOUS ONCE
Status: COMPLETED | OUTPATIENT
Start: 2024-05-06 | End: 2024-05-06

## 2024-05-06 RX ADMIN — ONDANSETRON 4 MG: 2 INJECTION INTRAMUSCULAR; INTRAVENOUS at 14:04

## 2024-05-06 RX ADMIN — LORAZEPAM 0.5 MG: 2 INJECTION INTRAMUSCULAR; INTRAVENOUS at 14:14

## 2024-05-06 RX ADMIN — GADOBUTROL 7.5 ML: 604.72 INJECTION INTRAVENOUS at 16:17

## 2024-05-06 RX ADMIN — SODIUM CHLORIDE 1000 ML: 9 INJECTION, SOLUTION INTRAVENOUS at 14:04

## 2024-05-06 RX ADMIN — IOPAMIDOL 67 ML: 755 INJECTION, SOLUTION INTRAVENOUS at 14:23

## 2024-05-06 RX ADMIN — DIPHENHYDRAMINE HYDROCHLORIDE 50 MG: 50 INJECTION, SOLUTION INTRAMUSCULAR; INTRAVENOUS at 14:16

## 2024-05-06 ASSESSMENT — ENCOUNTER SYMPTOMS
CHEST TIGHTNESS: 0
ABDOMINAL PAIN: 0
FEVER: 0
NAUSEA: 0
APPETITE CHANGE: 0
BRUISES/BLEEDS EASILY: 0
BACK PAIN: 0
DIZZINESS: 1
FACIAL ASYMMETRY: 0
WEAKNESS: 0
COUGH: 0
VOMITING: 0
ACTIVITY CHANGE: 0
HEADACHES: 0
SHORTNESS OF BREATH: 0
FATIGUE: 0
SPEECH DIFFICULTY: 0
LIGHT-HEADEDNESS: 0

## 2024-05-06 ASSESSMENT — ACTIVITIES OF DAILY LIVING (ADL)
ADLS_ACUITY_SCORE: 35

## 2024-05-06 ASSESSMENT — COLUMBIA-SUICIDE SEVERITY RATING SCALE - C-SSRS
6. HAVE YOU EVER DONE ANYTHING, STARTED TO DO ANYTHING, OR PREPARED TO DO ANYTHING TO END YOUR LIFE?: NO
1. IN THE PAST MONTH, HAVE YOU WISHED YOU WERE DEAD OR WISHED YOU COULD GO TO SLEEP AND NOT WAKE UP?: NO
2. HAVE YOU ACTUALLY HAD ANY THOUGHTS OF KILLING YOURSELF IN THE PAST MONTH?: NO

## 2024-05-06 NOTE — ED NOTES
Patient stated about 2 hours ago she became dizzy.  Reports never had vertigo before and some nausea along with it.  Pt given alcohol wipe to help ease the nausea which has helped.  Pt stated that it was hard to walk due to the dizziness   pt friend drove her here.  Pt is alert and ornt.  GCS 15.  Able to freely move all extremities without incident.

## 2024-05-06 NOTE — ED TRIAGE NOTES
Patient had sudden onset of dizziness. Chest tightness and and off balance. Mostly on the left side. States it is better if keeps eyes closed.  Both ears hurt. + BEFAST

## 2024-05-06 NOTE — DISCHARGE INSTRUCTIONS
Your workup is most consistent with peripheral vertigo.  I am glad you are feeling better.  I have prescribed some medications that will help for further issues.  Please follow-up in the clinic this week for recheck.  Please return here for any new or worsening symptoms or other questions or concerns.

## 2024-05-06 NOTE — ED PROVIDER NOTES
"  History     Chief Complaint   Patient presents with    Cough    Dizziness     HPI  Ria Warren is a 73 year old female who presented to the emergency department along with  for evaluation of an abrupt onset of dizziness.  Symptoms began while sitting at the table.  Complains of some bilateral ear discomfort.  Also has a cough from previous RSV.  No fevers.  No visual changes.  Symptoms significantly improved with shutting her eyes.  No falls.  No blood thinners.  No neck pain.  No difficulty speaking.  No unilateral weakness.    Allergies:  Allergies   Allergen Reactions    Azithromycin      Other reaction(s): Vomiting    Eggshell Membrane (Chicken) [Egg Shells] Other (See Comments)     Tingling in chest after eating eggs and diarrhea     Penicillins      Other reaction(s): *Unknown  Any \"cillins\" Skin burn from inside out /told to not use         Problem List:    Patient Active Problem List    Diagnosis Date Noted    OME (otitis media with effusion), right 01/23/2020     Priority: Medium    Chronic sinusitis, unspecified location 01/23/2020     Priority: Medium    Special screening for malignant neoplasms, colon 05/16/2017     Priority: Medium    Irritable bowel syndrome 04/27/2017     Priority: Medium    Seasonal allergic rhinitis 04/27/2017     Priority: Medium    Multiple thyroid nodules 04/06/2015     Priority: Medium     Overview:   Seen by Endocrine 4/15, recommend annual exam and ultrasound      KIYA (generalized anxiety disorder) 03/05/2015     Priority: Medium    Atrophic vaginitis 05/03/2012     Priority: Medium        Past Medical History:    Past Medical History:   Diagnosis Date    Asymptomatic menopausal state     Bitten by cat     Encounter for full-term uncomplicated delivery     Encounter for screening for malignant neoplasm of colon     Generalized anxiety disorder     Low back pain     Lower abdominal pain     Nontoxic goiter     Other chest pain     Uterine mass        Past " Surgical History:    Past Surgical History:   Procedure Laterality Date    APPENDECTOMY      2022. Surgeon told her she removed her appendix when she had her hysterectomy    COLONOSCOPY      9/13/2012    COLONOSCOPY  05/17/2017 05/17/2017,follow up 2022 tubular adenoma    COLONOSCOPY N/A 9/29/2023    Procedure: COLONOSCOPY;  Surgeon: Abnre Brown MD;  Location: [facility]    HYSTERECTOMY  2022    OTHER SURGICAL HISTORY      03247.0,PAST SURGICAL HISTORY,2004 Hospitalized for cat bite~1990 Lymph node resection x 3 at left axilla for benign disease~Notes prior complications from anesthesia:    TONSILLECTOMY      No Comments Provided       Family History:    Family History   Problem Relation Age of Onset    Breast Cancer Mother         Cancer-breast,50's    Colon Cancer Maternal Grandfather         Cancer-colon,Colon    Diabetes Maternal Grandfather         Diabetes    Heart Failure Maternal Grandmother         Heart failure    Other - See Comments Other         Maternal side heart disease    Thyroid Disease Other         Thyroid Disease,multiple women with goiters    Asthma Father     Asthma Paternal Uncle     Allergies Daughter     Blood Disease No family hx of         Blood Disease,no blood clotting disorders    Ovarian Cancer No family hx of         Cancer-ovarian       Social History:  Marital Status:   [2]  Social History     Tobacco Use    Smoking status: Never    Smokeless tobacco: Never   Substance Use Topics    Alcohol use: Yes     Alcohol/week: 1.0 standard drink of alcohol     Types: 1 Glasses of wine per week    Drug use: Never     Comment: Drug use: No        Medications:    meclizine (ANTIVERT) 25 MG tablet  Calcium Carb-Cholecalciferol (CALCIUM 600 + D PO)  cholecalciferol (VITAMIN D3) 25 mcg (1000 units) capsule  GAVILYTE-G 236 g suspension  GENTLE LAXATIVE 5 MG EC tablet  multivitamin w/minerals (MULTI-VITAMIN) tablet          Review of Systems   Constitutional:  Negative for activity change,  appetite change, fatigue and fever.   HENT:  Positive for ear pain.    Respiratory:  Negative for cough, chest tightness and shortness of breath.    Cardiovascular:  Negative for chest pain.   Gastrointestinal:  Negative for abdominal pain, nausea and vomiting.   Genitourinary: Negative.    Musculoskeletal:  Negative for back pain.   Skin: Negative.    Neurological:  Positive for dizziness. Negative for syncope, facial asymmetry, speech difficulty, weakness, light-headedness and headaches.   Hematological:  Does not bruise/bleed easily.       Physical Exam   BP: 150/82  Pulse: 63  Temp: (!) 96.3  F (35.7  C)  Resp: 20  Height: 152.4 cm (5')  Weight: 72.6 kg (160 lb 1.6 oz)  SpO2: 96 %      Physical Exam  Vitals and nursing note reviewed.   Constitutional:       General: She is not in acute distress.     Appearance: Normal appearance. She is not ill-appearing, toxic-appearing or diaphoretic.   Cardiovascular:      Rate and Rhythm: Normal rate and regular rhythm.   Pulmonary:      Effort: Pulmonary effort is normal.      Breath sounds: Normal breath sounds.   Skin:     General: Skin is warm and dry.      Capillary Refill: Capillary refill takes less than 2 seconds.   Neurological:      General: No focal deficit present.      Mental Status: She is alert and oriented to person, place, and time.      Comments: Cranial nerve examination: revealed that for cranial nerve   II: the pupils were reactive and the visual field were full  III, IV, and VI, the extraocular movements were full with notable horizontal nystagmus bilaterally.  V: facial sensation intact bilateral   VII: facial movements are symmetric  VIII: hearing intact to voice  IX & X: the soft palate rises symmetrically   XI: shoulder movements are symmetric  XII: tongue is midline     Neurological examination:  That the patient was awake and alert, the attention, orientation, concentration, language, memory and fund of knowledge were all normal.  The patient had  no neglect or apraxia.     Normal strength bilaterally.  Normal heel-to-shin  Normal speech   Psychiatric:         Mood and Affect: Mood normal.         ED Course     ED Course as of 05/06/24 1711   Mon May 06, 2024   1357 Broad differential diagnosis is considered on this patient.  Given the patient's age and abrupt onset of patient's we will need to rule out cerebellar infarct or similar presentation.  However she has no overt or concerning lateralizing symptoms.  Plan will be for basic laboratory evaluation, EKG, and CTA of the head and neck.  May need to advance to MRI if symptoms persist.   1405 My independent review of the EKG shows a normal sinus rhythm at a rate of 63.  Normal PA interval.  Normal QRS duration.  Normal QTc.  There is a left axis deviation -47 degrees.  There is no concerning ST segments.   1414 Benadryl provided due to family history of contrast allergy   1442 Patient reports that her dizziness has resolved     Procedures    NIH Stroke Scale (calculator)  Background  Calculate stroke severity based on LOC, orientation, following commands, EOM, Visual fields, facial and extremity motor and sensation, coordination, language and unilateral neglect.  Criteria for Adults   Of possible 42 points   0 points: LOC (1A) - Alert  0 points: Orientation (1B) - Answers 2 questions correctly (both current month AND age)  0 points: Commands (1C) - Performs 2 tasks correctly (both open/close eyes AND make fist)  0 points: EOM (2) - Normal  0 points: Visual Fields (3) - No visual loss  0 points: Motor Face (4) - Normal symmetric movements  0 points: Motor Lt Arm (5A) - No drift  0 points: Motor Rt Arm (5B) - No drift  0 points: Motor LT Leg (6A) - No drift  0 points: Motor LT Leg (6B) - No drift  0 points: Limb Ataxia (7) - Absent  0 points: Sensation (8) - Normal- 0  0 points: Language (9) - Normal without Aphasia  0 points: Speech (10) - Normal articulation without dysarthria  0 points: Extinction (11) - No  abnormality  Interpretation (total score of 3-15 points)  NIH Stroke Score: 0  (1:56 PM)  Score 0: No Stroke Findings             Critical Care time:  none               Results for orders placed or performed during the hospital encounter of 05/06/24 (from the past 24 hour(s))   CBC with platelets differential    Narrative    The following orders were created for panel order CBC with platelets differential.  Procedure                               Abnormality         Status                     ---------                               -----------         ------                     CBC with platelets and d...[[id number]]                      Final result                 Please view results for these tests on the individual orders.   Basic metabolic panel   Result Value Ref Range    Sodium 141 135 - 145 mmol/L    Potassium 3.9 3.4 - 5.3 mmol/L    Chloride 103 98 - 107 mmol/L    Carbon Dioxide (CO2) 27 22 - 29 mmol/L    Anion Gap 11 7 - 15 mmol/L    Urea Nitrogen 16.8 8.0 - 23.0 mg/dL    Creatinine 0.55 0.51 - 0.95 mg/dL    GFR Estimate >90 >60 mL/min/1.73m2    Calcium 9.3 8.8 - 10.2 mg/dL    Glucose 95 70 - 99 mg/dL   Magnesium   Result Value Ref Range    Magnesium 1.9 1.7 - 2.3 mg/dL   Troponin T, High Sensitivity   Result Value Ref Range    Troponin T, High Sensitivity <6 <=14 ng/L   CBC with platelets and differential   Result Value Ref Range    WBC Count 6.4 4.0 - 11.0 10e3/uL    RBC Count 4.15 3.80 - 5.20 10e6/uL    Hemoglobin 12.6 11.7 - 15.7 g/dL    Hematocrit 37.9 35.0 - 47.0 %    MCV 91 78 - 100 fL    MCH 30.4 26.5 - 33.0 pg    MCHC 33.2 31.5 - 36.5 g/dL    RDW 13.0 10.0 - 15.0 %    Platelet Count 211 150 - 450 10e3/uL    % Neutrophils 62 %    % Lymphocytes 31 %    % Monocytes 6 %    % Eosinophils 1 %    % Basophils 0 %    % Immature Granulocytes 0 %    NRBCs per 100 WBC 0 <1 /100    Absolute Neutrophils 4.0 1.6 - 8.3 10e3/uL    Absolute Lymphocytes 2.0 0.8 - 5.3 10e3/uL    Absolute Monocytes 0.4 0.0 - 1.3  10e3/uL    Absolute Eosinophils 0.1 0.0 - 0.7 10e3/uL    Absolute Basophils 0.0 0.0 - 0.2 10e3/uL    Absolute Immature Granulocytes 0.0 <=0.4 10e3/uL    Absolute NRBCs 0.0 10e3/uL   [facility] Draw    Narrative    The following orders were created for panel order [facility] Draw.  Procedure                               Abnormality         Status                     ---------                               -----------         ------                     Extra Blue Top Tube[[id number]]                              Final result               Extra Green Top (Lithium...[[id number]]                      Final result                 Please view results for these tests on the individual orders.   Extra Blue Top Tube   Result Value Ref Range    Hold Specimen JIC    Extra Green Top (Lithium Heparin) Tube   Result Value Ref Range    Hold Specimen JIC    EKG 12-lead, tracing only   Result Value Ref Range    Systolic Blood Pressure  mmHg    Diastolic Blood Pressure  mmHg    Ventricular Rate 63 BPM    Atrial Rate 63 BPM    MD Interval 128 ms    QRS Duration 84 ms     ms    QTc 450 ms    P Axis 55 degrees    R AXIS -47 degrees    T Axis 50 degrees    Interpretation ECG       Sinus rhythm  Left anterior fascicular block  Minimal voltage criteria for LVH, may be normal variant ( Lewellen product )  Abnormal ECG  No previous ECGs available  Confirmed by MD Montalvo Anthony ([id number]) on 5/6/2024 3:15:51 PM     CT Head w/o Contrast    Narrative    PROCEDURE: CT HEAD W/O CONTRAST   5/6/2024 2:37 PM    HISTORY:Female, age,  73 years, , , Abrupt onset of dizziness and  vertiginous symptoms    COMPARISON:No relevant prior imaging.    TECHNIQUE: CT of the brain without contrast. Axial; sagittal and  coronal reconstructed images were reviewed. If present, MIP and/or 3-D  images were constructed by the technologist.    FINDINGS: Ventricles and sulci are age-appropriate. Gray and white  matter demonstrate scattered areas of decreased  density.    There is no evidence of mass, mass effect or midline shift. No  evidence of acute hemorrhage. No acute fracture.       Impression    IMPRESSION:   No acute intracranial hemorrhage.  No acute fracture.     Nonspecific white matter changes suggesting small vessel disease.      This facility minimizes radiation dose by adjusting the mA and/or kV  according to each patient size.      This CT scan was performed using one or more the following dose  reduction techniques:    -Automated exposure control,  -Adjustment of the mA and/or kV according to patient's size, and/or,  -Use of iterative reconstruction technique.      ADIEL MERCEDES MD         SYSTEM ID:  [id number]   CTA Head Neck with Contrast    Narrative    CTA HEAD NECK W CONTRAST    HISTORY: 73 years Female Abrupt onset of dizziness and vertiginous  symptoms    COMPARISON: None    TECHNIQUE: Contrast-enhanced CT angiography of the neck and head was  performed with intervenous contrast. Multiplanar reconstructions and  MIP, volume rendered and 3-D MIP reconstructions were obtained on a  3-D workstation.  This exam was performed using one or more of the following dose  reduction techniques:  Automated exposure control, adjustment of the ASHOK and/or KV according  to patient's size, and/or use of iterative reconstruction technique.    FINDINGS:  There is goiterous enlargement of the thyroid. The left lobe is  enlarged and there is mass effect upon the trachea displacing it to  the right.  Right neck:            Brachiocephalic artery: patent            Common carotid artery:  patent            Internal carotid artery:Widely patent            Vertebral artery:Widely patent                Left neck:            Common carotid artery: Widely patent            Internal carotid artery:Widely patent            Vertebral artery:Widely patent      Head:                      Anterior circulation:The vessels are patent without evidence  of abrupt cut off. There is no  evidence of aneurysm.           Posterior circulation:The vessels are patent. There is no  abrupt cut off. There is no evidence of aneurysm      Impression    IMPRESSION:No acute findings. There is no evidence of significant  carotid artery stenosis. There is no evidence of large vessel  occlusion or intracranial aneurysm.    SONYA CRANDALL MD         SYSTEM ID:  [id number]   MR Brain w/o & w Contrast    Narrative    Exam: MR BRAIN W/O & W CONTRAST    Exam reason: Acute onset of vertiginous symptoms, nystagmus, and  ataxia.  Concern for cerebellar infarction    Technique:   Pre-contrast sagittal T1, axial T1, axial FLAIR, axial GRE, axial  diffusion, and axial T2 weighted images of the brain were obtained.   Post gadolinium axial images of the brain with sagittal and coronal  reformats and axial T1-weighted images of the brain obtained.      Meds/Contrast: Gadavist   7.5 mL    Comparison: 5/6/2024.    Findings:     Parenchyma: No evidence of an acute infarct, prior infarct in a major  vascular territory, mass, abnormal enhancement or intraparenchymal  hemorrhage.       There are scattered foci of T2/FLAIR hyperintense signal in the  periventricular and deep white matter, nonspecific but likely due to  chronic microvascular ischemic change.    No midline shift. The basilar cisterns are patent.    Major intracranial flow voids are preserved.    Extra-axial spaces: No extra-axial hemorrhage or fluid collection.     Ventricles: Normal.     Paranasal Sinuses: Clear.    Mastoid air cells: Unremarkable.  Calvarium: Unremarkable.    Orbits: Normal.        Impression    Impression:  No acute infarct, intracranial hemorrhage, or enhancing abnormality.    PINEDA LOPEZ MD         SYSTEM ID:  [id number]       Medications   sodium chloride 0.9% BOLUS 1,000 mL (0 mLs Intravenous Stopped 5/6/24 1705)   ondansetron (ZOFRAN) injection 4 mg (4 mg Intravenous $Given 5/6/24 1408)   LORazepam (ATIVAN) injection 0.5 mg (0.5 mg  Intravenous $Given 5/6/24 1414)   diphenhydrAMINE (BENADRYL) injection 50 mg (50 mg Intravenous $Given 5/6/24 1416)   iopamidol (ISOVUE-370) solution 67 mL (67 mLs Intravenous $Given 5/6/24 1423)   sodium chloride (PF) 0.9% PF flush 100 mL (100 mLs Intravenous $Given 5/6/24 1423)   sodium chloride (PF) 0.9% PF flush 5 mL (5 mLs Intravenous $Given 5/6/24 1617)   gadobutrol (GADAVIST) injection 7.5 mL (7.5 mLs Intravenous $Given 5/6/24 1617)       Assessments & Plan (with Medical Decision Making)   73-year-old female with abrupt onset of vertiginous symptoms as well as what she describes to be his ataxic gait.  Broad differential was considered.  Extensive workup in the emergency department included cross-sectional imaging of the brain, CT angiogram of the head and neck, and MRI of the brain.  No intracranial catastrophe.  More specifically there is no evidence of CVA or vertebrobasilar insufficiency.  Likely diagnosis is peripheral vertigo.  Treated with the above medications and symptoms resolved.  Reasonable for discharge and close clinic follow-up.  No indication for LP or further evaluation at this time.  Strict return precautions were provided.    Ms. Warren has also agreed to schedule a follow up appointment with her primary provider for re-evaluation and further management. She verbalized an understanding of the results of our workup and agree with the complete discharge plan including instructions for return and follow up.  There is no indication for further investigation or treatment on an emergent basis.  There is no reasonably foreseeable injury that would be associated with discharge and close outpatient follow-up.      This document was prepared using a combination of typing and voice generated software.  While every attempt was made for accuracy, spelling and grammatical errors may exist.     I have reviewed the nursing notes.    I have reviewed the findings, diagnosis, plan and need for follow up  with the patient.           Medical Decision Making  The patient's presentation was of moderate complexity (an undiagnosed new problem with uncertain diagnosis).    The patient's evaluation involved:  ordering and/or review of 3+ test(s) in this encounter (multiple labs and images)    The patient's management necessitated moderate risk (prescription drug management including medications given in the ED).        New Prescriptions    MECLIZINE (ANTIVERT) 25 MG TABLET    Take 1 tablet (25 mg) by mouth 3 times daily as needed for dizziness       Final diagnoses:   Vertigo       5/6/2024   [facility]       Thad Patrick PA-C  05/06/24 [id number]

## 2024-09-24 ENCOUNTER — HOSPITAL ENCOUNTER (EMERGENCY)
Facility: HOSPITAL | Age: 74
Discharge: HOME | End: 2024-09-24
Payer: MEDICARE

## 2024-09-24 VITALS
OXYGEN SATURATION: 97 % | SYSTOLIC BLOOD PRESSURE: 144 MMHG | HEART RATE: 71 BPM | RESPIRATION RATE: 19 BRPM | DIASTOLIC BLOOD PRESSURE: 86 MMHG | TEMPERATURE: 97 F

## 2024-09-24 DIAGNOSIS — L98.9 LESION OF SKIN OF SCALP: ICD-10-CM

## 2024-09-24 PROCEDURE — G0463 HOSPITAL OUTPT CLINIC VISIT: HCPCS

## 2024-09-24 PROCEDURE — 99213 OFFICE O/P EST LOW 20 MIN: CPT

## 2024-09-24 ASSESSMENT — ENCOUNTER SYMPTOMS
APPETITE CHANGE: 0
COLOR CHANGE: 0
FEVER: 0
WOUND: 0
ACTIVITY CHANGE: 0
SORE THROAT: 0

## 2024-09-24 ASSESSMENT — COLUMBIA-SUICIDE SEVERITY RATING SCALE - C-SSRS
1. IN THE PAST MONTH, HAVE YOU WISHED YOU WERE DEAD OR WISHED YOU COULD GO TO SLEEP AND NOT WAKE UP?: NO
2. HAVE YOU ACTUALLY HAD ANY THOUGHTS OF KILLING YOURSELF IN THE PAST MONTH?: NO
6. HAVE YOU EVER DONE ANYTHING, STARTED TO DO ANYTHING, OR PREPARED TO DO ANYTHING TO END YOUR LIFE?: NO

## 2024-09-24 NOTE — ED TRIAGE NOTES
C/o lump     Has a lump on the top of her scalp on the left side   Had a dark ring around it.     was itchy

## 2024-09-24 NOTE — DISCHARGE INSTRUCTIONS
Contact [facility]  [phone]  Follow up in the clinic for a recheck.   Return with any new or concerning symptoms.

## 2024-09-24 NOTE — ED PROVIDER NOTES
"  History     Chief Complaint   Patient presents with    Mass     HPI  Ria Warren is a 73 year old female who presents to the urgent care with complaints of a painless lesion to left upper scalp for the last 2 weeks. States it has increased in size. She denies fevers, chills, n/v/d, and recent illness. Has a history of skin cancer to right side of face. States it was many years ago, does not remember which type of skin cancer.     Allergies:  Allergies   Allergen Reactions    Azithromycin      Other reaction(s): Vomiting    Eggshell Membrane (Chicken) [Egg Shells] Other (See Comments)     Tingling in chest after eating eggs and diarrhea     Penicillins      Other reaction(s): *Unknown  Any \"cillins\" Skin burn from inside out /told to not use         Problem List:    Patient Active Problem List    Diagnosis Date Noted    OME (otitis media with effusion), right 01/23/2020     Priority: Medium    Chronic sinusitis, unspecified location 01/23/2020     Priority: Medium    Special screening for malignant neoplasms, colon 05/16/2017     Priority: Medium    Irritable bowel syndrome 04/27/2017     Priority: Medium    Seasonal allergic rhinitis 04/27/2017     Priority: Medium    Multiple thyroid nodules 04/06/2015     Priority: Medium     Overview:   Seen by Endocrine 4/15, recommend annual exam and ultrasound      KIYA (generalized anxiety disorder) 03/05/2015     Priority: Medium    Atrophic vaginitis 05/03/2012     Priority: Medium        Past Medical History:    Past Medical History:   Diagnosis Date    Asymptomatic menopausal state     Bitten by cat     Encounter for full-term uncomplicated delivery     Encounter for screening for malignant neoplasm of colon     Generalized anxiety disorder     Low back pain     Lower abdominal pain     Nontoxic goiter     Other chest pain     Uterine mass        Past Surgical History:    Past Surgical History:   Procedure Laterality Date    APPENDECTOMY      2022. Surgeon told " her she removed her appendix when she had her hysterectomy    COLONOSCOPY      9/13/2012    COLONOSCOPY  05/17/2017 05/17/2017,follow up 2022 tubular adenoma    COLONOSCOPY N/A 9/29/2023    Procedure: COLONOSCOPY;  Surgeon: Abner Brown MD;  Location: [facility]    HYSTERECTOMY  2022    OTHER SURGICAL HISTORY      81907.0,PAST SURGICAL HISTORY,2004 Hospitalized for cat bite~1990 Lymph node resection x 3 at left axilla for benign disease~Notes prior complications from anesthesia:    TONSILLECTOMY      No Comments Provided       Family History:    Family History   Problem Relation Age of Onset    Breast Cancer Mother         Cancer-breast,50's    Colon Cancer Maternal Grandfather         Cancer-colon,Colon    Diabetes Maternal Grandfather         Diabetes    Heart Failure Maternal Grandmother         Heart failure    Other - See Comments Other         Maternal side heart disease    Thyroid Disease Other         Thyroid Disease,multiple women with goiters    Asthma Father     Asthma Paternal Uncle     Allergies Daughter     Blood Disease No family hx of         Blood Disease,no blood clotting disorders    Ovarian Cancer No family hx of         Cancer-ovarian       Social History:  Marital Status:   [2]  Social History     Tobacco Use    Smoking status: Never    Smokeless tobacco: Never   Substance Use Topics    Alcohol use: Yes     Alcohol/week: 1.0 standard drink of alcohol     Types: 1 Glasses of wine per week    Drug use: Never     Comment: Drug use: No        Medications:    Calcium Carb-Cholecalciferol (CALCIUM 600 + D PO)  cholecalciferol (VITAMIN D3) 25 mcg (1000 units) capsule  GAVILYTE-G 236 g suspension  GENTLE LAXATIVE 5 MG EC tablet  meclizine (ANTIVERT) 25 MG tablet  multivitamin w/minerals (MULTI-VITAMIN) tablet          Review of Systems   Constitutional:  Negative for activity change, appetite change and fever.   HENT:  Negative for congestion, ear pain and sore throat.    Skin:  Negative for  color change, pallor, rash and wound.   All other systems reviewed and are negative.      Physical Exam   BP: 144/86  Pulse: 71  Temp: 97  F (36.1  C)  Resp: 19  SpO2: 97 %      Physical Exam  Vitals and nursing note reviewed.   Constitutional:       General: She is not in acute distress.     Appearance: Normal appearance. She is not ill-appearing or toxic-appearing.   HENT:      Head:     Cardiovascular:      Rate and Rhythm: Normal rate and regular rhythm.      Heart sounds: Normal heart sounds.   Pulmonary:      Effort: Pulmonary effort is normal.      Breath sounds: Normal breath sounds. No wheezing, rhonchi or rales.   Skin:     General: Skin is warm and dry.      Capillary Refill: Capillary refill takes less than 2 seconds.      Coloration: Skin is not pale.      Findings: Lesion present. No abscess or erythema. Rash is not vesicular.   Neurological:      Mental Status: She is alert.         ED Course        Procedures         No results found for this or any previous visit (from the past 24 hour(s)).    Medications - No data to display    Assessments & Plan (with Medical Decision Making)     I have reviewed the nursing notes.    I have reviewed the findings, diagnosis, plan and need for follow up with the patient.  Ria Warren is a 73 year old female who presents to the urgent care with complaints of a painless lesion to left upper scalp for the last 2 weeks. States it has increased in size. She denies fevers, chills, n/v/d, and recent illness. Has a history of skin cancer to right side of face. States it was many years ago, does not remember which type of skin cancer.     MDM: vital signs normal, afebrile. Non toxic in appearance with no noted distress. Lungs clear, heart tones regular. 1cm area of swelling with crusting to left side of scalp. No erythema or drainage. No palpable fluctuance. Non painful on palpation. Does not appear fungal. Differentials include skin cancer, actinic keratosis, and  wart. With her history, high likelihood for skin cancer. Dermatology referral placed. Encouraged close follow up. Supportive measures and return precautions discussed. She in agreement with plan.     (L98.9) Lesion of skin of scalp  Plan: Adult Dermatology  Referral  Contact [facility]  [phone]  Follow up in the clinic for a recheck.   Return with any new or concerning symptoms. Understanding verbalized.       New Prescriptions    No medications on file       Final diagnoses:   Lesion of skin of scalp       9/24/2024   [facility]       Eva Law NP  09/24/24 1600

## 2024-09-25 NOTE — PROGRESS NOTES
Assessment & Plan     (E04.9) Goiter  (primary encounter diagnosis)  Comment: Referral to [facility] was ordered by Dr. Rojas on 4-. Ria hasn't heard from the [facility] for scheduling   Plan: MA called [facility] and was able to get her scheduled in their next soonest appointment     (L98.9) Scalp lesion  Comment: Left side of scalp. Has a slight SK appearance to it. She is scheduled with derm on 9-30-24  Plan: Derm as scheduled for further eval and treatment     The longitudinal plan of care for the diagnosis(es)/condition(s) as documented were addressed during this visit. Due to the added complexity in care, I will continue to support Ria in the subsequent management and with ongoing continuity of care.    BMI  Estimated body mass index is 32.85 kg/m  as calculated from the following:    Height as of this encounter: 1.524 m (5').    Weight as of this encounter: 76.3 kg (168 lb 3.2 oz).   Weight management plan: Discussed healthy diet and exercise guidelines      See Patient Instructions    Return if symptoms worsen or fail to improve.    Subjective   Ria is a 73 year old, presenting for the following health issues:  UC Follow-Up, scalp lesion, and Thyroid Problem        9/27/2024     9:59 AM   Additional Questions   Roomed by Tricia TAYLOR     History of Present Illness       Reason for visit:  Head growth  Symptom onset:  3-4 weeks ago   She is taking medications regularly.       [place]  [facility] [place] of visit: [place]  Reason for visit: scalp lesion  Current Status: still a bump, wants to make sure a tick is not in there    Concern - thyroid goiter  Description:   The goiter has gotten so large that it is hard to sleep, stated throat is always sore and it squeezes at night. Dr. Rojas ordered a referral to [facility] and she hasn't bene called for an appointment     Derm  Description:   Spot on right lower abdomen, it itches, just wants it checked out      Review of  Systems  Constitutional, HEENT, cardiovascular, pulmonary, GI, , musculoskeletal, neuro, skin, endocrine and psych systems are negative, except as otherwise noted.      Objective    /76 (BP Location: Right arm, Patient Position: Sitting, Cuff Size: Adult Regular)   Pulse 68   Temp 97.4  F (36.3  C) (Tympanic)   Ht 1.524 m (5')   Wt 76.3 kg (168 lb 3.2 oz)   LMP  (LMP Unknown)   SpO2 99%   BMI 32.85 kg/m    Body mass index is 32.85 kg/m .  Physical Exam   GENERAL: alert and no distress  NECK: no adenopathy, no asymmetry, masses, or scars  RESP: lungs clear to auscultation - no rales, rhonchi or wheezes  CV: regular rate and rhythm, normal S1 S2, no S3 or S4, no murmur, click or rub, no peripheral edema  MS: no gross musculoskeletal defects noted, no edema  SKIN: no suspicious rashes. +scaly lesion to left frontal scalp without drainage or fluctuance. Bilateral sides of lower abdomen with SK without concern  NEURO: Normal strength and tone, mentation intact and speech normal  PSYCH: mentation appears normal, affect normal/bright        Signed Electronically by: RAVI Garcia CNP

## 2024-09-27 ENCOUNTER — OFFICE VISIT (OUTPATIENT)
Dept: FAMILY MEDICINE | Facility: OTHER | Age: 74
End: 2024-09-27
Attending: NURSE PRACTITIONER
Payer: MEDICARE

## 2024-09-27 VITALS
DIASTOLIC BLOOD PRESSURE: 76 MMHG | HEART RATE: 68 BPM | OXYGEN SATURATION: 99 % | WEIGHT: 168.2 LBS | HEIGHT: 60 IN | SYSTOLIC BLOOD PRESSURE: 136 MMHG | BODY MASS INDEX: 33.02 KG/M2 | TEMPERATURE: 97.4 F

## 2024-09-27 DIAGNOSIS — L98.9 SCALP LESION: ICD-10-CM

## 2024-09-27 DIAGNOSIS — E04.9 GOITER: Primary | ICD-10-CM

## 2024-09-27 PROCEDURE — G2211 COMPLEX E/M VISIT ADD ON: HCPCS | Performed by: NURSE PRACTITIONER

## 2024-09-27 PROCEDURE — G0463 HOSPITAL OUTPT CLINIC VISIT: HCPCS

## 2024-09-27 PROCEDURE — 99214 OFFICE O/P EST MOD 30 MIN: CPT | Performed by: NURSE PRACTITIONER

## 2024-09-27 ASSESSMENT — PAIN SCALES - GENERAL: PAINLEVEL: NO PAIN (0)

## 2024-10-10 NOTE — TELEPHONE ENCOUNTER
FUTURE VISIT INFORMATION      FUTURE VISIT INFORMATION:  Date: 12/9/24  Time: 1pm  [place] [facility] [place]ERRAL INFORMATION:  Referring provider:  Ayesha Rojas MD   Referring providers clinic:  [facility]   Reason for visit/diagnosis  per Julianna/Rosa Staff, Refd Alan Colvin, JOE confd     RECORDS REQUESTED FROM:       Clinic name Comments Records Status Imaging Status   [facility]    9/27/24, 7/5/23 - Ov Emmy Monsivais, APRN CNP    4/11/24- oV Ayesha Freeman MD  [facility]     Imaging  5/6/24- MR Brain + CTA Head + CT Head   4/9/24- Us Thyroid   7/14/23- US Carotid + US Thyroid + CT Neck   3/23/15- Us Thyroid  Epic  Pacs

## 2024-12-08 ENCOUNTER — HOSPITAL ENCOUNTER (EMERGENCY)
Facility: OTHER | Age: 74
Discharge: HOME | End: 2024-12-08
Attending: EMERGENCY MEDICINE
Payer: MEDICARE

## 2024-12-08 ENCOUNTER — APPOINTMENT (OUTPATIENT)
Dept: GENERAL RADIOLOGY | Facility: OTHER | Age: 74
End: 2024-12-08
Attending: EMERGENCY MEDICINE
Payer: MEDICARE

## 2024-12-08 VITALS
DIASTOLIC BLOOD PRESSURE: 85 MMHG | TEMPERATURE: 98.5 F | BODY MASS INDEX: 27.6 KG/M2 | RESPIRATION RATE: 18 BRPM | WEIGHT: 150 LBS | SYSTOLIC BLOOD PRESSURE: 168 MMHG | HEART RATE: 78 BPM | HEIGHT: 62 IN | OXYGEN SATURATION: 96 %

## 2024-12-08 DIAGNOSIS — J11.1 INFLUENZA-LIKE ILLNESS: ICD-10-CM

## 2024-12-08 LAB
FLUAV RNA SPEC QL NAA+PROBE: NEGATIVE
FLUBV RNA RESP QL NAA+PROBE: NEGATIVE
RSV RNA SPEC NAA+PROBE: NEGATIVE
SARS-COV-2 RNA RESP QL NAA+PROBE: NEGATIVE

## 2024-12-08 PROCEDURE — 87637 SARSCOV2&INF A&B&RSV AMP PRB: CPT | Performed by: EMERGENCY MEDICINE

## 2024-12-08 PROCEDURE — 250N000013 HC RX MED GY IP 250 OP 250 PS 637: Performed by: EMERGENCY MEDICINE

## 2024-12-08 PROCEDURE — 71046 X-RAY EXAM CHEST 2 VIEWS: CPT | Mod: TC

## 2024-12-08 PROCEDURE — 99284 EMERGENCY DEPT VISIT MOD MDM: CPT | Mod: 25 | Performed by: EMERGENCY MEDICINE

## 2024-12-08 PROCEDURE — 99283 EMERGENCY DEPT VISIT LOW MDM: CPT | Performed by: EMERGENCY MEDICINE

## 2024-12-08 RX ORDER — ACETAMINOPHEN 500 MG
1000 TABLET ORAL ONCE
Status: COMPLETED | OUTPATIENT
Start: 2024-12-08 | End: 2024-12-08

## 2024-12-08 RX ADMIN — ACETAMINOPHEN 1000 MG: 500 TABLET, FILM COATED ORAL at 13:27

## 2024-12-08 ASSESSMENT — ACTIVITIES OF DAILY LIVING (ADL)
ADLS_ACUITY_SCORE: 41
ADLS_ACUITY_SCORE: 41

## 2024-12-08 NOTE — DISCHARGE INSTRUCTIONS
Drink plenty of fluids.  Use acetaminophen and ibuprofen as needed.  Return for difficulty breathing, repeated vomiting, worsening symptoms, or other concerns.  Otherwise follow-up in clinic.

## 2024-12-08 NOTE — ED PROVIDER NOTES
"  History     Chief Complaint   Patient presents with    Cough     HPI  Ria Warren is a 74 year old female who presents for fever, chills, cough, body aches.  Symptoms ongoing over the past 3 to 4 days.  No vomiting.  She did have loose stools on the first day but none since.  She feels weak and rundown.  She has burning in her chest with coughing but no vomiting otherwise.  No rash.  She has had runny nose and a mild sore throat.    Allergies:  Allergies   Allergen Reactions    Azithromycin      Other reaction(s): Vomiting    Eggshell Membrane (Chicken) [Egg Shells] Other (See Comments)     Tingling in chest after eating eggs and diarrhea     Lactose Diarrhea    Penicillins      Other reaction(s): *Unknown  Any \"cillins\" Skin burn from inside out /told to not use         Problem List:    Patient Active Problem List    Diagnosis Date Noted    OME (otitis media with effusion), right 01/23/2020     Priority: Medium    Chronic sinusitis, unspecified location 01/23/2020     Priority: Medium    Special screening for malignant neoplasms, colon 05/16/2017     Priority: Medium    Irritable bowel syndrome 04/27/2017     Priority: Medium    Seasonal allergic rhinitis 04/27/2017     Priority: Medium    Multiple thyroid nodules 04/06/2015     Priority: Medium     Overview:   Seen by Endocrine 4/15, recommend annual exam and ultrasound      KIYA (generalized anxiety disorder) 03/05/2015     Priority: Medium    Atrophic vaginitis 05/03/2012     Priority: Medium        Past Medical History:    Past Medical History:   Diagnosis Date    Asymptomatic menopausal state     Bitten by cat     Encounter for full-term uncomplicated delivery     Encounter for screening for malignant neoplasm of colon     Generalized anxiety disorder     Low back pain     Lower abdominal pain     Nontoxic goiter     Other chest pain     Uterine mass        Past Surgical History:    Past Surgical History:   Procedure Laterality Date    APPENDECTOMY   "    2022. Surgeon told her she removed her appendix when she had her hysterectomy    COLONOSCOPY      9/13/2012    COLONOSCOPY  05/17/2017 05/17/2017,follow up 2022 tubular adenoma    COLONOSCOPY N/A 9/29/2023    Procedure: COLONOSCOPY;  Surgeon: Abner Brown MD;  Location: [facility]    HYSTERECTOMY  2022    OTHER SURGICAL HISTORY      07516.0,PAST SURGICAL HISTORY,2004 Hospitalized for cat bite~1990 Lymph node resection x 3 at left axilla for benign disease~Notes prior complications from anesthesia:    TONSILLECTOMY      No Comments Provided       Family History:    Family History   Problem Relation Age of Onset    Breast Cancer Mother         Cancer-breast,50's    Colon Cancer Maternal Grandfather         Cancer-colon,Colon    Diabetes Maternal Grandfather         Diabetes    Heart Failure Maternal Grandmother         Heart failure    Other - See Comments Other         Maternal side heart disease    Thyroid Disease Other         Thyroid Disease,multiple women with goiters    Asthma Father     Asthma Paternal Uncle     Allergies Daughter     Blood Disease No family hx of         Blood Disease,no blood clotting disorders    Ovarian Cancer No family hx of         Cancer-ovarian       Social History:  Marital Status:   [2]  Social History     Tobacco Use    Smoking status: Never    Smokeless tobacco: Never   Vaping Use    Vaping status: Never Used   Substance Use Topics    Alcohol use: Yes     Alcohol/week: 1.0 standard drink of alcohol     Types: 1 Glasses of wine per week    Drug use: Never     Comment: Drug use: No        Medications:    Calcium Carb-Cholecalciferol (CALCIUM 600 + D PO)  cholecalciferol (VITAMIN D3) 25 mcg (1000 units) capsule  GAVILYTE-G 236 g suspension  GENTLE LAXATIVE 5 MG EC tablet  meclizine (ANTIVERT) 25 MG tablet  multivitamin w/minerals (MULTI-VITAMIN) tablet          Review of Systems    Physical Exam   BP: (!) 145/86  Pulse: 80  Temp: 98.5  F (36.9  C)  Resp: 18  Height: 157.5  "cm (5' 2\")  Weight: 68 kg (150 lb)  SpO2: 98 %      Physical Exam  Constitutional:       General: She is not in acute distress.     Appearance: She is well-developed. She is not toxic-appearing.   HENT:      Head: Normocephalic and atraumatic.   Cardiovascular:      Rate and Rhythm: Normal rate.      Heart sounds: No murmur heard.  Pulmonary:      Effort: No respiratory distress.      Breath sounds: No stridor. No wheezing or rales.   Skin:     General: Skin is warm and dry.   Neurological:      Mental Status: She is alert.         ED Course        Procedures              Critical Care time:  none              Results for orders placed or performed during the hospital encounter of 12/08/24 (from the past 24 hours)   Influenza A/B, RSV and SARS-CoV2 PCR (COVID-19) Nose    Specimen: Nose; Swab   Result Value Ref Range    Influenza A PCR Negative Negative    Influenza B PCR Negative Negative    RSV PCR Negative Negative    SARS CoV2 PCR Negative Negative    Narrative    Testing was performed using the Xpert Xpress CoV2/Flu/RSV Assay on the [employer] GeneXpert Instrument. This test should be ordered for the detection of SARS-CoV2, influenza, and RSV viruses in individuals with signs and symptoms of respiratory tract infection. This test is for in vitro diagnostic use under the [place] FDA for laboratories certified under CLIA to perform high or moderate complexity testing. This test has been [place] FDA cleared. A negative result does not rule out the presence of PCR inhibitors in the specimen or target RNA in concentration below the limit of detection for the assay. If only one viral target is positive but coinfection with multiple targets is suspected, the sample should be re-tested with another FDA cleared, approved, or authorized test, if coninfection would change clinical management. This test was validated by the [facility]. These laboratories are certified under the Clinical Laboratory Improvement " Amendments of 1988 (CLIA-88) as qualified to perfom high complexity laboratory testing.   XR Chest 2 Views    Narrative    PROCEDURE INFORMATION:   Exam: XR Chest   Exam date and time: 12/8/2024 12:28 PM   Age: 74 years old   Clinical indication: Cough     TECHNIQUE:   Imaging protocol: Radiologic exam of the chest.   Views: 2 views.     COMPARISON:   CR ([id number], CHEST, ) 3/9/2022 3:14 PM     FINDINGS:   Lungs: Lungs are clear. No pulmonary edema or consolidation.   Pleural spaces: No pleural effusion. No pneumothorax.   Heart/Mediastinum: Cardiomediastinal silhouette is normal.   Bones/joints: No acute abnormality.       Impression    IMPRESSION:   No acute findings.     THIS DOCUMENT HAS BEEN ELECTRONICALLY SIGNED BY CASSIE ALANIS MD       Medications   acetaminophen (TYLENOL) tablet 1,000 mg (1,000 mg Oral $Given 12/8/24 6209)       Assessments & Plan (with Medical Decision Making)   74-year-old female presents with fever, chills, body aches, cough, congestion.  Vital signs are reassuring.  She is nontoxic on exam.  Breathing comfortably on room air.  Chest x-ray obtained, images interpreted independently as well as radiology reviewed, no signs of pneumonia or pneumothorax.  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  No indication for further workup of this at this time and I discussed this with the patient, she is supposed be following up with specialist [facility] the [facility].  At this time I believe she is safe to discharge home with instructions to use acetaminophen and ibuprofen as needed, return if worse, otherwise follow-up in clinic.  The patient is in agreement with this plan.    I have reviewed the nursing notes.    I have reviewed the findings, diagnosis, plan and need for follow up with the patient.            Medical Decision Making  The patient's presentation was of moderate complexity (an acute illness with systemic symptoms).    The patient's evaluation involved:  review of 1 test result(s) ordered prior to this encounter (see separate area of note for details)  ordering and/or review of 2 test(s) in this encounter (see separate area of note for details)  independent interpretation of testing performed by another health professional (see separate area of note for details)    The patient's management necessitated only low risk treatment.        Discharge Medication List as of 12/8/2024  1:49 PM          Final diagnoses:   Influenza-like illness       12/8/2024   [facility] Hospital[facility]nav hall MD  12/08/24 4723

## 2024-12-08 NOTE — ED TRIAGE NOTES
Pt presents to triage for concern of cough and congestion, with a sensation of burning in the chest. States to RSV last year and this feels worse.     BP (!) 145/86   Pulse 80   Temp 98.5  F (36.9  C) (Tympanic)   Resp 18   LMP  (LMP Unknown)   SpO2 98%        Triage Assessment (Adult)       Row Name 12/08/24 1130          Triage Assessment    Airway WDL WDL        Respiratory WDL    Respiratory WDL X;cough     Cough Type productive        Skin Circulation/Temperature WDL    Skin Circulation/Temperature WDL WDL        Cardiac WDL    Cardiac WDL WDL        Peripheral/Neurovascular WDL    Peripheral Neurovascular WDL WDL        Cognitive/Neuro/Behavioral WDL    Cognitive/Neuro/Behavioral WDL WDL

## 2024-12-09 ENCOUNTER — PRE VISIT (OUTPATIENT)
Dept: OTOLARYNGOLOGY | Facility: CLINIC | Age: 74
End: 2024-12-09

## 2025-02-24 ENCOUNTER — OFFICE VISIT (OUTPATIENT)
Dept: OTOLARYNGOLOGY | Facility: CLINIC | Age: 75
End: 2025-02-24
Attending: OTOLARYNGOLOGY
Payer: MEDICARE

## 2025-02-24 VITALS
HEART RATE: 84 BPM | DIASTOLIC BLOOD PRESSURE: 84 MMHG | BODY MASS INDEX: 30.57 KG/M2 | HEIGHT: 62 IN | WEIGHT: 166.1 LBS | OXYGEN SATURATION: 95 % | SYSTOLIC BLOOD PRESSURE: 158 MMHG

## 2025-02-24 DIAGNOSIS — E04.9 GOITER: ICD-10-CM

## 2025-02-24 ASSESSMENT — PAIN SCALES - GENERAL: PAINLEVEL_OUTOF10: NO PAIN (0)

## 2025-02-24 NOTE — LETTER
2/24/2025       RE: Ria Warren  [address]  [facility] [address]     Dear Colleague,    Thank you for referring your patient, Ria Warren, to the [facility] [place] at [facility]. Please see a copy of my visit note below.    Left thyroid lobectomy, poss tot at [facility]    SURGERY CLINIC CONSULTATION    REASON FOR CONSULTATION:  Ria Warren was referred by Dr. Haley for evaluation and discussion of treatment options for large thyroid goiter     HISTORY OF PRESENT ILLNESS:  Ria Warren is a 74 year old female who who was hide thyroid goiter most prominent on the left side for at least 40 years.  She has a strong family history of thyroid nodules.  Regarding this thyroid goiter she has had it biopsied in the remote past that has been benign.  She is also had ultrasounds and CT scans that have been following this thyroid goiter and it shown that the thyroid has not really changed much in size for at least 8 years.  Most recent CT scan was from July 2023 and an ultrasound done in 2024.  There are some smaller nodule very small nodule in the right side left side has a 5.5 cm well-circumscribed thyroid nodule with some mild tracheal deviation and no tracheal compression.    Regarding the thyroid nodule the patient denies any problems with voice quality inspiration or swallowing.  She says she notices it more now but again really does not have any true compressive type symptoms.  No symptoms of hypo or hyperthyroidism.  No family history of thyroid carcinoma.  No previous head neck radiation treatment.      REVIEW OF SYSTEMS:  ROS EXAM: 10 point review of systems is pertinent for that noted in the HPI  Patient Active Problem List   Diagnosis     KIYA (generalized anxiety disorder)     Irritable bowel syndrome     Multiple thyroid nodules     Seasonal allergic rhinitis     Special screening for malignant  "neoplasms, colon     Atrophic vaginitis     OME (otitis media with effusion), right     Chronic sinusitis, unspecified location       Past Surgical History:   Procedure Laterality Date     APPENDECTOMY      2022. Surgeon told her she removed her appendix when she had her hysterectomy     COLONOSCOPY      9/13/2012     COLONOSCOPY  05/17/2017 05/17/2017,follow up 2022 tubular adenoma     COLONOSCOPY N/A 9/29/2023    Procedure: COLONOSCOPY;  Surgeon: Abner Brown MD;  Location: [facility]     HYSTERECTOMY  2022     OTHER SURGICAL HISTORY      44166.0,PAST SURGICAL HISTORY,2004 Hospitalized for cat bite~1990 Lymph node resection x 3 at left axilla for benign disease~Notes prior complications from anesthesia:     TONSILLECTOMY      No Comments Provided       Allergies   Allergen Reactions     Azithromycin      Other reaction(s): Vomiting     Eggshell Membrane (Chicken) [Egg Shells] Other (See Comments)     Tingling in chest after eating eggs and diarrhea      Lactose Diarrhea     Penicillins      Other reaction(s): *Unknown  Any \"cillins\" Skin burn from inside out /told to not use         Medications reviewed in the EMR        Family History   Problem Relation Age of Onset     Breast Cancer Mother         Cancer-breast,50's     Colon Cancer Maternal Grandfather         Cancer-colon,Colon     Diabetes Maternal Grandfather         Diabetes     Heart Failure Maternal Grandmother         Heart failure     Other - See Comments Other         Maternal side heart disease     Thyroid Disease Other         Thyroid Disease,multiple women with goiters     Asthma Father      Asthma Paternal Uncle      Allergies Daughter      Blood Disease No family hx of         Blood Disease,no blood clotting disorders     Ovarian Cancer No family hx of         Cancer-ovarian          PHYSICAL EXAM:  BP (!) 158/84 (BP Location: Right arm, Patient Position: Sitting, Cuff Size: Adult Regular)   Pulse 84   Ht 1.575 m (5' 2\")   Wt 75.3 kg (166 lb " 1.6 oz)   LMP  (LMP Unknown)   SpO2 95%   BMI 30.38 kg/m      Neck: The patient's trachea is deviated to her right.  The left lobe of the thyroid gland is measures about 7 cm or 8 cm with a well-circumscribed nodule that is palpable and the inferior borders are barely palpable with swallowing but are palpable with neck extension.  The right lobe of the thyroid gland is not palpable I assume because of the deviation.  No cervical lymphadenopathy.    I personally reviewed the radiographic images and laboratory data    ASSESSMENT:   1. Bilateral thyroid goiter        PLAN:   She has a very small nodule in the right lobe of the thyroid that does not really meet criteria for biopsy.  The left lobe of the thyroid gland is enlarged options would include repeat biopsy of this thyroid nodule but with her past experience she would choose not to do that.  Versus a left thyroid lobectomy possible total thyroidectomy based on intraoperative frozen section of the left thyroid nodule.  If the left thyroid nodule is benign then I would just stop it a thyroid lobectomy if there is any concern for malignancy then we would do a completion thyroidectomy at that same time.  Because of the size of the nodule there is quite high likelihood she will require a drain postoperatively and for that reason we will operate on her at the [facility] with admission overnight.  The risks were also discussed with the patient that include but are not limited to bleeding infection injury to the recurrent laryngeal nerve or nerves potential permanent hypocalcemia or loss of airway.  Patient would like to schedule surgery sometime the end of August    Breana Bruce MD              Again, thank you for allowing me to participate in the care of your patient.      Sincerely,    Breana Bruce MD

## 2025-02-24 NOTE — PROGRESS NOTES
Left thyroid lobectomy, poss tot at [facility]    SURGERY CLINIC CONSULTATION    REASON FOR CONSULTATION:  Ria Warren was referred by Dr. Haley for evaluation and discussion of treatment options for large thyroid goiter     HISTORY OF PRESENT ILLNESS:  Ria Warren is a 74 year old female who who was hide thyroid goiter most prominent on the left side for at least 40 years.  She has a strong family history of thyroid nodules.  Regarding this thyroid goiter she has had it biopsied in the remote past that has been benign.  She is also had ultrasounds and CT scans that have been following this thyroid goiter and it shown that the thyroid has not really changed much in size for at least 8 years.  Most recent CT scan was from July 2023 and an ultrasound done in 2024.  There are some smaller nodule very small nodule in the right side left side has a 5.5 cm well-circumscribed thyroid nodule with some mild tracheal deviation and no tracheal compression.    Regarding the thyroid nodule the patient denies any problems with voice quality inspiration or swallowing.  She says she notices it more now but again really does not have any true compressive type symptoms.  No symptoms of hypo or hyperthyroidism.  No family history of thyroid carcinoma.  No previous head neck radiation treatment.      REVIEW OF SYSTEMS:  ROS EXAM: 10 point review of systems is pertinent for that noted in the HPI  Patient Active Problem List   Diagnosis    KIYA (generalized anxiety disorder)    Irritable bowel syndrome    Multiple thyroid nodules    Seasonal allergic rhinitis    Special screening for malignant neoplasms, colon    Atrophic vaginitis    OME (otitis media with effusion), right    Chronic sinusitis, unspecified location       Past Surgical History:   Procedure Laterality Date    APPENDECTOMY      2022. Surgeon told her she removed her appendix when she had her hysterectomy    COLONOSCOPY      9/13/2012    COLONOSCOPY   "05/17/2017 05/17/2017,follow up 2022 tubular adenoma    COLONOSCOPY N/A 9/29/2023    Procedure: COLONOSCOPY;  Surgeon: Abner Brown MD;  Location: [facility]    HYSTERECTOMY  2022    OTHER SURGICAL HISTORY      69008.0,PAST SURGICAL HISTORY,2004 Hospitalized for cat bite~1990 Lymph node resection x 3 at left axilla for benign disease~Notes prior complications from anesthesia:    TONSILLECTOMY      No Comments Provided       Allergies   Allergen Reactions    Azithromycin      Other reaction(s): Vomiting    Eggshell Membrane (Chicken) [Egg Shells] Other (See Comments)     Tingling in chest after eating eggs and diarrhea     Lactose Diarrhea    Penicillins      Other reaction(s): *Unknown  Any \"cillins\" Skin burn from inside out /told to not use         Medications reviewed in the EMR        Family History   Problem Relation Age of Onset    Breast Cancer Mother         Cancer-breast,50's    Colon Cancer Maternal Grandfather         Cancer-colon,Colon    Diabetes Maternal Grandfather         Diabetes    Heart Failure Maternal Grandmother         Heart failure    Other - See Comments Other         Maternal side heart disease    Thyroid Disease Other         Thyroid Disease,multiple women with goiters    Asthma Father     Asthma Paternal Uncle     Allergies Daughter     Blood Disease No family hx of         Blood Disease,no blood clotting disorders    Ovarian Cancer No family hx of         Cancer-ovarian          PHYSICAL EXAM:  BP (!) 158/84 (BP Location: Right arm, Patient Position: Sitting, Cuff Size: Adult Regular)   Pulse 84   Ht 1.575 m (5' 2\")   Wt 75.3 kg (166 lb 1.6 oz)   LMP  (LMP Unknown)   SpO2 95%   BMI 30.38 kg/m      Neck: The patient's trachea is deviated to her right.  The left lobe of the thyroid gland is measures about 7 cm or 8 cm with a well-circumscribed nodule that is palpable and the inferior borders are barely palpable with swallowing but are palpable with neck extension.  The right lobe " of the thyroid gland is not palpable I assume because of the deviation.  No cervical lymphadenopathy.    I personally reviewed the radiographic images and laboratory data    ASSESSMENT:   1. Bilateral thyroid goiter        PLAN:   She has a very small nodule in the right lobe of the thyroid that does not really meet criteria for biopsy.  The left lobe of the thyroid gland is enlarged options would include repeat biopsy of this thyroid nodule but with her past experience she would choose not to do that.  Versus a left thyroid lobectomy possible total thyroidectomy based on intraoperative frozen section of the left thyroid nodule.  If the left thyroid nodule is benign then I would just stop it a thyroid lobectomy if there is any concern for malignancy then we would do a completion thyroidectomy at that same time.  Because of the size of the nodule there is quite high likelihood she will require a drain postoperatively and for that reason we will operate on her at the [facility] with admission overnight.  The risks were also discussed with the patient that include but are not limited to bleeding infection injury to the recurrent laryngeal nerve or nerves potential permanent hypocalcemia or loss of airway.  Patient would like to schedule surgery sometime the end of August    Breana Bruce MD

## 2025-02-24 NOTE — NURSING NOTE
"Chief Complaint   Patient presents with    Consult   Blood pressure (!) 158/84, pulse 84, height 1.575 m (5' 2\"), weight 75.3 kg (166 lb 1.6 oz), SpO2 95%, not currently breastfeeding. Alex Barry, EMT    "

## 2025-02-26 ENCOUNTER — PREP FOR PROCEDURE (OUTPATIENT)
Dept: OTOLARYNGOLOGY | Facility: CLINIC | Age: 75
End: 2025-02-26
Payer: MEDICARE

## 2025-02-26 DIAGNOSIS — E04.1 THYROID NODULE: Primary | ICD-10-CM

## 2025-02-26 RX ORDER — DEXAMETHASONE SODIUM PHOSPHATE 4 MG/ML
10 INJECTION, SOLUTION INTRA-ARTICULAR; INTRALESIONAL; INTRAMUSCULAR; INTRAVENOUS; SOFT TISSUE ONCE
OUTPATIENT
Start: 2025-02-26 | End: 2025-02-26

## 2025-03-21 ENCOUNTER — TELEPHONE (OUTPATIENT)
Dept: FAMILY MEDICINE | Facility: OTHER | Age: 75
End: 2025-03-21

## 2025-03-21 NOTE — TELEPHONE ENCOUNTER
9:46 AM    Reason for Call: Phone Call    Description: Patient is requesting PCP put in an order for a diagnostic mammogram. States they attempted to schedule a preventative mammogram. Cannot schedule due to having breast pain and tenderness.     Patient states they would prefer to not come in for an appointment if possible. Would like testing ordered so mammogram can be complete. Please reach out to patient to discuss.     Was an appointment offered for this call? No  If yes : Appointment type              Date    Preferred method for responding to this message: Telephone Call  What is your phone number ?[phone]     If we cannot reach you directly, may we leave a detailed response at the number you provided? Yes    Can this message wait until your PCP/provider returns, if available today? Not applicable    Claudia Carrillo

## 2025-03-26 NOTE — PROGRESS NOTES
"  Assessment & Plan       Ria reports using a vaginal body scrub and has noted an increase in vaginal discharge. She's going to stop using the scrub and see if symptoms improve. Denies any bloody discharge. She's had a hysterectomy. She declined any further intervention at this time.     (N63.20) Mass of left breast, unspecified quadrant  (primary encounter diagnosis)  Comment: Inflammation palpated at 3 o'clock on left breast. Diagnostic mammogram with ultrasound   Plan: [facility] Diagnostic Bilateral w/[place], US Breast Left         Limited 1-3 Quadrants            (N64.4) Breast pain  Comment: Diagnostic mammogram with ultrasound   Plan: [facility] Diagnostic Bilateral w/[place], US Breast Left         Limited 1-3 Quadrants            The longitudinal plan of care for the diagnosis(es)/condition(s) as documented were addressed during this visit. Due to the added complexity in care, I will continue to support Ria in the subsequent management and with ongoing continuity of care.      BMI  Estimated body mass index is 30.87 kg/m  as calculated from the following:    Height as of 2/24/25: 1.575 m (5' 2\").    Weight as of this encounter: 76.6 kg (168 lb 12.8 oz).         See Patient Instructions    Return if symptoms worsen or fail to improve.    Subjective   Ria is a 74 year old, presenting for the following health issues:  Breast Problem    HPI      Breast Concern  Onset/Duration: over a year ago  Description:   Location: left breast- gets a shooting sharp pain from left armpit into left breast into the middle of it, will get a pain under her armpit that is deep, has had that feeling before and did have a lymph node removed in one of her armpits back in [id number] non-hodgkins  Pain or tenderness: YES- pain   Redness: No  Intensity: severe  Progression of Symptoms: intermittent, worsening  Accompanying Signs & Symptoms:  Any lumps in axillary region: No  Movable: No  Nipple discharge: none  Changes in the skin or nipple: " no  On Hormone therapy: No  Does it change with menstrual cycle: hysterectomy/postmenopausal- but has had a lot of vaginal discharge lately- took uterus and ovaries. She's been using a different scrub in her vaginal area.   Previous history of similar problem:   First degree relative with breast cancer: a positive family history for breast cancer in her mother., a positive family history of ovarian cancer (aunt)  No LMP recorded (lmp unknown). Patient is postmenopausal.      Review of Systems  Constitutional, HEENT, cardiovascular, pulmonary, GI, , musculoskeletal, neuro, skin, endocrine and psych systems are negative, except as otherwise noted.      Objective    /80 (BP Location: Right arm, Patient Position: Sitting, Cuff Size: Adult Regular)   Pulse 67   Temp 97.8  F (36.6  C) (Tympanic)   Wt 76.6 kg (168 lb 12.8 oz)   LMP  (LMP Unknown)   SpO2 96%   BMI 30.87 kg/m    Body mass index is 30.87 kg/m .  Physical Exam   GENERAL: alert and no distress  NECK: no adenopathy, no asymmetry, masses, or scars  RESP: lungs clear to auscultation - no rales, rhonchi or wheezes  BREAST: no nipple discharge and no palpable axillary masses or adenopathy. TTP to left breast at 3 o'clock with inflammation palpated. No breast dimpling. No erythema, bruising, rash, or warmth to the touch to left breast. Scar at left axillary (previous lymph node removal)   CV: regular rate and rhythm, normal S1 S2, no S3 or S4, no murmur, click or rub, no peripheral edema  MS: no gross musculoskeletal defects noted, no edema  SKIN: no suspicious lesions or rashes  NEURO: Normal strength and tone, mentation intact and speech normal  PSYCH: mentation appears normal, affect normal/bright      Signed Electronically by: RAVI Garcia CNP

## 2025-03-27 ENCOUNTER — OFFICE VISIT (OUTPATIENT)
Dept: FAMILY MEDICINE | Facility: OTHER | Age: 75
End: 2025-03-27
Attending: NURSE PRACTITIONER
Payer: MEDICARE

## 2025-03-27 VITALS
BODY MASS INDEX: 30.87 KG/M2 | TEMPERATURE: 97.8 F | HEART RATE: 67 BPM | WEIGHT: 168.8 LBS | SYSTOLIC BLOOD PRESSURE: 130 MMHG | OXYGEN SATURATION: 96 % | DIASTOLIC BLOOD PRESSURE: 80 MMHG

## 2025-03-27 DIAGNOSIS — N64.4 BREAST PAIN: ICD-10-CM

## 2025-03-27 DIAGNOSIS — N63.20 MASS OF LEFT BREAST, UNSPECIFIED QUADRANT: Primary | ICD-10-CM

## 2025-03-27 PROCEDURE — 99213 OFFICE O/P EST LOW 20 MIN: CPT | Performed by: NURSE PRACTITIONER

## 2025-03-27 PROCEDURE — 3079F DIAST BP 80-89 MM HG: CPT | Performed by: NURSE PRACTITIONER

## 2025-03-27 PROCEDURE — 3075F SYST BP GE 130 - 139MM HG: CPT | Performed by: NURSE PRACTITIONER

## 2025-03-27 PROCEDURE — G2211 COMPLEX E/M VISIT ADD ON: HCPCS | Performed by: NURSE PRACTITIONER

## 2025-03-27 PROCEDURE — G0463 HOSPITAL OUTPT CLINIC VISIT: HCPCS

## 2025-03-27 PROCEDURE — 1126F AMNT PAIN NOTED NONE PRSNT: CPT | Performed by: NURSE PRACTITIONER

## 2025-03-27 ASSESSMENT — PAIN SCALES - GENERAL: PAINLEVEL_OUTOF10: NO PAIN (0)

## 2025-04-03 ENCOUNTER — HOSPITAL ENCOUNTER (OUTPATIENT)
Dept: MAMMOGRAPHY | Facility: OTHER | Age: 75
Discharge: HOME | End: 2025-04-03
Attending: NURSE PRACTITIONER
Payer: MEDICARE

## 2025-04-03 DIAGNOSIS — N64.4 BREAST PAIN: ICD-10-CM

## 2025-04-03 DIAGNOSIS — N63.20 MASS OF LEFT BREAST, UNSPECIFIED QUADRANT: ICD-10-CM

## 2025-04-03 PROCEDURE — 77066 DX MAMMO INCL CAD BI: CPT | Mod: TC

## 2025-04-30 ENCOUNTER — TELEPHONE (OUTPATIENT)
Dept: OTOLARYNGOLOGY | Facility: CLINIC | Age: 75
End: 2025-04-30
Payer: MEDICARE

## 2025-04-30 NOTE — TELEPHONE ENCOUNTER
Called patient and let her know that Dr. Bruce is switching over to surg onc starting in June and she should be ready to schedule out into August in the next week or so. Let her know that the surg onc team will reach out soon    Bibiana Heard  4/30/2025 at 12:36 PM

## 2025-04-30 NOTE — TELEPHONE ENCOUNTER
[facility] Call Center    Phone Message    May a detailed message be left on voicemail: yes     Reason for Call: Other: The pt has been referred for a large goiter and Dr. Richardson wants to do a Left thyroid lobectomy, poss tot at [facility]. The pt has not been contacted and it's been over 2 months. Please call the pt asap to schedule. The pt's cell # is not working well right now and she asked that you call her coworker Ezekiel at [phone]. He is with her and can get you to the pt. Thanks.      Action Taken: Message routed to:  [facility] ([facility] ENT    Travel Screening: Not Applicable     Date of Service:

## 2025-05-13 ENCOUNTER — TELEPHONE (OUTPATIENT)
Dept: SURGERY | Facility: CLINIC | Age: 75
End: 2025-05-13
Payer: MEDICARE

## 2025-05-13 NOTE — TELEPHONE ENCOUNTER
Called patient to schedule surgery with Dr. Bruce    Spoke with:  patient    Date of Surgery: 8/18/2025    Arrival time Discussed with Patient:  No    Location of surgery: [facility]     Pre-Op H&P: PCP, within 30 days of surgery date    Post-Op Appts: 9/10/25, 1:40 PM, [facility], in person     Imaging:  No     Discussed with patient pre-op RN will call 2-3 days prior to surgery with arrival time and instructions:  Yes     Packet sent out: Yes  via Mail - Standard [DATE] 5/15/25      Informed patient that they will need an adult  to bring patient home from surgery: Writer to verify and update patient.       Additional Comments:  Patient inquired about lodging prior to and after surgery. Writer stated they will have RN place a  referral.      All patients questions were answered and patient was instructed to review surgical packet and call back with any questions or concerns.       Jean-Claude Hood on 5/13/2025 at 2:37 PM

## 2025-05-15 NOTE — TELEPHONE ENCOUNTER
Contacted patient. Patient requested physical copy of hotel resources sent to their PO box. Sent resources as requested.

## 2025-07-28 ENCOUNTER — HOSPITAL ENCOUNTER (EMERGENCY)
Facility: HOSPITAL | Age: 75
Discharge: HOME | End: 2025-07-28
Attending: PHYSICIAN ASSISTANT | Admitting: PHYSICIAN ASSISTANT
Payer: MEDICARE

## 2025-07-28 VITALS
RESPIRATION RATE: 18 BRPM | TEMPERATURE: 97.6 F | HEART RATE: 68 BPM | DIASTOLIC BLOOD PRESSURE: 75 MMHG | SYSTOLIC BLOOD PRESSURE: 143 MMHG | OXYGEN SATURATION: 96 %

## 2025-07-28 DIAGNOSIS — J02.9 PHARYNGITIS: Primary | ICD-10-CM

## 2025-07-28 LAB — S PYO DNA THROAT QL NAA+PROBE: NOT DETECTED

## 2025-07-28 PROCEDURE — 99213 OFFICE O/P EST LOW 20 MIN: CPT | Performed by: PHYSICIAN ASSISTANT

## 2025-07-28 PROCEDURE — 87651 STREP A DNA AMP PROBE: CPT | Performed by: PHYSICIAN ASSISTANT

## 2025-07-28 PROCEDURE — G0463 HOSPITAL OUTPT CLINIC VISIT: HCPCS | Performed by: PHYSICIAN ASSISTANT

## 2025-07-28 RX ORDER — CEFDINIR 300 MG/1
300 CAPSULE ORAL 2 TIMES DAILY
Qty: 20 CAPSULE | Refills: 0 | Status: SHIPPED | OUTPATIENT
Start: 2025-07-28 | End: 2025-08-07

## 2025-07-28 ASSESSMENT — ENCOUNTER SYMPTOMS
FATIGUE: 1
SORE THROAT: 1

## 2025-07-28 ASSESSMENT — COLUMBIA-SUICIDE SEVERITY RATING SCALE - C-SSRS
1. IN THE PAST MONTH, HAVE YOU WISHED YOU WERE DEAD OR WISHED YOU COULD GO TO SLEEP AND NOT WAKE UP?: NO
6. HAVE YOU EVER DONE ANYTHING, STARTED TO DO ANYTHING, OR PREPARED TO DO ANYTHING TO END YOUR LIFE?: NO
2. HAVE YOU ACTUALLY HAD ANY THOUGHTS OF KILLING YOURSELF IN THE PAST MONTH?: NO

## 2025-07-28 NOTE — ED PROVIDER NOTES
"  History     Chief Complaint   Patient presents with    Pharyngitis     HPI  Ria Warren is a 74 year old female who presents to urgent care for evaluation of sore throat and left-sided otalgia, fatigue.  Patient states that over the past week she has had left-sided otalgia and sore throat.  She states she would like to make sure she does not have strep throat.  She denies any fevers, chest pain, palpitations, nausea, vomiting, diarrhea, or any other associated symptoms.  Patient does have a goiter and states she has had this since she was a child but is having surgery dealing with this coming up August 18 done at the [facility].    Allergies:  Allergies   Allergen Reactions    Azithromycin      Other reaction(s): Vomiting    Eggshell Membrane (Chicken) [Egg Shells] Other (See Comments)     Tingling in chest after eating eggs and diarrhea     Lactose Diarrhea    Penicillins      Other reaction(s): *Unknown  Any \"cillins\" Skin burn from inside out /told to not use         Problem List:    Patient Active Problem List    Diagnosis Date Noted    OME (otitis media with effusion), right 01/23/2020     Priority: Medium    Chronic sinusitis, unspecified location 01/23/2020     Priority: Medium    Special screening for malignant neoplasms, colon 05/16/2017     Priority: Medium    Irritable bowel syndrome 04/27/2017     Priority: Medium    Seasonal allergic rhinitis 04/27/2017     Priority: Medium    Multiple thyroid nodules 04/06/2015     Priority: Medium     Overview:   Seen by Endocrine 4/15, recommend annual exam and ultrasound      KIYA (generalized anxiety disorder) 03/05/2015     Priority: Medium    Atrophic vaginitis 05/03/2012     Priority: Medium        Past Medical History:    Past Medical History:   Diagnosis Date    Asymptomatic menopausal state     Bitten by cat     Encounter for full-term uncomplicated delivery     Encounter for screening for malignant neoplasm of colon     Generalized anxiety disorder     " Low back pain     Lower abdominal pain     Nontoxic goiter     Other chest pain     Uterine mass        Past Surgical History:    Past Surgical History:   Procedure Laterality Date    APPENDECTOMY      2022. Surgeon told her she removed her appendix when she had her hysterectomy    COLONOSCOPY      9/13/2012    COLONOSCOPY  05/17/2017 05/17/2017,follow up 2022 tubular adenoma    COLONOSCOPY N/A 9/29/2023    Procedure: COLONOSCOPY;  Surgeon: Abner Brown MD;  Location: [facility]    HYSTERECTOMY  2022    OTHER SURGICAL HISTORY      78886.0,PAST SURGICAL HISTORY,2004 Hospitalized for cat bite~1990 Lymph node resection x 3 at left axilla for benign disease~Notes prior complications from anesthesia:    TONSILLECTOMY      No Comments Provided       Family History:    Family History   Problem Relation Age of Onset    Breast Cancer Mother 70 - 79        1 breast    Asthma Father     Allergies Daughter     Heart Failure Maternal Grandmother         Heart failure    Colon Cancer Maternal Grandfather         Cancer-colon,Colon    Diabetes Maternal Grandfather         Diabetes    Asthma Paternal Uncle     Other - See Comments Other         Maternal side heart disease    Thyroid Disease Other         Thyroid Disease,multiple women with goiters    Blood Disease No family hx of         Blood Disease,no blood clotting disorders    Ovarian Cancer No family hx of         Cancer-ovarian       Social History:  Marital Status:   [2]  Social History     Tobacco Use    Smoking status: Never    Smokeless tobacco: Never   Vaping Use    Vaping status: Never Used   Substance Use Topics    Alcohol use: Yes     Alcohol/week: 1.0 standard drink of alcohol     Types: 1 Glasses of wine per week    Drug use: Never     Comment: Drug use: No        Medications:    cefdinir (OMNICEF) 300 MG capsule          Review of Systems   Constitutional:  Positive for fatigue.   HENT:  Positive for ear pain and sore throat.    All other systems reviewed  and are negative.      Physical Exam   BP: (!) 143/75  Pulse: 68  Temp: 97.6  F (36.4  C)  Resp: 18  SpO2: 96 %      Physical Exam  Vitals and nursing note reviewed.   Constitutional:       General: She is not in acute distress.     Appearance: Normal appearance. She is not ill-appearing or toxic-appearing.   HENT:      Right Ear: Tympanic membrane normal.      Left Ear: Tympanic membrane normal.      Nose: Nose normal.      Mouth/Throat:      Mouth: Mucous membranes are moist.      Pharynx: Oropharynx is clear.   Eyes:      Conjunctiva/sclera: Conjunctivae normal.      Pupils: Pupils are equal, round, and reactive to light.   Cardiovascular:      Rate and Rhythm: Regular rhythm.      Heart sounds: Normal heart sounds.   Pulmonary:      Breath sounds: Normal breath sounds.   Neurological:      Mental Status: She is alert and oriented to person, place, and time.         ED Course        Procedures             Critical Care time:               No results found for this or any previous visit (from the past 24 hours).    Medications - No data to display    Assessments & Plan (with Medical Decision Making)   #1.  Pharyngitis      Discussed exam findings with patient.  Patient strep test pending will be notified of abnormal results.  Through shared decision making patient is prescribed course of cefdinir due to penicillin and macrolide allergy.  Supportive cares discussed.  Return precautions discussed.  Any additional concerns patient can return to urgent care/emergency department or follow-up with primary care provider.  Patient verbalized understanding and agreement to plan.      I have reviewed the nursing notes.    I have reviewed the findings, diagnosis, plan and need for follow up with the patient.              New Prescriptions    CEFDINIR (OMNICEF) 300 MG CAPSULE    Take 1 capsule (300 mg) by mouth 2 times daily for 10 days.       Final diagnoses:   Pharyngitis       7/28/2025   [facility]        Bartolo Leslie PA-C  07/28/25 1054

## 2025-07-28 NOTE — ED TRIAGE NOTES
Pt is here with c/o sore throat x2 weeks, left ear ache. Chest tightness , has hx of pneumonia, notes tight cough that is productive and yellow. Pt notes overall not feeling well.

## 2025-08-06 ENCOUNTER — OFFICE VISIT (OUTPATIENT)
Dept: FAMILY MEDICINE | Facility: OTHER | Age: 75
End: 2025-08-06
Attending: NURSE PRACTITIONER
Payer: MEDICARE

## 2025-08-06 VITALS
WEIGHT: 164 LBS | HEIGHT: 62 IN | DIASTOLIC BLOOD PRESSURE: 72 MMHG | SYSTOLIC BLOOD PRESSURE: 126 MMHG | TEMPERATURE: 97.5 F | BODY MASS INDEX: 30.18 KG/M2 | OXYGEN SATURATION: 97 % | HEART RATE: 70 BPM

## 2025-08-06 DIAGNOSIS — Z01.818 PREOP GENERAL PHYSICAL EXAM: Primary | ICD-10-CM

## 2025-08-06 DIAGNOSIS — E04.9 THYROID GOITER: ICD-10-CM

## 2025-08-06 LAB
ALBUMIN UR-MCNC: NEGATIVE MG/DL
ANION GAP SERPL CALCULATED.3IONS-SCNC: 9 MMOL/L (ref 7–15)
APPEARANCE UR: CLEAR
ATRIAL RATE - MUSE: 65 BPM
BACTERIA #/AREA URNS HPF: ABNORMAL /HPF
BASOPHILS # BLD AUTO: 0 10E3/UL (ref 0–0.2)
BASOPHILS NFR BLD AUTO: 0 %
BILIRUB UR QL STRIP: NEGATIVE
BUN SERPL-MCNC: 19.7 MG/DL (ref 8–23)
CALCIUM SERPL-MCNC: 9 MG/DL (ref 8.8–10.4)
CHLORIDE SERPL-SCNC: 104 MMOL/L (ref 98–107)
COLOR UR AUTO: YELLOW
CREAT SERPL-MCNC: 0.64 MG/DL (ref 0.51–0.95)
DIASTOLIC BLOOD PRESSURE - MUSE: NORMAL MMHG
EGFRCR SERPLBLD CKD-EPI 2021: >90 ML/MIN/1.73M2
EOSINOPHIL # BLD AUTO: 0.1 10E3/UL (ref 0–0.7)
EOSINOPHIL NFR BLD AUTO: 1 %
ERYTHROCYTE [DISTWIDTH] IN BLOOD BY AUTOMATED COUNT: 13.2 % (ref 10–15)
GLUCOSE SERPL-MCNC: 92 MG/DL (ref 70–99)
GLUCOSE UR STRIP-MCNC: NEGATIVE MG/DL
HCO3 SERPL-SCNC: 26 MMOL/L (ref 22–29)
HCT VFR BLD AUTO: 37.9 % (ref 35–47)
HGB BLD-MCNC: 12.6 G/DL (ref 11.7–15.7)
HGB UR QL STRIP: ABNORMAL
HOLD SPECIMEN: NORMAL
IMM GRANULOCYTES # BLD: 0 10E3/UL
IMM GRANULOCYTES NFR BLD: 0 %
INTERPRETATION ECG - MUSE: NORMAL
KETONES UR STRIP-MCNC: NEGATIVE MG/DL
LEUKOCYTE ESTERASE UR QL STRIP: ABNORMAL
LYMPHOCYTES # BLD AUTO: 1.6 10E3/UL (ref 0.8–5.3)
LYMPHOCYTES NFR BLD AUTO: 25 %
MCH RBC QN AUTO: 30.7 PG (ref 26.5–33)
MCHC RBC AUTO-ENTMCNC: 33.2 G/DL (ref 31.5–36.5)
MCV RBC AUTO: 92 FL (ref 78–100)
MONOCYTES # BLD AUTO: 0.5 10E3/UL (ref 0–1.3)
MONOCYTES NFR BLD AUTO: 8 %
NEUTROPHILS # BLD AUTO: 4.3 10E3/UL (ref 1.6–8.3)
NEUTROPHILS NFR BLD AUTO: 67 %
NITRATE UR QL: NEGATIVE
P AXIS - MUSE: 23 DEGREES
PH UR STRIP: 6.5 [PH] (ref 5–7)
PLATELET # BLD AUTO: 233 10E3/UL (ref 150–450)
POTASSIUM SERPL-SCNC: 4.3 MMOL/L (ref 3.4–5.3)
PR INTERVAL - MUSE: 142 MS
QRS DURATION - MUSE: 92 MS
QT - MUSE: 406 MS
QTC - MUSE: 422 MS
R AXIS - MUSE: -42 DEGREES
RBC # BLD AUTO: 4.11 10E6/UL (ref 3.8–5.2)
RBC #/AREA URNS AUTO: ABNORMAL /HPF
SODIUM SERPL-SCNC: 139 MMOL/L (ref 135–145)
SP GR UR STRIP: 1.01 (ref 1–1.03)
SQUAMOUS #/AREA URNS AUTO: ABNORMAL /LPF
SYSTOLIC BLOOD PRESSURE - MUSE: NORMAL MMHG
T AXIS - MUSE: 48 DEGREES
UROBILINOGEN UR STRIP-ACNC: 1 E.U./DL
VENTRICULAR RATE- MUSE: 65 BPM
WBC # BLD AUTO: 6.4 10E3/UL (ref 4–11)
WBC #/AREA URNS AUTO: ABNORMAL /HPF

## 2025-08-06 PROCEDURE — G0463 HOSPITAL OUTPT CLINIC VISIT: HCPCS

## 2025-08-06 PROCEDURE — 81001 URINALYSIS AUTO W/SCOPE: CPT | Mod: ZL | Performed by: NURSE PRACTITIONER

## 2025-08-06 PROCEDURE — 80048 BASIC METABOLIC PNL TOTAL CA: CPT | Mod: ZL | Performed by: NURSE PRACTITIONER

## 2025-08-06 PROCEDURE — 36415 COLL VENOUS BLD VENIPUNCTURE: CPT | Mod: ZL | Performed by: NURSE PRACTITIONER

## 2025-08-06 PROCEDURE — 85025 COMPLETE CBC W/AUTO DIFF WBC: CPT | Mod: ZL | Performed by: NURSE PRACTITIONER

## 2025-08-06 PROCEDURE — 81003 URINALYSIS AUTO W/O SCOPE: CPT | Mod: ZL | Performed by: NURSE PRACTITIONER

## 2025-08-06 ASSESSMENT — PAIN SCALES - GENERAL: PAINLEVEL_OUTOF10: NO PAIN (0)

## 2025-08-12 ENCOUNTER — ANCILLARY PROCEDURE (OUTPATIENT)
Dept: GENERAL RADIOLOGY | Facility: OTHER | Age: 75
End: 2025-08-12
Attending: NURSE PRACTITIONER
Payer: MEDICARE

## 2025-08-12 DIAGNOSIS — Z01.818 PREOP GENERAL PHYSICAL EXAM: ICD-10-CM

## 2025-08-12 DIAGNOSIS — E04.9 THYROID GOITER: ICD-10-CM

## 2025-08-12 PROCEDURE — 71046 X-RAY EXAM CHEST 2 VIEWS: CPT | Mod: 26 | Performed by: RADIOLOGY

## 2025-08-12 PROCEDURE — 71046 X-RAY EXAM CHEST 2 VIEWS: CPT | Mod: TC,FY

## 2025-08-18 ENCOUNTER — ANESTHESIA (OUTPATIENT)
Dept: SURGERY | Facility: CLINIC | Age: 75
End: 2025-08-18
Payer: MEDICARE

## 2025-08-18 ENCOUNTER — ANESTHESIA EVENT (OUTPATIENT)
Dept: SURGERY | Facility: CLINIC | Age: 75
End: 2025-08-18
Payer: MEDICARE

## 2025-08-18 ENCOUNTER — HOSPITAL ENCOUNTER (OUTPATIENT)
Facility: CLINIC | Age: 75
Discharge: HOME | End: 2025-08-19
Attending: SURGERY | Admitting: SURGERY
Payer: MEDICARE

## 2025-08-18 DIAGNOSIS — E04.1 THYROID NODULE: Primary | ICD-10-CM

## 2025-08-18 LAB — GLUCOSE BLDC GLUCOMTR-MCNC: 80 MG/DL (ref 70–99)

## 2025-08-18 PROCEDURE — 250N000011 HC RX IP 250 OP 636: Performed by: NURSE ANESTHETIST, CERTIFIED REGISTERED

## 2025-08-18 PROCEDURE — 360N000077 HC SURGERY LEVEL 4, PER MIN: Performed by: SURGERY

## 2025-08-18 PROCEDURE — 370N000017 HC ANESTHESIA TECHNICAL FEE, PER MIN: Performed by: SURGERY

## 2025-08-18 PROCEDURE — 999N000141 HC STATISTIC PRE-PROCEDURE NURSING ASSESSMENT: Performed by: SURGERY

## 2025-08-18 PROCEDURE — 258N000003 HC RX IP 258 OP 636: Performed by: NURSE ANESTHETIST, CERTIFIED REGISTERED

## 2025-08-18 PROCEDURE — 88307 TISSUE EXAM BY PATHOLOGIST: CPT | Mod: 26 | Performed by: STUDENT IN AN ORGANIZED HEALTH CARE EDUCATION/TRAINING PROGRAM

## 2025-08-18 PROCEDURE — 250N000011 HC RX IP 250 OP 636: Performed by: ANESTHESIOLOGY

## 2025-08-18 PROCEDURE — 60220 PARTIAL REMOVAL OF THYROID: CPT | Mod: LT | Performed by: SURGERY

## 2025-08-18 PROCEDURE — 88307 TISSUE EXAM BY PATHOLOGIST: CPT | Mod: TC | Performed by: SURGERY

## 2025-08-18 PROCEDURE — 250N000009 HC RX 250: Performed by: NURSE ANESTHETIST, CERTIFIED REGISTERED

## 2025-08-18 PROCEDURE — 710N000010 HC RECOVERY PHASE 1, LEVEL 2, PER MIN: Performed by: SURGERY

## 2025-08-18 PROCEDURE — 93005 ELECTROCARDIOGRAM TRACING: CPT

## 2025-08-18 PROCEDURE — 272N000001 HC OR GENERAL SUPPLY STERILE: Performed by: SURGERY

## 2025-08-18 PROCEDURE — 250N000025 HC SEVOFLURANE, PER MIN: Performed by: SURGERY

## 2025-08-18 PROCEDURE — 250N000013 HC RX MED GY IP 250 OP 250 PS 637

## 2025-08-18 PROCEDURE — 82962 GLUCOSE BLOOD TEST: CPT

## 2025-08-18 RX ORDER — AMOXICILLIN 250 MG
1 CAPSULE ORAL 2 TIMES DAILY
Status: DISCONTINUED | OUTPATIENT
Start: 2025-08-18 | End: 2025-08-19 | Stop reason: HOSPADM

## 2025-08-18 RX ORDER — POLYETHYLENE GLYCOL 3350 17 G/17G
17 POWDER, FOR SOLUTION ORAL DAILY
Status: DISCONTINUED | OUTPATIENT
Start: 2025-08-19 | End: 2025-08-19 | Stop reason: HOSPADM

## 2025-08-18 RX ORDER — DEXAMETHASONE SODIUM PHOSPHATE 4 MG/ML
INJECTION, SOLUTION INTRA-ARTICULAR; INTRALESIONAL; INTRAMUSCULAR; INTRAVENOUS; SOFT TISSUE PRN
Status: DISCONTINUED | OUTPATIENT
Start: 2025-08-18 | End: 2025-08-18

## 2025-08-18 RX ORDER — ONDANSETRON 2 MG/ML
4 INJECTION INTRAMUSCULAR; INTRAVENOUS EVERY 30 MIN PRN
Status: DISCONTINUED | OUTPATIENT
Start: 2025-08-18 | End: 2025-08-18 | Stop reason: HOSPADM

## 2025-08-18 RX ORDER — NALOXONE HYDROCHLORIDE 0.4 MG/ML
0.1 INJECTION, SOLUTION INTRAMUSCULAR; INTRAVENOUS; SUBCUTANEOUS
Status: DISCONTINUED | OUTPATIENT
Start: 2025-08-18 | End: 2025-08-18 | Stop reason: HOSPADM

## 2025-08-18 RX ORDER — PROPOFOL 10 MG/ML
INJECTION, EMULSION INTRAVENOUS PRN
Status: DISCONTINUED | OUTPATIENT
Start: 2025-08-18 | End: 2025-08-18

## 2025-08-18 RX ORDER — NALOXONE HYDROCHLORIDE 0.4 MG/ML
0.2 INJECTION, SOLUTION INTRAMUSCULAR; INTRAVENOUS; SUBCUTANEOUS
Status: DISCONTINUED | OUTPATIENT
Start: 2025-08-18 | End: 2025-08-19 | Stop reason: HOSPADM

## 2025-08-18 RX ORDER — ACETAMINOPHEN 325 MG/1
975 TABLET ORAL EVERY 8 HOURS
Status: DISCONTINUED | OUTPATIENT
Start: 2025-08-18 | End: 2025-08-19 | Stop reason: HOSPADM

## 2025-08-18 RX ORDER — DEXAMETHASONE SODIUM PHOSPHATE 4 MG/ML
4 INJECTION, SOLUTION INTRA-ARTICULAR; INTRALESIONAL; INTRAMUSCULAR; INTRAVENOUS; SOFT TISSUE
Status: DISCONTINUED | OUTPATIENT
Start: 2025-08-18 | End: 2025-08-18 | Stop reason: HOSPADM

## 2025-08-18 RX ORDER — NALOXONE HYDROCHLORIDE 0.4 MG/ML
0.4 INJECTION, SOLUTION INTRAMUSCULAR; INTRAVENOUS; SUBCUTANEOUS
Status: DISCONTINUED | OUTPATIENT
Start: 2025-08-18 | End: 2025-08-19 | Stop reason: HOSPADM

## 2025-08-18 RX ORDER — METOPROLOL TARTRATE 1 MG/ML
1-2 INJECTION, SOLUTION INTRAVENOUS EVERY 5 MIN PRN
Status: DISCONTINUED | OUTPATIENT
Start: 2025-08-18 | End: 2025-08-18 | Stop reason: HOSPADM

## 2025-08-18 RX ORDER — HYDROMORPHONE HYDROCHLORIDE 1 MG/ML
0.2 INJECTION, SOLUTION INTRAMUSCULAR; INTRAVENOUS; SUBCUTANEOUS EVERY 5 MIN PRN
Status: DISCONTINUED | OUTPATIENT
Start: 2025-08-18 | End: 2025-08-18 | Stop reason: HOSPADM

## 2025-08-18 RX ORDER — ONDANSETRON 4 MG/1
4 TABLET, ORALLY DISINTEGRATING ORAL EVERY 6 HOURS PRN
Status: DISCONTINUED | OUTPATIENT
Start: 2025-08-18 | End: 2025-08-19 | Stop reason: HOSPADM

## 2025-08-18 RX ORDER — ONDANSETRON 2 MG/ML
4 INJECTION INTRAMUSCULAR; INTRAVENOUS EVERY 6 HOURS PRN
Status: DISCONTINUED | OUTPATIENT
Start: 2025-08-18 | End: 2025-08-19 | Stop reason: HOSPADM

## 2025-08-18 RX ORDER — SODIUM CHLORIDE, SODIUM LACTATE, POTASSIUM CHLORIDE, CALCIUM CHLORIDE 600; 310; 30; 20 MG/100ML; MG/100ML; MG/100ML; MG/100ML
INJECTION, SOLUTION INTRAVENOUS CONTINUOUS
Status: DISCONTINUED | OUTPATIENT
Start: 2025-08-18 | End: 2025-08-18 | Stop reason: HOSPADM

## 2025-08-18 RX ORDER — ONDANSETRON 2 MG/ML
INJECTION INTRAMUSCULAR; INTRAVENOUS PRN
Status: DISCONTINUED | OUTPATIENT
Start: 2025-08-18 | End: 2025-08-18

## 2025-08-18 RX ORDER — DEXAMETHASONE SODIUM PHOSPHATE 10 MG/ML
10 INJECTION, SOLUTION INTRAMUSCULAR; INTRAVENOUS ONCE
Status: DISCONTINUED | OUTPATIENT
Start: 2025-08-18 | End: 2025-08-18 | Stop reason: HOSPADM

## 2025-08-18 RX ORDER — SODIUM CHLORIDE, SODIUM LACTATE, POTASSIUM CHLORIDE, CALCIUM CHLORIDE 600; 310; 30; 20 MG/100ML; MG/100ML; MG/100ML; MG/100ML
INJECTION, SOLUTION INTRAVENOUS CONTINUOUS PRN
Status: DISCONTINUED | OUTPATIENT
Start: 2025-08-18 | End: 2025-08-18

## 2025-08-18 RX ORDER — PROCHLORPERAZINE MALEATE 5 MG/1
5 TABLET ORAL EVERY 6 HOURS PRN
Status: DISCONTINUED | OUTPATIENT
Start: 2025-08-18 | End: 2025-08-19 | Stop reason: HOSPADM

## 2025-08-18 RX ORDER — HYDROMORPHONE HYDROCHLORIDE 1 MG/ML
0.4 INJECTION, SOLUTION INTRAMUSCULAR; INTRAVENOUS; SUBCUTANEOUS EVERY 5 MIN PRN
Status: DISCONTINUED | OUTPATIENT
Start: 2025-08-18 | End: 2025-08-18 | Stop reason: HOSPADM

## 2025-08-18 RX ORDER — BISACODYL 10 MG
10 SUPPOSITORY, RECTAL RECTAL DAILY PRN
Status: DISCONTINUED | OUTPATIENT
Start: 2025-08-21 | End: 2025-08-19 | Stop reason: HOSPADM

## 2025-08-18 RX ORDER — HYDROMORPHONE HCL IN WATER/PF 6 MG/30 ML
0.2 PATIENT CONTROLLED ANALGESIA SYRINGE INTRAVENOUS EVERY 4 HOURS PRN
Status: DISCONTINUED | OUTPATIENT
Start: 2025-08-18 | End: 2025-08-19 | Stop reason: HOSPADM

## 2025-08-18 RX ORDER — ONDANSETRON 4 MG/1
4 TABLET, ORALLY DISINTEGRATING ORAL EVERY 30 MIN PRN
Status: DISCONTINUED | OUTPATIENT
Start: 2025-08-18 | End: 2025-08-18 | Stop reason: HOSPADM

## 2025-08-18 RX ORDER — FENTANYL CITRATE 50 UG/ML
25 INJECTION, SOLUTION INTRAMUSCULAR; INTRAVENOUS EVERY 5 MIN PRN
Status: DISCONTINUED | OUTPATIENT
Start: 2025-08-18 | End: 2025-08-18 | Stop reason: HOSPADM

## 2025-08-18 RX ORDER — LIDOCAINE 40 MG/G
CREAM TOPICAL
Status: DISCONTINUED | OUTPATIENT
Start: 2025-08-18 | End: 2025-08-19 | Stop reason: HOSPADM

## 2025-08-18 RX ORDER — FENTANYL CITRATE 50 UG/ML
50 INJECTION, SOLUTION INTRAMUSCULAR; INTRAVENOUS EVERY 5 MIN PRN
Status: DISCONTINUED | OUTPATIENT
Start: 2025-08-18 | End: 2025-08-18 | Stop reason: HOSPADM

## 2025-08-18 RX ORDER — LIDOCAINE HYDROCHLORIDE 20 MG/ML
INJECTION, SOLUTION INFILTRATION; PERINEURAL PRN
Status: DISCONTINUED | OUTPATIENT
Start: 2025-08-18 | End: 2025-08-18

## 2025-08-18 RX ORDER — FENTANYL CITRATE 50 UG/ML
INJECTION, SOLUTION INTRAMUSCULAR; INTRAVENOUS PRN
Status: DISCONTINUED | OUTPATIENT
Start: 2025-08-18 | End: 2025-08-18

## 2025-08-18 RX ADMIN — SUCCINYLCHOLINE CHLORIDE 140 MG: 20 INJECTION, SOLUTION INTRAMUSCULAR; INTRAVENOUS; PARENTERAL at 11:52

## 2025-08-18 RX ADMIN — FENTANYL CITRATE 50 MCG: 50 INJECTION, SOLUTION INTRAMUSCULAR; INTRAVENOUS at 15:19

## 2025-08-18 RX ADMIN — PROPOFOL 20 MG: 10 INJECTION, EMULSION INTRAVENOUS at 12:21

## 2025-08-18 RX ADMIN — SODIUM CHLORIDE, SODIUM LACTATE, POTASSIUM CHLORIDE, AND CALCIUM CHLORIDE: .6; .31; .03; .02 INJECTION, SOLUTION INTRAVENOUS at 11:52

## 2025-08-18 RX ADMIN — PROPOFOL 200 MG: 10 INJECTION, EMULSION INTRAVENOUS at 11:52

## 2025-08-18 RX ADMIN — HYDROMORPHONE HYDROCHLORIDE 0.5 MG: 1 INJECTION, SOLUTION INTRAMUSCULAR; INTRAVENOUS; SUBCUTANEOUS at 12:20

## 2025-08-18 RX ADMIN — FENTANYL CITRATE 100 MCG: 50 INJECTION INTRAMUSCULAR; INTRAVENOUS at 11:52

## 2025-08-18 RX ADMIN — ACETAMINOPHEN 975 MG: 325 TABLET ORAL at 18:46

## 2025-08-18 RX ADMIN — DEXAMETHASONE SODIUM PHOSPHATE 10 MG: 4 INJECTION, SOLUTION INTRAMUSCULAR; INTRAVENOUS at 12:08

## 2025-08-18 RX ADMIN — PHENYLEPHRINE HYDROCHLORIDE 100 MCG: 10 INJECTION INTRAVENOUS at 12:07

## 2025-08-18 RX ADMIN — PHENYLEPHRINE HYDROCHLORIDE 100 MCG: 10 INJECTION INTRAVENOUS at 12:13

## 2025-08-18 RX ADMIN — LIDOCAINE HYDROCHLORIDE 100 MG: 20 INJECTION, SOLUTION INFILTRATION; PERINEURAL at 11:52

## 2025-08-18 RX ADMIN — SODIUM CHLORIDE, SODIUM LACTATE, POTASSIUM CHLORIDE, AND CALCIUM CHLORIDE: .6; .31; .03; .02 INJECTION, SOLUTION INTRAVENOUS at 14:09

## 2025-08-18 RX ADMIN — ONDANSETRON 4 MG: 2 INJECTION INTRAMUSCULAR; INTRAVENOUS at 13:58

## 2025-08-18 ASSESSMENT — ACTIVITIES OF DAILY LIVING (ADL)
ADLS_ACUITY_SCORE: 16
ADLS_ACUITY_SCORE: 15
ADLS_ACUITY_SCORE: 16
ADLS_ACUITY_SCORE: 15
ADLS_ACUITY_SCORE: 16
ADLS_ACUITY_SCORE: 15
ADLS_ACUITY_SCORE: 15
ADLS_ACUITY_SCORE: 16

## 2025-08-18 ASSESSMENT — ENCOUNTER SYMPTOMS: SEIZURES: 0

## 2025-08-19 VITALS
BODY MASS INDEX: 32.16 KG/M2 | TEMPERATURE: 97 F | WEIGHT: 163.8 LBS | RESPIRATION RATE: 16 BRPM | SYSTOLIC BLOOD PRESSURE: 146 MMHG | DIASTOLIC BLOOD PRESSURE: 70 MMHG | OXYGEN SATURATION: 98 % | HEIGHT: 60 IN | HEART RATE: 69 BPM

## 2025-08-19 LAB
ATRIAL RATE - MUSE: 63 BPM
DIASTOLIC BLOOD PRESSURE - MUSE: NORMAL MMHG
GLUCOSE BLDC GLUCOMTR-MCNC: 103 MG/DL (ref 70–99)
INTERPRETATION ECG - MUSE: NORMAL
P AXIS - MUSE: 1 DEGREES
PR INTERVAL - MUSE: 138 MS
QRS DURATION - MUSE: 88 MS
QT - MUSE: 408 MS
QTC - MUSE: 417 MS
R AXIS - MUSE: -36 DEGREES
SYSTOLIC BLOOD PRESSURE - MUSE: NORMAL MMHG
T AXIS - MUSE: 19 DEGREES
VENTRICULAR RATE- MUSE: 63 BPM

## 2025-08-19 PROCEDURE — 82962 GLUCOSE BLOOD TEST: CPT

## 2025-08-19 PROCEDURE — 250N000013 HC RX MED GY IP 250 OP 250 PS 637

## 2025-08-19 RX ORDER — ACETAMINOPHEN 325 MG/1
975 TABLET ORAL EVERY 8 HOURS
Qty: 50 TABLET | Refills: 0 | Status: SHIPPED | OUTPATIENT
Start: 2025-08-19

## 2025-08-19 RX ORDER — POLYETHYLENE GLYCOL 3350 17 G/17G
17 POWDER, FOR SOLUTION ORAL DAILY
Qty: 510 G | Refills: 0 | Status: SHIPPED | OUTPATIENT
Start: 2025-08-19

## 2025-08-19 RX ADMIN — ACETAMINOPHEN 650 MG: 325 TABLET ORAL at 05:50

## 2025-08-19 ASSESSMENT — ACTIVITIES OF DAILY LIVING (ADL)
ADLS_ACUITY_SCORE: 16
ADLS_ACUITY_SCORE: 23
ADLS_ACUITY_SCORE: 16
ADLS_ACUITY_SCORE: 23
ADLS_ACUITY_SCORE: 16
ADLS_ACUITY_SCORE: 16

## 2025-08-21 LAB
PATH REPORT.COMMENTS IMP SPEC: NORMAL
PATH REPORT.COMMENTS IMP SPEC: NORMAL
PATH REPORT.FINAL DX SPEC: NORMAL
PATH REPORT.GROSS SPEC: NORMAL
PATH REPORT.MICROSCOPIC SPEC OTHER STN: NORMAL
PATH REPORT.RELEVANT HX SPEC: NORMAL
PHOTO IMAGE: NORMAL

## (undated) DEVICE — ESU LIGASURE DISSECTOR EXACT LF2019

## (undated) DEVICE — PREP CHLORAPREP 26ML TINTED HI-LITE ORANGE 930815

## (undated) DEVICE — TUBING SUCTION 20FT N620A

## (undated) DEVICE — TUBE ENDOTRACHEAL NIM TRIVANTAGE 6.0MM 8229706

## (undated) DEVICE — DRSG TEGADERM 2 3/8X2 3/4" 1624W

## (undated) DEVICE — SPONGE KITTNER 30-101

## (undated) DEVICE — CLIP HORIZON SM RED WIDE SLOT 001201

## (undated) DEVICE — BLADE KNIFE SURG 15 371115

## (undated) DEVICE — TEASPOON METAL STERILE 17506/24

## (undated) DEVICE — GLOVE PROTEXIS MICRO 6.5 LT BLUE 2D73PM65

## (undated) DEVICE — DRAIN RESERVOIR 100ML JP 0070740

## (undated) DEVICE — LINEN TOWEL PACK X5 5464

## (undated) DEVICE — ESU GROUND PAD ADULT W/CORD E7507

## (undated) DEVICE — SU SILK 2-0 TIE 12X30" A305H

## (undated) DEVICE — SU SILK 2-0 SH CR 8X18" C012D

## (undated) DEVICE — SU VICRYL+ 3-0 27IN SH UND VCP416H

## (undated) DEVICE — PREP PAD ALCOHOL 6818

## (undated) DEVICE — SU SILK 4-0 TIE 12X30" A303H

## (undated) DEVICE — Device

## (undated) DEVICE — LINEN TOWEL PACK X6 WHITE 5487

## (undated) DEVICE — TABLESPOON METAL STERILE 17508/24

## (undated) DEVICE — NIM PROBE NS STD INCR PRASS TIP STRL LF DISP 8225825X

## (undated) DEVICE — SURGICEL FIBRILLAR HEMOSTAT 4"X4" 1963

## (undated) DEVICE — SU DERMABOND ADVANCED .7ML DNX12

## (undated) DEVICE — RX VISTASEAL FIBRIN SEALANT W/THROMBIN 10ML VST10

## (undated) DEVICE — SU SILK 3-0 SH CR 8X18" C013D

## (undated) DEVICE — SU ETHILON 3-0 PS-1 18" 1663H

## (undated) DEVICE — SOL WATER IRRIG 1000ML BOTTLE 2F7114

## (undated) DEVICE — SU SILK 3-0 TIE 12X30" A304H

## (undated) DEVICE — CLIP HORIZON MED BLUE 002200

## (undated) DEVICE — SU MONOCRYL 5-0 P-3 18" UND Y493G

## (undated) DEVICE — CONNECTOR ERBEFLO 2 PORT 20325-215

## (undated) DEVICE — SPONGE RAY-TEC 4X8" 7318

## (undated) DEVICE — SUCTION MANIFOLD NEPTUNE 2 SYS 4 PORT 0702-020-000

## (undated) DEVICE — ESU ELEC BLADE 2.75" COATED/INSULATED E1455

## (undated) DEVICE — SOL NACL 0.9% IRRIG 1000ML BOTTLE 2F7124

## (undated) DEVICE — SU CHROMIC 3-0 SH 27" G122H

## (undated) RX ORDER — FENTANYL CITRATE 50 UG/ML
INJECTION, SOLUTION INTRAMUSCULAR; INTRAVENOUS
Status: DISPENSED
Start: 2025-08-18

## (undated) RX ORDER — PROPOFOL 10 MG/ML
INJECTION, EMULSION INTRAVENOUS
Status: DISPENSED
Start: 2025-08-18

## (undated) RX ORDER — DEXAMETHASONE SODIUM PHOSPHATE 4 MG/ML
INJECTION, SOLUTION INTRA-ARTICULAR; INTRALESIONAL; INTRAMUSCULAR; INTRAVENOUS; SOFT TISSUE
Status: DISPENSED
Start: 2025-08-18

## (undated) RX ORDER — ONDANSETRON 2 MG/ML
INJECTION INTRAMUSCULAR; INTRAVENOUS
Status: DISPENSED
Start: 2025-08-18

## (undated) RX ORDER — HYDROMORPHONE HYDROCHLORIDE 1 MG/ML
INJECTION, SOLUTION INTRAMUSCULAR; INTRAVENOUS; SUBCUTANEOUS
Status: DISPENSED
Start: 2025-08-18

## (undated) RX ORDER — PROPOFOL 10 MG/ML
INJECTION, EMULSION INTRAVENOUS
Status: DISPENSED
Start: 2023-09-29

## (undated) RX ORDER — ACETAMINOPHEN 500 MG
TABLET ORAL
Status: DISPENSED
Start: 2024-12-08